# Patient Record
Sex: MALE | Race: WHITE | NOT HISPANIC OR LATINO | Employment: OTHER | URBAN - METROPOLITAN AREA
[De-identification: names, ages, dates, MRNs, and addresses within clinical notes are randomized per-mention and may not be internally consistent; named-entity substitution may affect disease eponyms.]

---

## 2017-07-25 ENCOUNTER — HOSPITAL ENCOUNTER (OUTPATIENT)
Dept: FAMILY MEDICINE CLINIC | Facility: CLINIC | Age: 67
Setting detail: SPECIMEN
Discharge: HOME | End: 2017-07-25
Attending: INTERNAL MEDICINE | Admitting: INTERNAL MEDICINE

## 2017-07-25 LAB
ALBUMIN SERPL-MCNC: 4.3 G/DL (ref 3.5–4.8)
ALBUMIN/GLOB SERPL: 1.5 {RATIO} (ref 1–1.7)
ALP SERPL-CCNC: 57 IU/L (ref 32–91)
ALT SERPL-CCNC: 30 IU/L (ref 17–63)
ANION GAP SERPL CALC-SCNC: 14 MMOL/L (ref 10–20)
AST SERPL-CCNC: 36 IU/L (ref 15–41)
BASOPHILS # BLD AUTO: 0 10*3/UL (ref 0–0.2)
BASOPHILS NFR BLD AUTO: 1 % (ref 0–2)
BILIRUB SERPL-MCNC: 0.8 MG/DL (ref 0.3–1.2)
BUN SERPL-MCNC: 22 MG/DL (ref 8–20)
BUN/CREAT SERPL: 24.4 (ref 6.2–20.3)
CALCIUM SERPL-MCNC: 9.7 MG/DL (ref 8.9–10.3)
CHLORIDE SERPL-SCNC: 101 MMOL/L (ref 101–111)
CHOLEST SERPL-MCNC: 187 MG/DL
CHOLEST/HDLC SERPL: 3.8 {RATIO}
CONV CO2: 26 MMOL/L (ref 22–32)
CONV LDL CHOLESTEROL DIRECT: 126 MG/DL (ref 0–100)
CONV TOTAL PROTEIN: 7.2 G/DL (ref 6.1–7.9)
CREAT UR-MCNC: 0.9 MG/DL (ref 0.7–1.2)
DIFFERENTIAL METHOD BLD: (no result)
EOSINOPHIL # BLD AUTO: 0.3 10*3/UL (ref 0–0.3)
EOSINOPHIL # BLD AUTO: 5 % (ref 0–3)
ERYTHROCYTE [DISTWIDTH] IN BLOOD BY AUTOMATED COUNT: 13.5 % (ref 11.5–14.5)
GLOBULIN UR ELPH-MCNC: 2.9 G/DL (ref 2.5–3.8)
GLUCOSE SERPL-MCNC: 98 MG/DL (ref 65–99)
HCT VFR BLD AUTO: 44.6 % (ref 40–54)
HDLC SERPL-MCNC: 49 MG/DL
HGB BLD-MCNC: 14.9 G/DL (ref 14–18)
LDLC/HDLC SERPL: 2.6 {RATIO}
LIPID INTERPRETATION: ABNORMAL
LYMPHOCYTES # BLD AUTO: 1.8 10*3/UL (ref 0.8–4.8)
LYMPHOCYTES NFR BLD AUTO: 36 % (ref 18–42)
MCH RBC QN AUTO: 31.5 PG (ref 26–32)
MCHC RBC AUTO-ENTMCNC: 33.5 G/DL (ref 32–36)
MCV RBC AUTO: 94.1 FL (ref 80–94)
MONOCYTES # BLD AUTO: 0.5 10*3/UL (ref 0.1–1.3)
MONOCYTES NFR BLD AUTO: 9 % (ref 2–11)
NEUTROPHILS # BLD AUTO: 2.5 10*3/UL (ref 2.3–8.6)
NEUTROPHILS NFR BLD AUTO: 49 % (ref 50–75)
NRBC BLD AUTO-RTO: 0 /100{WBCS}
NRBC/RBC NFR BLD MANUAL: 0 10*3/UL
PLATELET # BLD AUTO: 161 10*3/UL (ref 150–450)
PMV BLD AUTO: 10.1 FL (ref 7.4–10.4)
POTASSIUM SERPL-SCNC: 4 MMOL/L (ref 3.6–5.1)
PSA SERPL-MCNC: 0.97 NG/ML (ref 0–4)
RBC # BLD AUTO: 4.74 10*6/UL (ref 4.6–6)
SODIUM SERPL-SCNC: 137 MMOL/L (ref 136–144)
TRIGL SERPL-MCNC: 55 MG/DL
VLDLC SERPL CALC-MCNC: 12 MG/DL
WBC # BLD AUTO: 5.1 10*3/UL (ref 4.5–11.5)

## 2018-08-09 ENCOUNTER — HOSPITAL ENCOUNTER (OUTPATIENT)
Dept: FAMILY MEDICINE CLINIC | Facility: CLINIC | Age: 68
Setting detail: SPECIMEN
Discharge: HOME | End: 2018-08-09
Attending: INTERNAL MEDICINE | Admitting: INTERNAL MEDICINE

## 2018-08-09 LAB
ALBUMIN SERPL-MCNC: 4.6 G/DL (ref 3.5–4.8)
ALBUMIN/GLOB SERPL: 1.6 {RATIO} (ref 1–1.7)
ALP SERPL-CCNC: 67 IU/L (ref 32–91)
ALT SERPL-CCNC: 28 IU/L (ref 17–63)
ANION GAP SERPL CALC-SCNC: 15.5 MMOL/L (ref 10–20)
AST SERPL-CCNC: 30 IU/L (ref 15–41)
BASOPHILS # BLD AUTO: 0 10*3/UL (ref 0–0.2)
BASOPHILS NFR BLD AUTO: 1 % (ref 0–2)
BILIRUB SERPL-MCNC: 1 MG/DL (ref 0.3–1.2)
BUN SERPL-MCNC: 25 MG/DL (ref 8–20)
BUN/CREAT SERPL: 27.8 (ref 6.2–20.3)
CALCIUM SERPL-MCNC: 10.1 MG/DL (ref 8.9–10.3)
CHLORIDE SERPL-SCNC: 101 MMOL/L (ref 101–111)
CHOLEST SERPL-MCNC: 201 MG/DL
CHOLEST/HDLC SERPL: 4.7 {RATIO}
CONV CO2: 26 MMOL/L (ref 22–32)
CONV LDL CHOLESTEROL DIRECT: 140 MG/DL (ref 0–100)
CONV TOTAL PROTEIN: 7.4 G/DL (ref 6.1–7.9)
CREAT UR-MCNC: 0.9 MG/DL (ref 0.7–1.2)
DIFFERENTIAL METHOD BLD: (no result)
EOSINOPHIL # BLD AUTO: 0.2 10*3/UL (ref 0–0.3)
EOSINOPHIL # BLD AUTO: 2 % (ref 0–3)
ERYTHROCYTE [DISTWIDTH] IN BLOOD BY AUTOMATED COUNT: 13.2 % (ref 11.5–14.5)
GLOBULIN UR ELPH-MCNC: 2.8 G/DL (ref 2.5–3.8)
GLUCOSE SERPL-MCNC: 85 MG/DL (ref 65–99)
HCT VFR BLD AUTO: 44.4 % (ref 40–54)
HDLC SERPL-MCNC: 43 MG/DL
HGB BLD-MCNC: 15.1 G/DL (ref 14–18)
LDLC/HDLC SERPL: 3.3 {RATIO}
LIPID INTERPRETATION: ABNORMAL
LYMPHOCYTES # BLD AUTO: 2 10*3/UL (ref 0.8–4.8)
LYMPHOCYTES NFR BLD AUTO: 29 % (ref 18–42)
MCH RBC QN AUTO: 31.9 PG (ref 26–32)
MCHC RBC AUTO-ENTMCNC: 34.1 G/DL (ref 32–36)
MCV RBC AUTO: 93.5 FL (ref 80–94)
MONOCYTES # BLD AUTO: 0.5 10*3/UL (ref 0.1–1.3)
MONOCYTES NFR BLD AUTO: 8 % (ref 2–11)
NEUTROPHILS # BLD AUTO: 4.2 10*3/UL (ref 2.3–8.6)
NEUTROPHILS NFR BLD AUTO: 60 % (ref 50–75)
NRBC BLD AUTO-RTO: 0 /100{WBCS}
NRBC/RBC NFR BLD MANUAL: 0 10*3/UL
PLATELET # BLD AUTO: 183 10*3/UL (ref 150–450)
PMV BLD AUTO: 10 FL (ref 7.4–10.4)
POTASSIUM SERPL-SCNC: 3.5 MMOL/L (ref 3.6–5.1)
RBC # BLD AUTO: 4.75 10*6/UL (ref 4.6–6)
SODIUM SERPL-SCNC: 139 MMOL/L (ref 136–144)
TRIGL SERPL-MCNC: 110 MG/DL
VLDLC SERPL CALC-MCNC: 17.9 MG/DL
WBC # BLD AUTO: 6.9 10*3/UL (ref 4.5–11.5)

## 2018-08-10 LAB
BILIRUB UR QL STRIP: NEGATIVE MG/DL
CASTS URNS QL MICRO: NORMAL /[LPF]
COLOR UR: YELLOW
CONV BACTERIA IN URINE MICRO: NEGATIVE
CONV CLARITY OF URINE: CLEAR
CONV HYALINE CASTS IN URINE MICRO: 0 /[LPF] (ref 0–5)
CONV PROTEIN IN URINE BY AUTOMATED TEST STRIP: NEGATIVE MG/DL
CONV SMALL ROUND CELLS: NORMAL /[HPF]
CONV UROBILINOGEN IN URINE BY AUTOMATED TEST STRIP: 1 MG/DL
CULTURE INDICATED?: NORMAL
GLUCOSE UR QL: NEGATIVE MG/DL
HGB UR QL STRIP: NEGATIVE
KETONES UR QL STRIP: NEGATIVE MG/DL
LEUKOCYTE ESTERASE UR QL STRIP: NEGATIVE
NITRITE UR QL STRIP: NEGATIVE
PH UR STRIP.AUTO: 6.5 [PH] (ref 4.5–8)
RBC #/AREA URNS HPF: 0 /[HPF] (ref 0–3)
SP GR UR: 1.03 (ref 1–1.03)
SPERM URNS QL MICRO: NORMAL /[HPF]
SQUAMOUS SPT QL MICRO: 0 /[HPF] (ref 0–5)
UNIDENT CRYS URNS QL MICRO: NORMAL /[HPF]
WBC #/AREA URNS HPF: 1 /[HPF] (ref 0–5)
YEAST SPEC QL WET PREP: NORMAL /[HPF]

## 2019-02-13 ENCOUNTER — HOSPITAL ENCOUNTER (OUTPATIENT)
Dept: FAMILY MEDICINE CLINIC | Facility: CLINIC | Age: 69
Setting detail: SPECIMEN
Discharge: HOME | End: 2019-02-13
Attending: INTERNAL MEDICINE | Admitting: INTERNAL MEDICINE

## 2019-02-13 LAB
ALBUMIN SERPL-MCNC: 4.1 G/DL (ref 3.5–4.8)
ALBUMIN/GLOB SERPL: 1.5 {RATIO} (ref 1–1.7)
ALP SERPL-CCNC: 80 IU/L (ref 32–91)
ALT SERPL-CCNC: 31 IU/L (ref 17–63)
ANION GAP SERPL CALC-SCNC: 12.8 MMOL/L (ref 10–20)
AST SERPL-CCNC: 33 IU/L (ref 15–41)
BILIRUB SERPL-MCNC: 0.1 MG/DL (ref 0.3–1.2)
BUN SERPL-MCNC: 22 MG/DL (ref 8–20)
BUN/CREAT SERPL: 24.4 (ref 6.2–20.3)
CALCIUM SERPL-MCNC: 9.3 MG/DL (ref 8.9–10.3)
CHLORIDE SERPL-SCNC: 97 MMOL/L (ref 101–111)
CONV CO2: 26 MMOL/L (ref 22–32)
CONV TOTAL PROTEIN: 6.9 G/DL (ref 6.1–7.9)
CREAT UR-MCNC: 0.9 MG/DL (ref 0.7–1.2)
GLOBULIN UR ELPH-MCNC: 2.8 G/DL (ref 2.5–3.8)
GLUCOSE SERPL-MCNC: 98 MG/DL (ref 65–99)
POTASSIUM SERPL-SCNC: 3.8 MMOL/L (ref 3.6–5.1)
SODIUM SERPL-SCNC: 132 MMOL/L (ref 136–144)

## 2019-05-22 ENCOUNTER — CONVERSION ENCOUNTER (OUTPATIENT)
Dept: FAMILY MEDICINE CLINIC | Facility: CLINIC | Age: 69
End: 2019-05-22

## 2019-05-29 ENCOUNTER — HOSPITAL ENCOUNTER (OUTPATIENT)
Dept: MRI IMAGING | Facility: HOSPITAL | Age: 69
Discharge: HOME | End: 2019-05-29
Attending: INTERNAL MEDICINE | Admitting: INTERNAL MEDICINE

## 2019-06-04 VITALS
WEIGHT: 209.5 LBS | RESPIRATION RATE: 16 BRPM | BODY MASS INDEX: 29.22 KG/M2 | OXYGEN SATURATION: 96 % | DIASTOLIC BLOOD PRESSURE: 80 MMHG | HEART RATE: 58 BPM | SYSTOLIC BLOOD PRESSURE: 152 MMHG

## 2019-06-06 NOTE — PROGRESS NOTES
Vital Signs:    Patient Profile:    69 Years Old Male  Height:     71 inches  Weight:     209.50 pounds  BMI:        29.22     O2 Sat:     96 %  Temp:       98.7 degrees F oral  Pulse rate: 58 / minute  Resp:       16 per minute  BP Sittin / 80  (left arm)        Problems: Active problems were reviewed with the patient during this visit.  Medications: Medications were reviewed with the patient during this visit.  Allergies: Allergies were reviewed with the patient during this visit.  No Known Allergy.        Vitals Entered By: Paty CONRAD  (May 22, 2019 4:11 PM)      Referring Provider:  Mei Kimball MD  Primary Provider:  Mei Kimball MD    CC:  back pain.    History of Present Illness:  Pt here for increased lower back pain- had been doing well with the celebrex- after cutting grass in riding lawn more than 4 hours- thinks the vibrations did it- last night started wiht left leg spasms and excruciating pain- can't stand long enough to make   breakfast,       Past Medical History:     Reviewed history from 2018 and no changes required:        OA        Clavicular fracture        Hypertension                sees Ophthalmology     Past Surgical History:     Reviewed history from 2015 and no changes required:        Arthroscopy, right knee ()        Foot surgery    Family History Summary:      Reviewed history Last on 2019 and no changes required:2019  Mother - Has Family History of Other Medical Problems - mom dementia in 80's - Entered On: 2016  Father - Has Family History of Other Medical Problems -  of dementia - Entered On: 2016  Father - Has Family History of Other Cancer - throat cancer- dad - Entered On: 2016    General Comments - FH:  OA- mom with knee replacement    Social History:     Reviewed history from 2018 and no changes required:        Patient has never smoked.        Passive Smoke: N        Alcohol Use: N        Drug Use:  N        HIV/High Risk: N        Regular Exercise: Y        Hx Domestic Abuse: N        Druze Affecting Care: N        Marital Status:         Children:         Occupation:         Risk Factors:     Smoked Tobacco Use:  Never smoker  Smokeless Tobacco Use:  Never        Physical Exam   Weight:  215  BP:  152/80 mm HG    Medication List:  HYDROCODONE-ACETAMINOPHEN 7.5-325 MG ORAL TABLET (HYDROCODONE-ACETAMINOPHEN) 1 every 8 hours as needed  VITAMIN E TABLET (VITAMIN E TABS) take 1 tablet once daily  FISH OIL CAPSULE (OMEGA-3 FATTY ACIDS CAPS) take 1 capsule once daily  LOSARTAN POTASSIUM-HCTZ 50-12.5 MG ORAL TABLET (LOSARTAN POTASSIUM-HCTZ) 1 po daily  LOSARTAN/HCTZ 50/12.5MG TABLETS (LOSARTAN POTASSIUM-HCTZ) TAKE 1 TABLET BY MOUTH EVERY DAY  LOSARTAN/HCTZ 50/12.5MG TABLETS (LOSARTAN POTASSIUM-HCTZ) TAKE 1 TABLET BY MOUTH EVERY DAY  CELECOXIB 200MG CAPSULES (CELECOXIB) TAKE 1 CAPSULE BY MOUTH TWICE DAILY      Surgical History   Arthroscopy, right knee (2013)  Foot surgery,    Risk Factors  Tobacco Use: Never smoker  Passive smoke exposure: no  Exercise: yes  Type of Exercise: golf  Illicit Drug use: no  Taking Calcium Supplement: no      Orientation     Language   Alert 1  Total MMSE Score: 30    Physical Examination   General Appearance   In no acute distress.  Alert & oriented.  Behavior and affect appropriate to situation  Skin   No suspicious lesions, moles or rashes . Turgor good  HEENT   PERRLA, EOMI, TM's normal.  Pharynx clear  Neck   Supple.  No adenopathy  Cardiovascular   Regular rate and rhythm  Lungs   Clear to auscultation  Abdomen   Soft, non-tender.  Bowel sounds present.  No hepatosplenomegaly  Back    left sciaitica very tender- midline diffuse tenderness  Musculoskeletal   left trocanteric very tender  Neurologic    paresthesia  Psych   Alert and cooperative; normal mood and affect; normal attention span and concentration        Impression & Recommendations:    Problem # 1:  SPINAL  STENOSIS, LUMBAR (ICD-724.02) (VVK28-M96.06)    Problem # 2:  BURSITIS (ICD-727.3) (VNQ80-I76.9)    Orders:  Depo-Medrol 80 mg ()  Injection into Large Joint (CPT-96809)      Medications Added to Medication List This Visit:  1)  Hydrocodone-acetaminophen 7.5-325 Mg Oral Tablet (Hydrocodone-acetaminophen) .... 1 every 8 hours as needed      Patient Instructions:  1)  During this office visit we discussed the pertinent aspects of the visit and the treatment recommendations.  Patient was given the opportunity to ask questions and discuss other concerns.  2)   tolerated injection well- will get MRI of lumbar as soon as can walk better                Medication Administration    Orders Added:  1)  Ofc Vst, Est Level III [75954]  2)  Depo-Medrol 80 mg []  3)  Injection into Large Joint [CPT-08246]                Procedure Note    Injections:  Indication: acute pain    Procedure # 1: joint injection     Region: lateral     Location: left hip     Technique: 20 g 1-1/2' needle     Medication: 80 mg depomedrol     Anesthesia: 1.0 ml 0.5% marcaine     Comment: [place] trocanteric injection without problems          Electronically signed by Mei Kimball MD on 05/23/2019 at 8:05 AM  ________________________________________________________________________       Disclaimer: Converted Note message may not contain all data elements that existed in the legacy source system. Please see [employer] [facility] for the original note details.

## 2019-06-12 ENCOUNTER — TRANSCRIBE ORDERS (OUTPATIENT)
Dept: ORTHOPEDIC SURGERY | Facility: CLINIC | Age: 69
End: 2019-06-12

## 2019-06-12 ENCOUNTER — HOSPITAL ENCOUNTER (OUTPATIENT)
Dept: ORTHOPEDIC SURGERY | Facility: CLINIC | Age: 69
Discharge: HOME | End: 2019-06-12
Attending: ORTHOPAEDIC SURGERY | Admitting: ORTHOPAEDIC SURGERY

## 2019-06-12 ENCOUNTER — CONVERSION ENCOUNTER (OUTPATIENT)
Dept: ORTHOPEDIC SURGERY | Facility: CLINIC | Age: 69
End: 2019-06-12

## 2019-06-12 VITALS
SYSTOLIC BLOOD PRESSURE: 130 MMHG | DIASTOLIC BLOOD PRESSURE: 77 MMHG | HEIGHT: 71 IN | HEART RATE: 62 BPM | BODY MASS INDEX: 29.68 KG/M2 | WEIGHT: 212 LBS

## 2019-06-12 DIAGNOSIS — M54.5 LOW BACK PAIN, UNSPECIFIED BACK PAIN LATERALITY, UNSPECIFIED CHRONICITY, WITH SCIATICA PRESENCE UNSPECIFIED: Primary | ICD-10-CM

## 2019-06-13 NOTE — PROGRESS NOTES
Visit Type:  Consult  Referring Provider:  Mei Kimball MD  Primary Care Provider:  Rehana    Chief Complaint:  back, hip and left leg pain.    History of Present Illness:         This is a right-handed patient who presents with back, hip, left leg pain.  The symptoms began 2 months ago.  The intensity is described as 8.           He complains of low back pain, hip pain, numbness and weakness.  Pain is described as sharp, burning and radiating.  The pain radiates to both hips, left leg/foot and right leg/foot.  Symptoms are made worse with extension, activity and standing.  The   pain is improved with flexion, sitting and lying down.  Critical exclusionary symptoms include age >50 yrs with new back pain.  Evaluation and treatment to date includes PT, chiropractor and pain management clinic.        Past Medical History:     Reviewed history from 2018 and no changes required:        OA        Clavicular fracture        Hypertension                sees Ophthalmology     Past Surgical History:     Reviewed history from 2015 and no changes required:        Arthroscopy, right knee ( and )        Foot surgery, tumor removed         Resection distal clavical     Family History Summary:      Reviewed history Last on 2019 and no changes required:2019  Mother - Has Family History of Other Medical Problems - mom dementia in 80's - Entered On: 2016  Father - Has Family History of Other Medical Problems -  of dementia - Entered On: 2016  Father - Has Family History of Other Cancer - throat cancer- dad - Entered On: 2016    General Comments - FH:  OA- mom with knee replacement    Social History:     Reviewed history from 2018 and no changes required:        Patient has never smoked.        Passive Smoke: N        Alcohol Use: N        Drug Use: N        HIV/High Risk: N        Regular Exercise: Y        Hx Domestic Abuse: N        Hinduism Affecting Care: N         Marital Status:         Children:         Occupation:         Risk Factors:     Smoked Tobacco Use:  Never smoker  Smokeless Tobacco Use:  Never  Passive smoke exposure:  no  Drug use:  no  HIV high-risk behavior:  no  Alcohol use:  yes     Type:  beer     Drinks per day:  1  Exercise:  yes     Type of Exercise:  golf  Seatbelt use:  100 %  Sun Exposure:  occasionally      Vital Signs:    Patient Profile:    69 Years Old Male  Height:     71 inches  Weight:     212 pounds  BMI:        29.56     Pulse rate: 62 / minute  BP Sittin / 77  (left arm)    Cuff size:  large       Type:        normal    Problems: Active problems were reviewed with the patient during this visit.  Medications: Medications were reviewed with the patient during this visit.  Allergies: Allergies were reviewed with the patient during this visit.  No Known Allergy.        Vitals Entered By: Yanci Davila CMA (2019 2:03 PM)    Active Medications (reviewed today):  NORCO  MG ORAL TABLET (HYDROCODONE-ACETAMINOPHEN) take 1 every 8 hours as needed for pain  VITAMIN E TABLET (VITAMIN E TABS) take 1 tablet once daily  FISH OIL CAPSULE (OMEGA-3 FATTY ACIDS CAPS) take 1 capsule once daily  LOSARTAN POTASSIUM-HCTZ 50-12.5 MG ORAL TABLET (LOSARTAN POTASSIUM-HCTZ) 1 po daily  LOSARTAN/HCTZ 50/12.5MG TABLETS (LOSARTAN POTASSIUM-HCTZ) TAKE 1 TABLET BY MOUTH EVERY DAY  LOSARTAN/HCTZ 50/12.5MG TABLETS (LOSARTAN POTASSIUM-HCTZ) TAKE 1 TABLET BY MOUTH EVERY DAY  CELECOXIB 200MG CAPSULES (CELECOXIB) TAKE 1 CAPSULE BY MOUTH TWICE DAILY    Current Allergies (reviewed today):  No known allergies    Review of Systems   Neurologic: Denies Seizure.   General: Denies anorexia.   Eyes: Denies irritation.   Ears/Nose/Throat: Denies ear discharge.   Cardiovascular: Denies PND.   Respiratory: Denies chronic cough.   Gastrointestinal: Denies odynophagia.   Musculoskeletal: Complains of back pain, muscle cramps, muscle weakness, stiffness.   Skin:  Denies rash.   Psychiatric: Denies hallucinations.   Endocrine: Denies polydipsia.   ROS Comments: Axial lower back pain bilateral buttock and lower extremity pain difficulty walking hip and knee osteoarthritis      General   General appearance: Well-developed, well-nourished, in no acute distress; alert and oriented x 3.    Build and nutrition: normal  Posture: normal    Head   Shape: normal  Face: normal  Scalp: normal    Cardiovascular   Auscultation: S1, S2, no murmur, rub, or gallop  Carotid arteries: pulses 2+, symmetric, no bruits  Peripheral vascular: no cyanosis, clubbing, edema, or varicosities    Lymphatic   Lymph nodes: normal      Lumbosacral Exam:    Inspection-deformity:       Yes      de Yolanda scoliosis  Palpation-spinal tenderness:    Yes  Range of Motion     Forward Flexion: 25 degrees     Hyperextension:  10 degrees     Schober's:           >6 cm     Right Lateral Bend:  10 degrees     Left Lateral Bend:   10 degrees  Motor Exam/Strength:     Right:        Ankle Dorsiflexion (L5,L4):       normal        Great Toe Dorsiflexion (L5,L4):   normal        Heel Walk (L5,some L4):       normal        Single Squat & Rise Quads (L4):   normal        Toe Walk-calf (S1):           abnormal     Left:        Ankle Dorsiflexion (L5,L4):       normal        Great Toe Dorsiflexion (L5,L4):   normal        Heel Walk (L5,some L4):       normal        Single Squat & Rise-Quads (L4):   normal        Toe Walk-calf (S1):           normal      right ankle plantar flexor is 4/5, atrophy of right calf  Sensory Exam/Pinprick:     Right:        Medial Foot (L4): normal        Dorsal Foot (L5): normal        Lateral Foot (S1):    normal     Left:        Medial Foot (L4): normal        Dorsal Foot (L5): normal        Lateral Foot (S1):    normal  Reflexes:     Right:        Knee Jerk (L4):   normal        Ankle (S1):       decreased     Left:        Knee Jerk (L4):   normal        Ankle (S1):       normal  Squatting:   normal  Straight Leg Raise (SLR):     Right:  positive     Left:  positive  Sitting Knee Extension (SKE):     Right:  positive     Left:  positive  Reverse Straight Leg Raise:     Right:  negative     Left:  negative  Toe-Heel Walking:     Right:  negative     Left:  negative  Spurling Maneuver:     Right:  negative     Left:  negative  James's Maneuver:     Right:  negative     Left:  negative  Castro's Sign:     Right:  negative     Left:  negative  Fabere Test:     Right:  negative     Left:  negative  Heel Percussion:     Right:  negative     Left:  negative    Cervical Exam:    Inspection-deformity:       No  Palpation-spinal tenderness:    No  Range of Motion     Forward Flexion: 60 degrees     Hyperextension:  75 degrees     Right Lat. Flexion:  45 degrees     Right Lat. Rotation: 80 degrees     Left Lat. Flexion:   45 degrees     Left Lat. Rotation:  80 degrees  Motor Exam/Strength:     Right:        Shoulder Abduct. (C5,C6): normal        Biceps (C5,C6):       normal        Triceps (C6,C7):      normal        Handgrip (C7,C8,T1):  normal        Finger Spread (C8,T1):    normal     Left:        Shoulder Abduct. (C5,C6): normal        Biceps (C5,C6):       normal        Triceps (C6,C7):      normal        Handgrip (C7,C8,T1):  normal        Finger Spread (C8,T1):    normal  Sensory Exam/Pinprick:     Right:        Posterior Scalp (C2):     normal        Posterior Neck (C3):      normal        Upper Shoulder (C4):      normal        Upper Lateral Arm (C5):   normal        Thumb (C6):       normal        3rd Finger (C7):      normal        5th Finger (C8):      normal        Medial Forearm (T1):  normal     Left:        Posterior Scalp (C2):     normal        Posterior Neck (C3):      normal        Upper Shoulder (C4):      normal        Upper Lateral Arm (C5):   normal        Thumb (C6):       normal        3rd Finger (C7):      normal        5th Finger (C8):      normal        Medial Forearm (T1):   normal  Reflexes:     Right:        Biceps (C5,C6):       normal        Triceps (C6,C7):      normal        Brachioradialis (C5,C6):  normal     Left:        Biceps (C5,C6):       normal        Triceps (C6,C7):      normal        Brachioradialis (C5,C6):  normal    Thoracic Exam:    Inspection-deformity:       No  Palpation-spinal tenderness:    No      Hip Exam    General:     Well-developed, well-nourished, in no acute distress; alert and oriented x 3.      Gait:     Normal heel-toe gait pattern bilaterally.      Skin:     Intact with no erythema; no scarring.      Inspection:     plantigrade foot with normal alignment of leg, ankle, hindfoot, and foot.     Palpation:     non-tender to palpation over distal leg, medial and lateral ankle, distal achilles tendon, medial and lateral hindfoot, posterior heel, plantar heel, midfoot and forefoot bilaterally.     Vascular:     dorsalis pedis and posterior tibial pulses 2+ and symmetric, capillary refill < 2 seconds, normal hair pattern, no evidence of ischemia.     Sensory:     sensation intact to light touch bilaterally in lower extremities.      Motor:     Motor strength 5/5 bilaterally for quadriceps, hamstrings, ankle dorsiflexion, ankle plantar flexion, hindfoot eversion, hindfoot inversion, EHL (extensor hallicus longus), and FHL (flexor hallicus longus).      Reflexes:     Normal and symmetric patellar and Achilles reflexes bilaterally.      Hip Exam:     Right:     Inspection:  Normal     Palpation:  Normal     Stability:  stable     Tenderness:  no     Swelling:  no     Erythema:  no     Range of Motion:        Flexion-Active: 60        Extension-Active: 45        Internal Rotation-Active: 45        External Rotation-Active: 75        Flexion-Passive: 60        Extension-Passive: 45        Internal Rotation-Passive: 45        External Rotation: 75     Left:     Inspection:  Normal     Palpation:  Normal     Stability:  stable     Tenderness:  no     Swelling:   no     Erythema:  no     Range of Motion:        Flexion-Active: 60        Extension-Active: 45        Internal Rotation-Active: 15        External Rotation-Active: 75        Flexion-Passive: 60        Extension-Passive: 45        Internal Rotation-Passive: 20        External Rotation: 75    Tinel's of Foot:     Negative Tinel’s over posterior tibial, superficial peroneal, and sural nerves.          Neurologic   DTR Right: normal except achilles 1/2  DTR Left: 2+ symmetric  Cerebellar function: normal  Meningeal signs: none  L'Hermitte's negative  Spurling's: negative  Gaenslen's: negative    Sensory   Light touch: normal  Pain: normal  Vibration: normal  Stereognosis: normal  Number identification: normal  Proprioception: normal  Paresthesias: none    Cranial Nerves  5th (Trigeminal): normal corneal reflex  7th (Facial): no weakness  8th (Auditory): normal hearing with whispered voice or finger rub  9th (Glossophar): gag reflex normal  11th (Spinal access): no atrophy, normal strength bilaterally  12th (Hypoglossal): tongue midline on protrusion, no fasciculations    Mental Status Exam   Affect: normal  Orientation: oriented to time, place, and person  Attention span/Concentration/Cognitive function: all normal  Memory: normal  Speech/language: normal  Fund of knowledge: able to name months, seasons, current president  Thought content: normal      LS Spine/AP/Lateral    Procedure date:  06/12/2019    Findings:        x-ray of lumbar spine is demonstrating care de Yolanda scoliosis with convexity to the right side Chun measurement of T12-L4 about 25°, lumbar lordosis of 4° pelvic tilt of 20° and pelvic incidence of 60°        Impression & Recommendations:    Problem # 1:  Degenerative scoliosis (ICD-737.39) (CZV77-D37.80)  Assessment: Deteriorated   impression: this patient is a very pleasant  69 years old gentleman who referred to me by Dr. Kimball for evaluation of his axial back pain, intractable pain in his buttock  and lower extremities  difficulty walking the patient has had episode of back pain   for many years but recently his pain aggravated the patient has weakness of right calf muscles and difficulty with his right ankle plantar flexor, his ankle plantar flexor are 4/5 and he is not able to do toe walking in the right leg.  The patient x-rays   demonstrating care de Yolanda scoliosis with mild that translation of L3 over L4 scoliosis with convexity to the left side Chun measurement of T12-L4 about 25°, L1-L5 lumbar lordosis of 40°, pelvic tilt of 20° and pelvic incidence of 60°.  MRI is   demonstrating de Yolanda scoliosis spinal and foraminal stenosis from L2-S1 profoundly at the level of L3-L4.  The patient tried physical therapy pain management without any benefit the patient would like to proceed with surgical intervention and I had the    opportunity to discuss with team and his wife the option of surgical decompression and implantation of Co flex versus surgical Depo compression and a spinal fusion surgery which is going to be T10-S2 fusion, the patient does not like to proceed with fusion   surgery am going to obtain CT scan of lumbar spine to look at his bone density look at his bony structure to be able to make a decision for this patient.    Medications Added to Medication List This Visit:  1)  Aspir-low 81 Mg Oral Tablet Delayed Release (Aspirin)  2)  Daily Multivitamin Oral Capsule (Multiple vitamins-minerals)    Other Orders:  CT LUMBAR SPINE WO (STANDARD) (CPT-10093)  VITAMIN D TOTAL (VITD)  LS Spine 2 view A,P Lat-TC only (80681-ZM)  Ofc Vst, New Level IV (99746)      Patient Instructions:  1)  Please schedule a follow-up appointment after CT.  2)  Please schedule a follow-up appointment in 4 weeks.  3)  Discussed importance of regular exercise and recommended starting or continuing a regular exercise program for good health.    ]      Electronically signed by Isabel Mckinney MD on 06/12/2019 at 5:21  PM  ________________________________________________________________________       Disclaimer: Converted Note message may not contain all data elements that existed in the legacy source system. Please see [facility] for the original note details.

## 2019-06-24 ENCOUNTER — LAB (OUTPATIENT)
Dept: LAB | Facility: HOSPITAL | Age: 69
End: 2019-06-24

## 2019-06-24 ENCOUNTER — TRANSCRIBE ORDERS (OUTPATIENT)
Dept: ADMINISTRATIVE | Facility: HOSPITAL | Age: 69
End: 2019-06-24

## 2019-06-24 ENCOUNTER — HOSPITAL ENCOUNTER (OUTPATIENT)
Dept: CT IMAGING | Facility: HOSPITAL | Age: 69
Discharge: HOME | End: 2019-06-24
Admitting: ORTHOPAEDIC SURGERY

## 2019-06-24 DIAGNOSIS — M54.5 LOW BACK PAIN, UNSPECIFIED BACK PAIN LATERALITY, UNSPECIFIED CHRONICITY, WITH SCIATICA PRESENCE UNSPECIFIED: ICD-10-CM

## 2019-06-24 DIAGNOSIS — E55.9 VITAMIN D DEFICIENCY, UNSPECIFIED: ICD-10-CM

## 2019-06-24 DIAGNOSIS — E55.9 VITAMIN D DEFICIENCY, UNSPECIFIED: Primary | ICD-10-CM

## 2019-06-24 PROCEDURE — 82306 VITAMIN D 25 HYDROXY: CPT

## 2019-06-24 PROCEDURE — 36415 COLL VENOUS BLD VENIPUNCTURE: CPT

## 2019-06-24 PROCEDURE — 72131 CT LUMBAR SPINE W/O DYE: CPT

## 2019-06-25 LAB — 25(OH)D3 SERPL-MCNC: 34.5 NG/ML (ref 30–100)

## 2019-07-10 ENCOUNTER — TELEPHONE (OUTPATIENT)
Dept: FAMILY MEDICINE CLINIC | Facility: CLINIC | Age: 69
End: 2019-07-10

## 2019-07-10 ENCOUNTER — OFFICE VISIT (OUTPATIENT)
Dept: ORTHOPEDIC SURGERY | Facility: CLINIC | Age: 69
End: 2019-07-10

## 2019-07-10 VITALS
SYSTOLIC BLOOD PRESSURE: 151 MMHG | HEART RATE: 58 BPM | DIASTOLIC BLOOD PRESSURE: 73 MMHG | HEIGHT: 71 IN | BODY MASS INDEX: 30.49 KG/M2 | WEIGHT: 217.8 LBS

## 2019-07-10 DIAGNOSIS — M48.062 SPINAL STENOSIS OF LUMBAR REGION WITH NEUROGENIC CLAUDICATION: Primary | ICD-10-CM

## 2019-07-10 PROCEDURE — 99213 OFFICE O/P EST LOW 20 MIN: CPT | Performed by: ORTHOPAEDIC SURGERY

## 2019-07-10 RX ORDER — ASPIRIN 81 MG/1
81 TABLET ORAL DAILY
COMMUNITY

## 2019-07-10 RX ORDER — CELECOXIB 200 MG/1
200 CAPSULE ORAL 2 TIMES DAILY
COMMUNITY
Start: 2019-05-10 | End: 2019-11-26 | Stop reason: SDUPTHER

## 2019-07-10 RX ORDER — MULTIPLE VITAMINS W/ MINERALS TAB 9MG-400MCG
1 TAB ORAL DAILY
COMMUNITY

## 2019-07-10 RX ORDER — LOSARTAN POTASSIUM AND HYDROCHLOROTHIAZIDE 12.5; 5 MG/1; MG/1
1 TABLET ORAL EVERY MORNING
COMMUNITY
Start: 2019-05-10 | End: 2019-11-26

## 2019-07-10 RX ORDER — ASCORBATE CALCIUM 500 MG
TABLET ORAL
COMMUNITY
Start: 2018-08-09 | End: 2019-07-17

## 2019-07-10 NOTE — PROGRESS NOTES
Visit Type: Follow Up  Referring Provider: No ref. provider found  Primary Care Provider: Mei Kimball MD    Patient Name: Daren Zambrano  Patient Age: 69 y.o.  Chief Complaint: had concerns including Follow-up and Pain of the Lumbar Spine (F/u CT Lspine).    Subjective   History of Present Illness: Axial lower back pain bilateral buttock and lower extremity pain and difficulty walking left worse than right, the patient was diagnosed with degenerative scoliosis profound spinal and foraminal stenosis the patient tried physical therapy and pain management without significant benefit, the patient is here today for evaluation of his lumbar spine CT scan.      Review of Systems   Constitutional: Positive for activity change.   Musculoskeletal: Positive for back pain and myalgias.   Neurological: Positive for numbness.       The following portions of the patient's history were reviewed and updated as appropriate: allergies, current medications, past family history, past medical history, past social history, past surgical history and problem list.    Past Medical History:   Diagnosis Date   • Clavicle fracture    • Hypertension    • Osteoarthritis        Past Surgical History:   Procedure Laterality Date   • FOOT SURGERY      TUMOR REMOVED   • KNEE ARTHROSCOPY Right      AND    • RESECTION DISTAL CLAVICLE         Vitals:    07/10/19 0825   BP: 151/73   Pulse: 58       There is no problem list on file for this patient.      Family History Summary:  family history includes Cancer in his father; Dementia in his mother; Other in his mother.    Social History     Socioeconomic History   • Marital status:      Spouse name: Not on file   • Number of children: Not on file   • Years of education: Not on file   • Highest education level: Not on file   Tobacco Use   • Smoking status: Former Smoker     Types: Cigarettes     Last attempt to quit: 7/10/1977     Years since quittin.0   Substance and  Sexual Activity   • Alcohol use: No     Frequency: Never   • Drug use: No   • Sexual activity: Defer         Current Outpatient Medications:   •  aspirin 81 MG EC tablet, Take 81 mg by mouth Daily., Disp: , Rfl:   •  celecoxib (CeleBREX) 200 MG capsule, , Disp: , Rfl:   •  losartan-hydrochlorothiazide (HYZAAR) 50-12.5 MG per tablet, , Disp: , Rfl:   •  Multiple Vitamins-Minerals (MULTIVITAMIN WITH MINERALS) tablet tablet, Take 1 tablet by mouth Daily., Disp: , Rfl:   •  Omega-3 Fatty Acids (FISH OIL PO), FISH OIL CAPS, Disp: , Rfl:   •  Vitamin E 100 units tablet, VITAMIN E TABS, Disp: , Rfl:     No Known Allergies        Objective   Physical Exam  Back Exam     Tenderness   The patient is experiencing tenderness in the lumbar.    Range of Motion   Extension:  40 abnormal   Flexion:  40 abnormal   Lateral bend right:  20 abnormal   Lateral bend left:  20 abnormal   Rotation right: 10   Rotation left: 10     Muscle Strength   The patient has normal back strength.    Tests   Straight leg raise right: negative  Straight leg raise left: negative    Reflexes   Patellar: normal  Achilles: normal  Biceps: normal  Babinski's sign: normal     Other   Toe walk: abnormal  Heel walk: abnormal  Sensation: decreased  Gait: normal               Impression and Plan: This patient is here today for follow-up and evaluation of his lumbar spine CT scan the patient has profound stenosis of L3-S1 facet arthropathy and degenerative scoliosis, I have measured his Hounsfield unit which is at the range of 144 lower lumbar spine segments.  I had a long discussion with this patient regarding simple decompression versus decompression and fusion surgery the patient does not like to proceed with reconstruction and fusion surgery and I am planning to proceed with partial laminectomy posterior decompression of L3-L5 with implantation of Coflex, partial laminectomy bilateral foraminotomy and left L5-S1 microdiscectomy.  The risk and benefits of  procedure as like as risk of bleeding, infection, DVT risk of dural tear and CSF leak risk of failure of implantation risk of future reconstructive surgery explained to him and his wife in detail.    No primary diagnosis found.     No orders of the defined types were placed in this encounter.              Isabel Mckinney MD  07/10/19  9:10 AM

## 2019-07-10 NOTE — TELEPHONE ENCOUNTER
Patient left  stating that he is scheduled for surgery on 7/29 with Dr. Mckinney. Patient states that they are supposed to be faxing letter for you to sign for clearance. Patient is asking if he needs to be seen or can papers just be signed.

## 2019-07-11 ENCOUNTER — PREP FOR SURGERY (OUTPATIENT)
Dept: OTHER | Facility: HOSPITAL | Age: 69
End: 2019-07-11

## 2019-07-11 DIAGNOSIS — M48.00 SPINAL STENOSIS: Primary | ICD-10-CM

## 2019-07-11 RX ORDER — SODIUM CHLORIDE 0.9 % (FLUSH) 0.9 %
3-10 SYRINGE (ML) INJECTION AS NEEDED
Status: CANCELLED | OUTPATIENT
Start: 2019-07-11

## 2019-07-11 RX ORDER — CLINDAMYCIN PHOSPHATE 900 MG/50ML
900 INJECTION, SOLUTION INTRAVENOUS ONCE
Status: CANCELLED | OUTPATIENT
Start: 2019-07-11 | End: 2019-07-11

## 2019-07-11 RX ORDER — SODIUM CHLORIDE 0.9 % (FLUSH) 0.9 %
3 SYRINGE (ML) INJECTION EVERY 12 HOURS SCHEDULED
Status: CANCELLED | OUTPATIENT
Start: 2019-07-11

## 2019-07-12 PROBLEM — M48.00 SPINAL STENOSIS: Status: ACTIVE | Noted: 2019-07-12

## 2019-07-17 ENCOUNTER — HOSPITAL ENCOUNTER (OUTPATIENT)
Dept: GENERAL RADIOLOGY | Facility: HOSPITAL | Age: 69
Discharge: HOME | End: 2019-07-17

## 2019-07-17 ENCOUNTER — APPOINTMENT (OUTPATIENT)
Dept: PREADMISSION TESTING | Facility: HOSPITAL | Age: 69
End: 2019-07-17

## 2019-07-17 VITALS
SYSTOLIC BLOOD PRESSURE: 135 MMHG | BODY MASS INDEX: 30.17 KG/M2 | WEIGHT: 215.5 LBS | OXYGEN SATURATION: 95 % | HEART RATE: 53 BPM | HEIGHT: 71 IN | DIASTOLIC BLOOD PRESSURE: 74 MMHG

## 2019-07-17 LAB
ABO GROUP BLD: NORMAL
ANION GAP SERPL CALCULATED.3IONS-SCNC: 14.3 MMOL/L (ref 5–15)
APTT PPP: 22.8 SECONDS (ref 24–31)
BASOPHILS # BLD AUTO: 0.1 10*3/MM3 (ref 0–0.2)
BASOPHILS NFR BLD AUTO: 1 % (ref 0–1.5)
BLD GP AB SCN SERPL QL: NEGATIVE
BUN BLD-MCNC: 20 MG/DL (ref 8–20)
BUN/CREAT SERPL: 20 (ref 6.2–20.3)
CALCIUM SPEC-SCNC: 9.6 MG/DL (ref 8.9–10.3)
CHLORIDE SERPL-SCNC: 100 MMOL/L (ref 101–111)
CO2 SERPL-SCNC: 27 MMOL/L (ref 22–32)
CREAT BLD-MCNC: 1 MG/DL (ref 0.7–1.2)
DEPRECATED RDW RBC AUTO: 47.7 FL (ref 37–54)
EOSINOPHIL # BLD AUTO: 0.2 10*3/MM3 (ref 0–0.4)
EOSINOPHIL NFR BLD AUTO: 3.1 % (ref 0.3–6.2)
ERYTHROCYTE [DISTWIDTH] IN BLOOD BY AUTOMATED COUNT: 14.2 % (ref 12.3–15.4)
GFR SERPL CREATININE-BSD FRML MDRD: 74 ML/MIN/1.73
GLUCOSE BLD-MCNC: 85 MG/DL (ref 65–99)
HCT VFR BLD AUTO: 45.2 % (ref 37.5–51)
HGB BLD-MCNC: 15.3 G/DL (ref 13–17.7)
INR PPP: 1 (ref 0.9–1.1)
LYMPHOCYTES # BLD AUTO: 1.6 10*3/MM3 (ref 0.7–3.1)
LYMPHOCYTES NFR BLD AUTO: 30.2 % (ref 19.6–45.3)
MCH RBC QN AUTO: 32.2 PG (ref 26.6–33)
MCHC RBC AUTO-ENTMCNC: 33.9 G/DL (ref 31.5–35.7)
MCV RBC AUTO: 95.1 FL (ref 79–97)
MONOCYTES # BLD AUTO: 0.4 10*3/MM3 (ref 0.1–0.9)
MONOCYTES NFR BLD AUTO: 8 % (ref 5–12)
NEUTROPHILS # BLD AUTO: 3 10*3/MM3 (ref 1.7–7)
NEUTROPHILS NFR BLD AUTO: 57.7 % (ref 42.7–76)
NRBC BLD AUTO-RTO: 0.1 /100 WBC (ref 0–0.2)
PLATELET # BLD AUTO: 156 10*3/MM3 (ref 140–450)
PMV BLD AUTO: 10.3 FL (ref 6–12)
POTASSIUM BLD-SCNC: 4.3 MMOL/L (ref 3.6–5.1)
PROTHROMBIN TIME: 10.3 SECONDS (ref 9.6–11.7)
RBC # BLD AUTO: 4.75 10*6/MM3 (ref 4.14–5.8)
RH BLD: POSITIVE
SODIUM BLD-SCNC: 137 MMOL/L (ref 136–144)
T&S EXPIRATION DATE: NORMAL
WBC NRBC COR # BLD: 5.2 10*3/MM3 (ref 3.4–10.8)

## 2019-07-17 PROCEDURE — 85610 PROTHROMBIN TIME: CPT | Performed by: PHYSICIAN ASSISTANT

## 2019-07-17 PROCEDURE — 93005 ELECTROCARDIOGRAM TRACING: CPT

## 2019-07-17 PROCEDURE — 86901 BLOOD TYPING SEROLOGIC RH(D): CPT | Performed by: PHYSICIAN ASSISTANT

## 2019-07-17 PROCEDURE — 86900 BLOOD TYPING SEROLOGIC ABO: CPT

## 2019-07-17 PROCEDURE — 71046 X-RAY EXAM CHEST 2 VIEWS: CPT

## 2019-07-17 PROCEDURE — 85730 THROMBOPLASTIN TIME PARTIAL: CPT | Performed by: PHYSICIAN ASSISTANT

## 2019-07-17 PROCEDURE — 86850 RBC ANTIBODY SCREEN: CPT | Performed by: PHYSICIAN ASSISTANT

## 2019-07-17 PROCEDURE — 86901 BLOOD TYPING SEROLOGIC RH(D): CPT

## 2019-07-17 PROCEDURE — 93010 ELECTROCARDIOGRAM REPORT: CPT | Performed by: INTERNAL MEDICINE

## 2019-07-17 PROCEDURE — 87081 CULTURE SCREEN ONLY: CPT | Performed by: PHYSICIAN ASSISTANT

## 2019-07-17 PROCEDURE — 86900 BLOOD TYPING SEROLOGIC ABO: CPT | Performed by: PHYSICIAN ASSISTANT

## 2019-07-17 PROCEDURE — 85025 COMPLETE CBC W/AUTO DIFF WBC: CPT | Performed by: PHYSICIAN ASSISTANT

## 2019-07-17 PROCEDURE — 36415 COLL VENOUS BLD VENIPUNCTURE: CPT

## 2019-07-17 PROCEDURE — 80048 BASIC METABOLIC PNL TOTAL CA: CPT | Performed by: PHYSICIAN ASSISTANT

## 2019-07-18 LAB — MRSA SPEC QL CULT: NORMAL

## 2019-07-29 ENCOUNTER — ANESTHESIA EVENT (OUTPATIENT)
Dept: PERIOP | Facility: HOSPITAL | Age: 69
End: 2019-07-29

## 2019-07-29 ENCOUNTER — HOSPITAL ENCOUNTER (OUTPATIENT)
Facility: HOSPITAL | Age: 69
Discharge: HOME | End: 2019-07-30
Attending: ORTHOPAEDIC SURGERY | Admitting: ORTHOPAEDIC SURGERY

## 2019-07-29 ENCOUNTER — APPOINTMENT (OUTPATIENT)
Dept: GENERAL RADIOLOGY | Facility: HOSPITAL | Age: 69
End: 2019-07-29

## 2019-07-29 ENCOUNTER — ANESTHESIA (OUTPATIENT)
Dept: PERIOP | Facility: HOSPITAL | Age: 69
End: 2019-07-29

## 2019-07-29 DIAGNOSIS — M48.00 SPINAL STENOSIS: ICD-10-CM

## 2019-07-29 PROBLEM — M54.30 SCIATICA: Status: ACTIVE | Noted: 2019-05-02

## 2019-07-29 PROCEDURE — 22867 INSJ STABLJ DEV W/DCMPRN: CPT | Performed by: PHYSICIAN ASSISTANT

## 2019-07-29 PROCEDURE — A9270 NON-COVERED ITEM OR SERVICE: HCPCS | Performed by: PHYSICIAN ASSISTANT

## 2019-07-29 PROCEDURE — 76000 FLUOROSCOPY <1 HR PHYS/QHP: CPT

## 2019-07-29 PROCEDURE — 25010000002 HYDROMORPHONE PER 4 MG: Performed by: ANESTHESIOLOGIST ASSISTANT

## 2019-07-29 PROCEDURE — 22867 INSJ STABLJ DEV W/DCMPRN: CPT | Performed by: ORTHOPAEDIC SURGERY

## 2019-07-29 PROCEDURE — 25010000002 DEXAMETHASONE PER 1 MG: Performed by: ANESTHESIOLOGIST ASSISTANT

## 2019-07-29 PROCEDURE — 63710000001 POLYETHYLENE GLYCOL PACK: Performed by: PHYSICIAN ASSISTANT

## 2019-07-29 PROCEDURE — 22868 INSJ STABLJ DEV W/DCMPRN: CPT | Performed by: PHYSICIAN ASSISTANT

## 2019-07-29 PROCEDURE — 72100 X-RAY EXAM L-S SPINE 2/3 VWS: CPT

## 2019-07-29 PROCEDURE — 63710000001 CELECOXIB 200 MG CAPSULE: Performed by: PHYSICIAN ASSISTANT

## 2019-07-29 PROCEDURE — 25010000002 FENTANYL CITRATE (PF) 100 MCG/2ML SOLUTION: Performed by: ANESTHESIOLOGIST ASSISTANT

## 2019-07-29 PROCEDURE — 25010000003 BUPIVACAINE LIPOSOME 1.3 % SUSPENSION: Performed by: ORTHOPAEDIC SURGERY

## 2019-07-29 PROCEDURE — 63047 LAM FACETEC & FORAMOT LUMBAR: CPT | Performed by: ORTHOPAEDIC SURGERY

## 2019-07-29 PROCEDURE — 63047 LAM FACETEC & FORAMOT LUMBAR: CPT | Performed by: PHYSICIAN ASSISTANT

## 2019-07-29 PROCEDURE — 25010000002 CEFAZOLIN PER 500 MG: Performed by: PHYSICIAN ASSISTANT

## 2019-07-29 PROCEDURE — 22868 INSJ STABLJ DEV W/DCMPRN: CPT | Performed by: ORTHOPAEDIC SURGERY

## 2019-07-29 PROCEDURE — 25010000002 ONDANSETRON PER 1 MG: Performed by: ANESTHESIOLOGIST ASSISTANT

## 2019-07-29 PROCEDURE — 25010000002 PROPOFOL 200 MG/20ML EMULSION: Performed by: ANESTHESIOLOGIST ASSISTANT

## 2019-07-29 PROCEDURE — 25010000002 MIDAZOLAM PER 1 MG: Performed by: ANESTHESIOLOGIST ASSISTANT

## 2019-07-29 PROCEDURE — 63710000001 HYDROCODONE-ACETAMINOPHEN 5-325 MG TABLET: Performed by: PHYSICIAN ASSISTANT

## 2019-07-29 PROCEDURE — C9290 INJ, BUPIVACAINE LIPOSOME: HCPCS | Performed by: ORTHOPAEDIC SURGERY

## 2019-07-29 PROCEDURE — G0378 HOSPITAL OBSERVATION PER HR: HCPCS

## 2019-07-29 PROCEDURE — 63710000001 METHOCARBAMOL 500 MG TABLET: Performed by: PHYSICIAN ASSISTANT

## 2019-07-29 PROCEDURE — C1821 INTERSPINOUS IMPLANT: HCPCS | Performed by: ORTHOPAEDIC SURGERY

## 2019-07-29 DEVICE — IMPLANTABLE DEVICE
Type: IMPLANTABLE DEVICE | Site: SPINE LUMBAR | Status: FUNCTIONAL
Brand: COFLEX INTERLAMINAR TECHNOLOGY, 12MM

## 2019-07-29 DEVICE — IMPLANTABLE DEVICE
Type: IMPLANTABLE DEVICE | Site: SPINE LUMBAR | Status: FUNCTIONAL
Brand: COFLEX INTERLAMINAR TECHNOLOGY, 10MM

## 2019-07-29 RX ORDER — PROMETHAZINE HYDROCHLORIDE 25 MG/1
25 TABLET ORAL ONCE AS NEEDED
Status: DISCONTINUED | OUTPATIENT
Start: 2019-07-29 | End: 2019-07-29 | Stop reason: HOSPADM

## 2019-07-29 RX ORDER — GLYCOPYRROLATE 0.2 MG/ML
INJECTION INTRAMUSCULAR; INTRAVENOUS AS NEEDED
Status: DISCONTINUED | OUTPATIENT
Start: 2019-07-29 | End: 2019-07-29 | Stop reason: SURG

## 2019-07-29 RX ORDER — LABETALOL HYDROCHLORIDE 5 MG/ML
5 INJECTION, SOLUTION INTRAVENOUS
Status: DISCONTINUED | OUTPATIENT
Start: 2019-07-29 | End: 2019-07-29 | Stop reason: HOSPADM

## 2019-07-29 RX ORDER — SODIUM CHLORIDE, SODIUM LACTATE, POTASSIUM CHLORIDE, AND CALCIUM CHLORIDE .6; .31; .03; .02 G/100ML; G/100ML; G/100ML; G/100ML
20 INJECTION, SOLUTION INTRAVENOUS CONTINUOUS
Status: DISCONTINUED | OUTPATIENT
Start: 2019-07-29 | End: 2019-07-30 | Stop reason: HOSPADM

## 2019-07-29 RX ORDER — SODIUM CHLORIDE 0.9 % (FLUSH) 0.9 %
3-10 SYRINGE (ML) INJECTION AS NEEDED
Status: DISCONTINUED | OUTPATIENT
Start: 2019-07-29 | End: 2019-07-29 | Stop reason: HOSPADM

## 2019-07-29 RX ORDER — CELECOXIB 200 MG/1
200 CAPSULE ORAL 2 TIMES DAILY
Status: DISCONTINUED | OUTPATIENT
Start: 2019-07-29 | End: 2019-07-30 | Stop reason: HOSPADM

## 2019-07-29 RX ORDER — 0.9 % SODIUM CHLORIDE 0.9 %
VIAL (ML) INJECTION AS NEEDED
Status: DISCONTINUED | OUTPATIENT
Start: 2019-07-29 | End: 2019-07-29 | Stop reason: HOSPADM

## 2019-07-29 RX ORDER — ONDANSETRON 2 MG/ML
4 INJECTION INTRAMUSCULAR; INTRAVENOUS ONCE AS NEEDED
Status: DISCONTINUED | OUTPATIENT
Start: 2019-07-29 | End: 2019-07-29 | Stop reason: HOSPADM

## 2019-07-29 RX ORDER — PROPOFOL 10 MG/ML
INJECTION, EMULSION INTRAVENOUS AS NEEDED
Status: DISCONTINUED | OUTPATIENT
Start: 2019-07-29 | End: 2019-07-29 | Stop reason: SURG

## 2019-07-29 RX ORDER — DEXAMETHASONE SODIUM PHOSPHATE 4 MG/ML
INJECTION, SOLUTION INTRA-ARTICULAR; INTRALESIONAL; INTRAMUSCULAR; INTRAVENOUS; SOFT TISSUE AS NEEDED
Status: DISCONTINUED | OUTPATIENT
Start: 2019-07-29 | End: 2019-07-29 | Stop reason: SURG

## 2019-07-29 RX ORDER — NALOXONE HCL 0.4 MG/ML
0.4 VIAL (ML) INJECTION AS NEEDED
Status: DISCONTINUED | OUTPATIENT
Start: 2019-07-29 | End: 2019-07-29 | Stop reason: HOSPADM

## 2019-07-29 RX ORDER — MORPHINE SULFATE 4 MG/ML
1 INJECTION, SOLUTION INTRAMUSCULAR; INTRAVENOUS EVERY 4 HOURS PRN
Status: DISCONTINUED | OUTPATIENT
Start: 2019-07-29 | End: 2019-07-30 | Stop reason: HOSPADM

## 2019-07-29 RX ORDER — ONDANSETRON 2 MG/ML
INJECTION INTRAMUSCULAR; INTRAVENOUS AS NEEDED
Status: DISCONTINUED | OUTPATIENT
Start: 2019-07-29 | End: 2019-07-29 | Stop reason: SURG

## 2019-07-29 RX ORDER — METHOCARBAMOL 500 MG/1
1000 TABLET, FILM COATED ORAL 4 TIMES DAILY PRN
Status: DISCONTINUED | OUTPATIENT
Start: 2019-07-29 | End: 2019-07-30 | Stop reason: HOSPADM

## 2019-07-29 RX ORDER — HYDROMORPHONE HCL 110MG/55ML
0.25 PATIENT CONTROLLED ANALGESIA SYRINGE INTRAVENOUS
Status: DISCONTINUED | OUTPATIENT
Start: 2019-07-29 | End: 2019-07-29 | Stop reason: HOSPADM

## 2019-07-29 RX ORDER — SODIUM CHLORIDE 0.9 % (FLUSH) 0.9 %
1-10 SYRINGE (ML) INJECTION AS NEEDED
Status: DISCONTINUED | OUTPATIENT
Start: 2019-07-29 | End: 2019-07-30 | Stop reason: HOSPADM

## 2019-07-29 RX ORDER — NEOSTIGMINE METHYLSULFATE 5 MG/5 ML
SYRINGE (ML) INTRAVENOUS AS NEEDED
Status: DISCONTINUED | OUTPATIENT
Start: 2019-07-29 | End: 2019-07-29 | Stop reason: SURG

## 2019-07-29 RX ORDER — HYDRALAZINE HYDROCHLORIDE 20 MG/ML
5 INJECTION INTRAMUSCULAR; INTRAVENOUS
Status: DISCONTINUED | OUTPATIENT
Start: 2019-07-29 | End: 2019-07-29 | Stop reason: HOSPADM

## 2019-07-29 RX ORDER — PHENYLEPHRINE HCL IN 0.9% NACL 0.5 MG/5ML
.5-3 SYRINGE (ML) INTRAVENOUS
Status: DISCONTINUED | OUTPATIENT
Start: 2019-07-29 | End: 2019-07-29 | Stop reason: HOSPADM

## 2019-07-29 RX ORDER — PROMETHAZINE HYDROCHLORIDE 25 MG/ML
6.25 INJECTION, SOLUTION INTRAMUSCULAR; INTRAVENOUS ONCE AS NEEDED
Status: DISCONTINUED | OUTPATIENT
Start: 2019-07-29 | End: 2019-07-29 | Stop reason: HOSPADM

## 2019-07-29 RX ORDER — DIPHENHYDRAMINE HYDROCHLORIDE 50 MG/ML
12.5 INJECTION INTRAMUSCULAR; INTRAVENOUS
Status: DISCONTINUED | OUTPATIENT
Start: 2019-07-29 | End: 2019-07-29 | Stop reason: HOSPADM

## 2019-07-29 RX ORDER — IPRATROPIUM BROMIDE AND ALBUTEROL SULFATE 2.5; .5 MG/3ML; MG/3ML
3 SOLUTION RESPIRATORY (INHALATION) ONCE AS NEEDED
Status: DISCONTINUED | OUTPATIENT
Start: 2019-07-29 | End: 2019-07-29 | Stop reason: HOSPADM

## 2019-07-29 RX ORDER — NALOXONE HCL 0.4 MG/ML
0.4 VIAL (ML) INJECTION
Status: DISCONTINUED | OUTPATIENT
Start: 2019-07-29 | End: 2019-07-30 | Stop reason: HOSPADM

## 2019-07-29 RX ORDER — ROCURONIUM BROMIDE 10 MG/ML
INJECTION, SOLUTION INTRAVENOUS AS NEEDED
Status: DISCONTINUED | OUTPATIENT
Start: 2019-07-29 | End: 2019-07-29 | Stop reason: SURG

## 2019-07-29 RX ORDER — MIDAZOLAM HYDROCHLORIDE 1 MG/ML
INJECTION INTRAMUSCULAR; INTRAVENOUS AS NEEDED
Status: DISCONTINUED | OUTPATIENT
Start: 2019-07-29 | End: 2019-07-29 | Stop reason: SURG

## 2019-07-29 RX ORDER — SODIUM CHLORIDE, SODIUM LACTATE, POTASSIUM CHLORIDE, CALCIUM CHLORIDE 600; 310; 30; 20 MG/100ML; MG/100ML; MG/100ML; MG/100ML
9 INJECTION, SOLUTION INTRAVENOUS CONTINUOUS PRN
Status: DISCONTINUED | OUTPATIENT
Start: 2019-07-29 | End: 2019-07-30 | Stop reason: HOSPADM

## 2019-07-29 RX ORDER — LIDOCAINE HYDROCHLORIDE 10 MG/ML
INJECTION, SOLUTION EPIDURAL; INFILTRATION; INTRACAUDAL; PERINEURAL AS NEEDED
Status: DISCONTINUED | OUTPATIENT
Start: 2019-07-29 | End: 2019-07-29 | Stop reason: SURG

## 2019-07-29 RX ORDER — FLUMAZENIL 0.1 MG/ML
0.2 INJECTION INTRAVENOUS AS NEEDED
Status: DISCONTINUED | OUTPATIENT
Start: 2019-07-29 | End: 2019-07-29 | Stop reason: HOSPADM

## 2019-07-29 RX ORDER — FENTANYL CITRATE 50 UG/ML
INJECTION, SOLUTION INTRAMUSCULAR; INTRAVENOUS AS NEEDED
Status: DISCONTINUED | OUTPATIENT
Start: 2019-07-29 | End: 2019-07-29 | Stop reason: SURG

## 2019-07-29 RX ORDER — ONDANSETRON 2 MG/ML
4 INJECTION INTRAMUSCULAR; INTRAVENOUS EVERY 6 HOURS PRN
Status: DISCONTINUED | OUTPATIENT
Start: 2019-07-29 | End: 2019-07-30 | Stop reason: HOSPADM

## 2019-07-29 RX ORDER — PROMETHAZINE HYDROCHLORIDE 25 MG/1
25 SUPPOSITORY RECTAL ONCE AS NEEDED
Status: DISCONTINUED | OUTPATIENT
Start: 2019-07-29 | End: 2019-07-29 | Stop reason: HOSPADM

## 2019-07-29 RX ORDER — HYDROMORPHONE HCL 110MG/55ML
PATIENT CONTROLLED ANALGESIA SYRINGE INTRAVENOUS AS NEEDED
Status: DISCONTINUED | OUTPATIENT
Start: 2019-07-29 | End: 2019-07-29 | Stop reason: SURG

## 2019-07-29 RX ORDER — SODIUM CHLORIDE 0.9 % (FLUSH) 0.9 %
3 SYRINGE (ML) INJECTION EVERY 12 HOURS SCHEDULED
Status: DISCONTINUED | OUTPATIENT
Start: 2019-07-29 | End: 2019-07-30 | Stop reason: HOSPADM

## 2019-07-29 RX ORDER — EPHEDRINE SULFATE 50 MG/ML
5 INJECTION, SOLUTION INTRAVENOUS ONCE AS NEEDED
Status: DISCONTINUED | OUTPATIENT
Start: 2019-07-29 | End: 2019-07-29 | Stop reason: HOSPADM

## 2019-07-29 RX ORDER — HYDROCODONE BITARTRATE AND ACETAMINOPHEN 5; 325 MG/1; MG/1
1 TABLET ORAL EVERY 4 HOURS PRN
Status: DISCONTINUED | OUTPATIENT
Start: 2019-07-29 | End: 2019-07-30 | Stop reason: HOSPADM

## 2019-07-29 RX ORDER — CLINDAMYCIN PHOSPHATE 900 MG/50ML
900 INJECTION, SOLUTION INTRAVENOUS ONCE
Status: DISCONTINUED | OUTPATIENT
Start: 2019-07-29 | End: 2019-07-29

## 2019-07-29 RX ORDER — SODIUM CHLORIDE 0.9 % (FLUSH) 0.9 %
3 SYRINGE (ML) INJECTION EVERY 12 HOURS SCHEDULED
Status: DISCONTINUED | OUTPATIENT
Start: 2019-07-29 | End: 2019-07-29 | Stop reason: HOSPADM

## 2019-07-29 RX ORDER — BISACODYL 10 MG
10 SUPPOSITORY, RECTAL RECTAL DAILY PRN
Status: DISCONTINUED | OUTPATIENT
Start: 2019-07-29 | End: 2019-07-30 | Stop reason: HOSPADM

## 2019-07-29 RX ORDER — ACETAMINOPHEN 325 MG/1
650 TABLET ORAL EVERY 4 HOURS PRN
Status: DISCONTINUED | OUTPATIENT
Start: 2019-07-29 | End: 2019-07-30 | Stop reason: HOSPADM

## 2019-07-29 RX ORDER — ONDANSETRON 4 MG/1
4 TABLET, FILM COATED ORAL EVERY 6 HOURS PRN
Status: DISCONTINUED | OUTPATIENT
Start: 2019-07-29 | End: 2019-07-30 | Stop reason: HOSPADM

## 2019-07-29 RX ORDER — SODIUM CHLORIDE 450 MG/100ML
100 INJECTION, SOLUTION INTRAVENOUS CONTINUOUS
Status: DISCONTINUED | OUTPATIENT
Start: 2019-07-29 | End: 2019-07-30 | Stop reason: HOSPADM

## 2019-07-29 RX ORDER — MIDAZOLAM HYDROCHLORIDE 1 MG/ML
1 INJECTION INTRAMUSCULAR; INTRAVENOUS
Status: DISCONTINUED | OUTPATIENT
Start: 2019-07-29 | End: 2019-07-29 | Stop reason: HOSPADM

## 2019-07-29 RX ORDER — FENTANYL CITRATE 50 UG/ML
25 INJECTION, SOLUTION INTRAMUSCULAR; INTRAVENOUS
Status: DISCONTINUED | OUTPATIENT
Start: 2019-07-29 | End: 2019-07-29 | Stop reason: HOSPADM

## 2019-07-29 RX ADMIN — SODIUM CHLORIDE, PRESERVATIVE FREE 3 ML: 5 INJECTION INTRAVENOUS at 12:56

## 2019-07-29 RX ADMIN — FENTANYL CITRATE 50 MCG: 50 INJECTION, SOLUTION INTRAMUSCULAR; INTRAVENOUS at 07:15

## 2019-07-29 RX ADMIN — SODIUM CHLORIDE, SODIUM LACTATE, POTASSIUM CHLORIDE, AND CALCIUM CHLORIDE 9 ML/HR: .6; .31; .03; .02 INJECTION, SOLUTION INTRAVENOUS at 06:28

## 2019-07-29 RX ADMIN — Medication 2 MG: at 10:16

## 2019-07-29 RX ADMIN — CEFAZOLIN SODIUM 2 G: 1 INJECTION, POWDER, FOR SOLUTION INTRAMUSCULAR; INTRAVENOUS at 07:27

## 2019-07-29 RX ADMIN — POLYETHYLENE GLYCOL 3350 17 G: 17 POWDER, FOR SOLUTION ORAL at 14:03

## 2019-07-29 RX ADMIN — CEFAZOLIN SODIUM 2 G: 10 INJECTION, POWDER, FOR SOLUTION INTRAVENOUS at 22:16

## 2019-07-29 RX ADMIN — FENTANYL CITRATE 25 MCG: 50 INJECTION, SOLUTION INTRAMUSCULAR; INTRAVENOUS at 09:14

## 2019-07-29 RX ADMIN — HYDROCODONE BITARTRATE AND ACETAMINOPHEN 1 TABLET: 5; 325 TABLET ORAL at 12:50

## 2019-07-29 RX ADMIN — DEXAMETHASONE SODIUM PHOSPHATE 4 MG: 4 INJECTION, SOLUTION INTRAMUSCULAR; INTRAVENOUS at 07:32

## 2019-07-29 RX ADMIN — HYDROMORPHONE HYDROCHLORIDE 0.5 MG: 2 INJECTION INTRAMUSCULAR; INTRAVENOUS; SUBCUTANEOUS at 09:58

## 2019-07-29 RX ADMIN — HYDROMORPHONE HYDROCHLORIDE 0.25 MG: 2 INJECTION, SOLUTION INTRAMUSCULAR; INTRAVENOUS; SUBCUTANEOUS at 11:17

## 2019-07-29 RX ADMIN — SODIUM CHLORIDE 100 ML/HR: 450 INJECTION, SOLUTION INTRAVENOUS at 14:03

## 2019-07-29 RX ADMIN — MIDAZOLAM 2 MG: 1 INJECTION INTRAMUSCULAR; INTRAVENOUS at 07:15

## 2019-07-29 RX ADMIN — HYDROCODONE BITARTRATE AND ACETAMINOPHEN 1 TABLET: 5; 325 TABLET ORAL at 17:15

## 2019-07-29 RX ADMIN — PROPOFOL 200 MG: 10 INJECTION, EMULSION INTRAVENOUS at 07:15

## 2019-07-29 RX ADMIN — GLYCOPYRROLATE 0.2 MG: 0.2 INJECTION, SOLUTION INTRAMUSCULAR; INTRAVENOUS at 07:45

## 2019-07-29 RX ADMIN — ROCURONIUM BROMIDE 50 MG: 10 INJECTION, SOLUTION INTRAVENOUS at 07:16

## 2019-07-29 RX ADMIN — HYDROCODONE BITARTRATE AND ACETAMINOPHEN 1 TABLET: 5; 325 TABLET ORAL at 22:15

## 2019-07-29 RX ADMIN — ONDANSETRON 4 MG: 2 INJECTION INTRAMUSCULAR; INTRAVENOUS at 10:11

## 2019-07-29 RX ADMIN — METHOCARBAMOL TABLETS 1000 MG: 500 TABLET, COATED ORAL at 22:16

## 2019-07-29 RX ADMIN — SODIUM CHLORIDE 100 ML/HR: 450 INJECTION, SOLUTION INTRAVENOUS at 23:44

## 2019-07-29 RX ADMIN — FENTANYL CITRATE 50 MCG: 50 INJECTION, SOLUTION INTRAMUSCULAR; INTRAVENOUS at 07:43

## 2019-07-29 RX ADMIN — FENTANYL CITRATE 25 MCG: 50 INJECTION, SOLUTION INTRAMUSCULAR; INTRAVENOUS at 08:34

## 2019-07-29 RX ADMIN — CEFAZOLIN SODIUM 2 G: 10 INJECTION, POWDER, FOR SOLUTION INTRAVENOUS at 14:03

## 2019-07-29 RX ADMIN — METHOCARBAMOL TABLETS 1000 MG: 500 TABLET, COATED ORAL at 12:50

## 2019-07-29 RX ADMIN — SODIUM CHLORIDE, SODIUM LACTATE, POTASSIUM CHLORIDE, AND CALCIUM CHLORIDE: .6; .31; .03; .02 INJECTION, SOLUTION INTRAVENOUS at 09:45

## 2019-07-29 RX ADMIN — FENTANYL CITRATE 50 MCG: 50 INJECTION, SOLUTION INTRAMUSCULAR; INTRAVENOUS at 07:39

## 2019-07-29 RX ADMIN — SODIUM CHLORIDE, PRESERVATIVE FREE 3 ML: 5 INJECTION INTRAVENOUS at 22:17

## 2019-07-29 RX ADMIN — LIDOCAINE HYDROCHLORIDE 50 MG: 10 INJECTION, SOLUTION EPIDURAL; INFILTRATION; INTRACAUDAL; PERINEURAL at 07:15

## 2019-07-29 RX ADMIN — CELECOXIB 200 MG: 200 CAPSULE ORAL at 22:16

## 2019-07-29 RX ADMIN — GLYCOPYRROLATE 0.4 MG: 0.2 INJECTION, SOLUTION INTRAMUSCULAR; INTRAVENOUS at 10:16

## 2019-07-29 NOTE — ANESTHESIA POSTPROCEDURE EVALUATION
Patient: Daren Zambrano    Procedure Summary     Date:  07/29/19 Room / Location:  [facility] OR [address] [place] [address] MAIN OR    Anesthesia Start:  0712 Anesthesia Stop:  1040    Procedure:  PDC PARTIAL LAMINECTOMY OF L3-L5 WITH COFLEX IMPLANTATION, L5-S1 DECOMPRESSION LEFT SIDE DISCECTOMY (Bilateral Spine Lumbar) Diagnosis:       Spinal stenosis      (Spinal stenosis [M48.00])    Surgeon:  Isabel Mckinney MD Provider:  Naseem Muller MD    Anesthesia Type:  general ASA Status:  2          Anesthesia Type: general  Last vitals  BP   116/56 (07/29/19 1125)   Temp   98.1 °F (36.7 °C) (07/29/19 1036)   Pulse   65 (07/29/19 1125)   Resp   11 (07/29/19 1125)     SpO2   98 % (07/29/19 1125)     Post Anesthesia Care and Evaluation    Patient location during evaluation: PACU  Patient participation: complete - patient participated  Level of consciousness: awake  Pain scale: See nurse's notes for pain score.  Pain management: adequate  Airway patency: patent  Anesthetic complications: No anesthetic complications  PONV Status: none  Cardiovascular status: acceptable  Respiratory status: acceptable  Hydration status: acceptable    Comments: Patient seen and examined postoperatively; vital signs stable; SpO2 greater than or equal to 90%; cardiopulmonary status stable; nausea/vomiting adequately controlled; pain adequately controlled; no apparent anesthesia complications; patient discharged from anesthesia care when discharge criteria were met

## 2019-07-29 NOTE — ANESTHESIA PROCEDURE NOTES
Airway  Urgency: elective    Date/Time: 7/29/2019 7:19 AM  Airway not difficult    General Information and Staff    Patient location during procedure: OR  Anesthesiologist: Naseem Muller MD  CRNA: James Tinajero AA    Indications and Patient Condition  Indications for airway management: airway protection    Preoxygenated: yes  MILS maintained throughout  Mask difficulty assessment: 1 - vent by mask    Final Airway Details  Final airway type: endotracheal airway      Successful airway: ETT  Cuffed: yes   Successful intubation technique: direct laryngoscopy  Endotracheal tube insertion site: oral  Blade: Lisandra  Blade size: 4  ETT size (mm): 7.5  Cormack-Lehane Classification: grade I - full view of glottis  Placement verified by: chest auscultation and capnometry   Cuff volume (mL): 10  Measured from: lips  ETT to lips (cm): 23  Number of attempts at approach: 1    Additional Comments  Atraumatic intubation. Soft gauze bite block inserted

## 2019-07-29 NOTE — ANESTHESIA PREPROCEDURE EVALUATION
Anesthesia Evaluation     Patient summary reviewed and Nursing notes reviewed   NPO Solid Status: > 8 hours  NPO Liquid Status: > 8 hours           Airway   Mallampati: I  TM distance: >3 FB  Neck ROM: full  No difficulty expected  Dental - normal exam     Pulmonary - negative pulmonary ROS and normal exam    breath sounds clear to auscultation  Cardiovascular - normal exam    ECG reviewed  Rhythm: regular  Rate: normal    (+) hypertension well controlled,       Neuro/Psych  (+) numbness,     GI/Hepatic/Renal/Endo - negative ROS     Musculoskeletal     Abdominal  - normal exam    Bowel sounds: normal.   Substance History - negative use     OB/GYN negative ob/gyn ROS         Other   (+) arthritis                   Anesthesia Plan    ASA 2     general     intravenous induction   Anesthetic plan, all risks, benefits, and alternatives have been provided, discussed and informed consent has been obtained with: patient.  Use of blood products discussed with patient  Consented to blood products.

## 2019-07-30 ENCOUNTER — TELEPHONE (OUTPATIENT)
Dept: FAMILY MEDICINE CLINIC | Facility: CLINIC | Age: 69
End: 2019-07-30

## 2019-07-30 VITALS
WEIGHT: 212.3 LBS | RESPIRATION RATE: 16 BRPM | DIASTOLIC BLOOD PRESSURE: 74 MMHG | SYSTOLIC BLOOD PRESSURE: 126 MMHG | HEIGHT: 71 IN | BODY MASS INDEX: 29.72 KG/M2 | TEMPERATURE: 98.8 F | HEART RATE: 63 BPM | OXYGEN SATURATION: 96 %

## 2019-07-30 PROCEDURE — 63710000001 TAMSULOSIN 0.4 MG CAPSULE: Performed by: ORTHOPAEDIC SURGERY

## 2019-07-30 PROCEDURE — A9270 NON-COVERED ITEM OR SERVICE: HCPCS | Performed by: PHYSICIAN ASSISTANT

## 2019-07-30 PROCEDURE — A9270 NON-COVERED ITEM OR SERVICE: HCPCS | Performed by: ORTHOPAEDIC SURGERY

## 2019-07-30 PROCEDURE — 97116 GAIT TRAINING THERAPY: CPT

## 2019-07-30 PROCEDURE — G0378 HOSPITAL OBSERVATION PER HR: HCPCS

## 2019-07-30 PROCEDURE — 63710000001 HYDROCHLOROTHIAZIDE 12.5 MG TABLET 100 EACH BOTTLE: Performed by: PHYSICIAN ASSISTANT

## 2019-07-30 PROCEDURE — 63710000001 CELECOXIB 200 MG CAPSULE: Performed by: PHYSICIAN ASSISTANT

## 2019-07-30 PROCEDURE — 99024 POSTOP FOLLOW-UP VISIT: CPT | Performed by: ORTHOPAEDIC SURGERY

## 2019-07-30 PROCEDURE — 63710000001 METHOCARBAMOL 500 MG TABLET: Performed by: PHYSICIAN ASSISTANT

## 2019-07-30 PROCEDURE — 63710000001 POLYETHYLENE GLYCOL PACK: Performed by: PHYSICIAN ASSISTANT

## 2019-07-30 PROCEDURE — 97162 PT EVAL MOD COMPLEX 30 MIN: CPT

## 2019-07-30 PROCEDURE — 63710000001 LOSARTAN 50 MG TABLET 100 EACH BOX: Performed by: PHYSICIAN ASSISTANT

## 2019-07-30 RX ORDER — GABAPENTIN 300 MG/1
300 CAPSULE ORAL 2 TIMES DAILY
Qty: 60 CAPSULE | Refills: 0 | Status: SHIPPED | OUTPATIENT
Start: 2019-07-30 | End: 2019-08-29

## 2019-07-30 RX ORDER — TAMSULOSIN HYDROCHLORIDE 0.4 MG/1
0.4 CAPSULE ORAL DAILY
Qty: 30 CAPSULE | Refills: 0 | Status: SHIPPED | OUTPATIENT
Start: 2019-07-31 | End: 2019-11-06

## 2019-07-30 RX ORDER — TAMSULOSIN HYDROCHLORIDE 0.4 MG/1
0.4 CAPSULE ORAL DAILY
Status: DISCONTINUED | OUTPATIENT
Start: 2019-07-30 | End: 2019-07-30 | Stop reason: HOSPADM

## 2019-07-30 RX ORDER — METHYLPREDNISOLONE 4 MG/1
21 TABLET ORAL DAILY
Qty: 21 TABLET | Refills: 0 | Status: SHIPPED | OUTPATIENT
Start: 2019-07-30 | End: 2019-11-06

## 2019-07-30 RX ADMIN — POLYETHYLENE GLYCOL 3350 17 G: 17 POWDER, FOR SOLUTION ORAL at 09:20

## 2019-07-30 RX ADMIN — CELECOXIB 200 MG: 200 CAPSULE ORAL at 09:19

## 2019-07-30 RX ADMIN — TAMSULOSIN HYDROCHLORIDE 0.4 MG: 0.4 CAPSULE ORAL at 09:56

## 2019-07-30 RX ADMIN — METHOCARBAMOL TABLETS 1000 MG: 500 TABLET, COATED ORAL at 09:19

## 2019-07-30 RX ADMIN — HYDROCHLOROTHIAZIDE: 12.5 TABLET ORAL at 09:19

## 2019-07-30 RX ADMIN — SODIUM CHLORIDE, PRESERVATIVE FREE 3 ML: 5 INJECTION INTRAVENOUS at 09:20

## 2019-07-31 ENCOUNTER — READMISSION MANAGEMENT (OUTPATIENT)
Dept: CALL CENTER | Facility: HOSPITAL | Age: 69
End: 2019-07-31

## 2019-07-31 NOTE — OUTREACH NOTE
Prep Survey      Responses   Facility patient discharged from?  [facility]   Is patient eligible?  Yes   Discharge diagnosis  Spinal stenosis with neurogenic location,     PDC PARTIAL LAMINECTOMY OF L3-L5 WITH COFLEX IMPLANTATION, L5-S1 DECOMPRESSION LEFT SIDE DISCECTOMY   Does the patient have one of the following disease processes/diagnoses(primary or secondary)?  General Surgery   Does the patient have Home health ordered?  No   Is there a DME ordered?  No   Prep survey completed?  Yes          Mel Charles RN

## 2019-08-01 ENCOUNTER — READMISSION MANAGEMENT (OUTPATIENT)
Dept: CALL CENTER | Facility: HOSPITAL | Age: 69
End: 2019-08-01

## 2019-08-01 ENCOUNTER — TRANSITIONAL CARE MANAGEMENT TELEPHONE ENCOUNTER (OUTPATIENT)
Dept: FAMILY MEDICINE CLINIC | Facility: CLINIC | Age: 69
End: 2019-08-01

## 2019-08-01 NOTE — OUTREACH NOTE
General Surgery Week 1 Survey      Responses   Facility patient discharged from?  [facility]   Does the patient have one of the following disease processes/diagnoses(primary or secondary)?  General Surgery   Is there a successful TCM telephone encounter documented?  No   Week 1 attempt successful?  Yes   Call start time  1458   Call end time  1501   Discharge diagnosis  Spinal stenosis with neurogenic location,     PDC PARTIAL LAMINECTOMY OF L3-L5 WITH COFLEX IMPLANTATION, L5-S1 DECOMPRESSION LEFT SIDE DISCECTOMY   Meds reviewed with patient/caregiver?  Yes   Is the patient having any side effects they believe may be caused by any medication additions or changes?  No   Does the patient have all medications related to this admission filled (includes all antibiotics, pain medications, etc.)  Yes   Is the patient taking all medications as directed (includes completed medication regime)?  Yes   Does the patient have a follow up appointment scheduled with their surgeon?  Yes   Has the patient kept scheduled appointments due by today?  N/A   Has home health visited the patient within 72 hours of discharge?  N/A   Did the patient receive a copy of their discharge instructions?  Yes   Nursing interventions  Reviewed instructions with patient   What is the patient's perception of their health status since discharge?  Improving   Is the patient /caregiver able to teach back basic post-op care?  Keep incision areas clean,dry and protected, Lifting as instructed by MD in discharge instructions, No tub bath, swimming, or hot tub until instructed by MD, Drive as instructed by MD in discharge instructions, Do not remove steri-strips   Is the patient/caregiver able to teach back signs and symptoms of incisional infection?  Fever, Pus or odor from incision, Incisional warmth, Increased drainage or bleeding, Increased redness, swelling or pain at the incisonal site   Is the patient/caregiver able to teach back steps to recovery at home?   Set small, achievable goals for return to baseline health, Rest and rebuild strength, gradually increase activity, Make a list of questions for surgeon's appointment   Is the patient/caregiver able to teach back the hierarchy of who to call/visit for symptoms/problems? PCP, Specialist, Home health nurse, Urgent Care, ED, 911  Yes   Week 1 call completed?  Yes   Revoked  No further contact(revokes)-requires comment          Ariane Urbina LPN

## 2019-08-09 ENCOUNTER — OFFICE VISIT (OUTPATIENT)
Dept: FAMILY MEDICINE CLINIC | Facility: CLINIC | Age: 69
End: 2019-08-09

## 2019-08-09 VITALS
WEIGHT: 213.8 LBS | TEMPERATURE: 99.1 F | DIASTOLIC BLOOD PRESSURE: 62 MMHG | HEART RATE: 66 BPM | SYSTOLIC BLOOD PRESSURE: 134 MMHG | HEIGHT: 71 IN | BODY MASS INDEX: 29.93 KG/M2 | OXYGEN SATURATION: 95 % | RESPIRATION RATE: 16 BRPM

## 2019-08-09 DIAGNOSIS — IMO0001 TRANSITION OF CARE PERFORMED WITH SHARING OF CLINICAL SUMMARY: Primary | ICD-10-CM

## 2019-08-09 DIAGNOSIS — M48.062 SPINAL STENOSIS OF LUMBAR REGION WITH NEUROGENIC CLAUDICATION: ICD-10-CM

## 2019-08-09 PROCEDURE — 99495 TRANSJ CARE MGMT MOD F2F 14D: CPT | Performed by: INTERNAL MEDICINE

## 2019-08-09 NOTE — PROGRESS NOTES
"Rooming Tab(CC,VS,Pt Hx,Fall Screen)  Chief Complaint   Patient presents with   • Transitional Care Management       Subjective   Pt here for transition of care- had lumbar surgery- had nocturia  And better,  JARET drain resolved still with increased urination-  But slowly improving- had retention day after and had to I/O cath- now with frequency. No blood or pain  Bowels normal, starting to move around more- no driving. Initial pain is gone, now with post surgerical pain.  No longer on narcotics during the day. Only tylenol. Off work for 6-8 weeks- eager to go back part time in 5-6 weeks though. Here today with wife  I have reviewed and updated his medications, medical history and problem list during today's office visit.     Patient Care Team:  Mei Kimball MD as PCP - General (Internal Medicine)    Problem List Tab  Medications Tab  Synopsis Tab  Chart Review Tab  Care Everywhere Tab  Immunizations Tab  Patient History Tab    Social History     Tobacco Use   • Smoking status: Former Smoker     Types: Cigarettes     Last attempt to quit: 7/10/1977     Years since quittin.1   • Smokeless tobacco: Never Used   Substance Use Topics   • Alcohol use: Yes     Alcohol/week: 0.6 oz     Types: 1 Cans of beer per week     Frequency: Never       Review of Systems   Constitutional: Negative for fever.   Respiratory: Negative for wheezing.    Gastrointestinal: Positive for abdominal distention.   Musculoskeletal: Positive for back pain. Negative for arthralgias.   Neurological: Positive for weakness.       Objective     Rooming Tab(CC,VS,Pt Hx,Fall Screen)  /62 (BP Location: Left arm, Patient Position: Sitting)   Pulse 66   Temp 99.1 °F (37.3 °C) (Oral)   Resp 16   Ht 180.3 cm (71\")   Wt 97 kg (213 lb 12.8 oz)   SpO2 95%   BMI 29.82 kg/m²     Body mass index is 29.82 kg/m².    Physical Exam   Constitutional: He appears well-developed and well-nourished.   Cardiovascular: Normal rate and regular rhythm. "   No murmur heard.  Pulmonary/Chest: Effort normal and breath sounds normal. No respiratory distress.   Abdominal: Soft. Bowel sounds are normal. He exhibits no distension.   Musculoskeletal:   Incision well healing. Still with swelling lumbar. No radicular pain   Skin: Skin is warm.   Nursing note and vitals reviewed.       Statin Choice Calculator  Data Reviewed:               Lab Results   Component Value Date    BUN 20 07/17/2019    CREATININE 1.00 07/17/2019    EGFRIFNONA 74 07/17/2019     07/17/2019    K 4.3 07/17/2019     (L) 07/17/2019    CALCIUM 9.6 07/17/2019    WBC 5.20 07/17/2019    RBC 4.75 07/17/2019    HCT 45.2 07/17/2019    MCV 95.1 07/17/2019    MCH 32.2 07/17/2019    INR 1.00 07/17/2019    OUHX95DL 34.5 06/24/2019      Assessment/Plan   Order Review Tab  Health Maintenance Tab  Patient Plan/Order Tab  Diagnoses and all orders for this visit:    1. Transition of care performed with sharing of clinical summary (Primary)  Assessment & Plan:  Doing well post op-weaning narcotics, eating well and getting more activity      2. Spinal stenosis of lumbar region with neurogenic claudication  Assessment & Plan:  healing well from surgery        Wrapup Tab  Return in about 3 months (around 11/9/2019).      They were informed of the diagnosis and treatment plan and directed to f/u for any further problems or concerns.    Transitional Care Management Certification  I certify that the following are true:  1. Communication was made within 2 business days of discharge.  2. Complexity of Medical Decision Making is moderate.  3. Face to face visit occurred within 14 days.    *Note: 50860 is for high complexity patients with a face to face visit within 7 days of discharge.  95200 is for high complexity patients with a face to face on days 8-14 post discharge or medium complexity with face to face visit within 14 days post discharge.

## 2019-08-17 PROBLEM — Z78.9 TRANSITION OF CARE PERFORMED WITH SHARING OF CLINICAL SUMMARY: Status: ACTIVE | Noted: 2019-08-17

## 2019-08-17 PROBLEM — IMO0001 TRANSITION OF CARE PERFORMED WITH SHARING OF CLINICAL SUMMARY: Status: ACTIVE | Noted: 2019-08-17

## 2019-09-10 ENCOUNTER — OFFICE VISIT (OUTPATIENT)
Dept: ORTHOPEDIC SURGERY | Facility: CLINIC | Age: 69
End: 2019-09-10

## 2019-09-10 VITALS
HEIGHT: 71 IN | DIASTOLIC BLOOD PRESSURE: 81 MMHG | BODY MASS INDEX: 30.24 KG/M2 | WEIGHT: 216 LBS | SYSTOLIC BLOOD PRESSURE: 137 MMHG | HEART RATE: 52 BPM

## 2019-09-10 DIAGNOSIS — M16.12 PRIMARY OSTEOARTHRITIS OF LEFT HIP: ICD-10-CM

## 2019-09-10 DIAGNOSIS — M48.061 SPINAL STENOSIS OF LUMBAR REGION, UNSPECIFIED WHETHER NEUROGENIC CLAUDICATION PRESENT: Primary | ICD-10-CM

## 2019-09-10 PROCEDURE — 99024 POSTOP FOLLOW-UP VISIT: CPT | Performed by: PHYSICIAN ASSISTANT

## 2019-09-10 NOTE — PROGRESS NOTES
"Orthopedic Spine Post Operative Note      Primary Care Provider: Mei Kimball MD    Patient Name: Daren Zambrano  Patient Age: 69 y.o.  Chief Complaint: had concerns including Back Pain (7/29/2019 PDC, partial laminectomy L3-L5 w coflex, L5-S1 decomp left side discectomy).    History of Present Illness: Daren Zambrano is a 69 y.o. year old male here 6 weeks status post decompression L3-L5 with an plantation of L3-L5, left discectomy L5-S1.  has generally done well but has had some up-and-down with some some residual left leg pain on and off he also has left groin pain.  Well today.  It is typically aggravated with prolonged standing or walking.    Imaging:  X-rays of lumbar spine show generative scoliosis superior to the area of surgery which is unchanged, implantation is intact with no signs of fracture.  Bilateral hip osteoporosis left worse than right    Assessment:   1. Spinal stenosis of lumbar region, unspecified whether neurogenic claudication present    2. Primary osteoarthritis of left hip        Plan:  Continue current treatment we reviewed her surgeons in details and discussed the option of adding gabapentin if his pain gets worse we also talked about the notion of a left articular hip injection of his left groin pain worsens.    Objective   /81 (BP Location: Left arm, Patient Position: Sitting, Cuff Size: Large Adult)   Pulse 52   Ht 180.3 cm (71\")   Wt 98 kg (216 lb)   BMI 30.13 kg/m²     CONSTITUTIONAL: well nourished, well-developed, alert, oriented, in no acute distress   COMMUNICATION AND VOICE: able to communicate normally, normal voice quality  HEAD: normocephalic, atraumatic, no tenderness,  FACE: appearance normal,  facial motion symmetric  CHEST/RESPIRATORY: normal respiratory effort   CARDIOVASCULAR: no cyanosis or edema   SKIN: Incision is well healed.no edema, induration, fluctuance  MUSCULOSKELETAL: body movement grossly normal, normal gait and station " NEUROLOGICAL/PSYCHIATRIC: oriented appropriately for age, mood normal, affect appropriate      Physical Exam      Incision is well healed.no edema, induration, fluctuance  1. Spinal stenosis of lumbar region, unspecified whether neurogenic claudication present    2. Primary osteoarthritis of left hip        Orders Placed This Encounter   Procedures   • XR Spine Lumbar 2 or 3 View     Scheduling Instructions:      rm 19      LSpine ap/lat      7/29/2019 PDC, partial laminectomy L3-L5 w coflex, L5-S1 decomp left side discectomy      10:04     Order Specific Question:   Reason for Exam:     Answer:   low back

## 2019-11-06 ENCOUNTER — OFFICE VISIT (OUTPATIENT)
Dept: ORTHOPEDIC SURGERY | Facility: CLINIC | Age: 69
End: 2019-11-06

## 2019-11-06 VITALS
BODY MASS INDEX: 30.85 KG/M2 | WEIGHT: 220.4 LBS | HEART RATE: 65 BPM | DIASTOLIC BLOOD PRESSURE: 83 MMHG | HEIGHT: 71 IN | SYSTOLIC BLOOD PRESSURE: 157 MMHG

## 2019-11-06 DIAGNOSIS — M48.061 SPINAL STENOSIS OF LUMBAR REGION, UNSPECIFIED WHETHER NEUROGENIC CLAUDICATION PRESENT: Primary | ICD-10-CM

## 2019-11-06 PROCEDURE — 99213 OFFICE O/P EST LOW 20 MIN: CPT | Performed by: ORTHOPAEDIC SURGERY

## 2019-11-06 RX ORDER — METHOCARBAMOL 750 MG/1
750 TABLET, FILM COATED ORAL 3 TIMES DAILY
Qty: 90 TABLET | Refills: 0 | Status: SHIPPED | OUTPATIENT
Start: 2019-11-06 | End: 2020-05-28

## 2019-11-06 RX ORDER — GABAPENTIN 300 MG/1
300 CAPSULE ORAL 3 TIMES DAILY
Qty: 90 CAPSULE | Refills: 0 | Status: SHIPPED | OUTPATIENT
Start: 2019-11-06 | End: 2019-11-26 | Stop reason: SDUPTHER

## 2019-11-06 NOTE — PROGRESS NOTES
Visit Type: Follow Up  Referring Provider: No ref. provider found  Primary Care Provider: Mei Kimball MD    Patient Name: Daren Zambrano  Patient Age: 69 y.o.  Chief Complaint: had concerns including Pain of the Lumbar Spine (Increased pain trouble walking / standing).    Subjective   History of Present Illness: This patient is here today for follow-up patient is a status post posterior decompression with implantation of Coflex at the level of L3-L5 patient was extremely doing well following his surgery he was able to walk without difficulty the patient started to have minimal lower back pain, intractable pain in his left buttock.      Review of Systems   Musculoskeletal: Positive for back pain and myalgias.   Neurological: Positive for numbness.       The following portions of the patient's history were reviewed and updated as appropriate: allergies, current medications, past family history, past medical history, past social history, past surgical history and problem list.    Past Medical History:   Diagnosis Date   • Clavicle fracture    • Hypertension    • Osteoarthritis    • Slow to wake up after anesthesia        Past Surgical History:   Procedure Laterality Date   • DUPUYTREN CONTRACTURE RELEASE     • FOOT SURGERY  1978    TUMOR REMOVED   • KNEE ARTHROSCOPY Right     2013 AND 2016   • LUMBAR LAMINECTOMY DISCECTOMY DECOMPRESSION Bilateral 7/29/2019    Procedure: PDC PARTIAL LAMINECTOMY OF L3-L5 WITH COFLEX IMPLANTATION, L5-S1 DECOMPRESSION LEFT SIDE DISCECTOMY;  Surgeon: Isabel Mckinney MD;  Location: [facility] OR;  Service: Orthopedic Spine   • RESECTION DISTAL CLAVICLE  2000       Vitals:    11/06/19 1429   BP: 157/83   Pulse: 65       Patient Active Problem List   Diagnosis   • Spinal stenosis   • Osteoarthritis   • Sciatica   • Transition of care performed with sharing of clinical summary   • Primary osteoarthritis of left hip       Family History Summary:  family history includes Cancer in his  father; Dementia in his mother; Other in his mother.    Social History     Socioeconomic History   • Marital status:      Spouse name: Not on file   • Number of children: Not on file   • Years of education: Not on file   • Highest education level: Not on file   Tobacco Use   • Smoking status: Former Smoker     Types: Cigarettes     Last attempt to quit: 7/10/1977     Years since quittin.3   • Smokeless tobacco: Never Used   Substance and Sexual Activity   • Alcohol use: Yes     Alcohol/week: 0.6 oz     Types: 1 Cans of beer per week     Frequency: Never   • Drug use: No   • Sexual activity: Defer         Current Outpatient Medications:   •  aspirin 81 MG EC tablet, Take 81 mg by mouth Daily. Stop 19 for surgery, Disp: , Rfl:   •  celecoxib (CeleBREX) 200 MG capsule, 200 mg 2 (Two) Times a Day. Stop 19 for surgery, Disp: , Rfl:   •  losartan-hydrochlorothiazide (HYZAAR) 50-12.5 MG per tablet, Take 1 tablet by mouth Every Morning. Do not take 24 hours prior to surgery.  Last dose to be 19 before 0800 am, Disp: , Rfl:   •  Multiple Vitamins-Minerals (MULTIVITAMIN WITH MINERALS) tablet tablet, Take 1 tablet by mouth Daily. Stop 19 for surgery, Disp: , Rfl:   •  Omega-3 Fatty Acids (FISH OIL OMEGA-3 PO), Take 1 tablet by mouth Daily. 1000/300mg.  Stop 19 for surgery, Disp: , Rfl:   •  vitamin E 100 UNIT capsule, Take 400 Units by mouth Daily. Stop 19 for surgery, Disp: , Rfl:   •  gabapentin (NEURONTIN) 300 MG capsule, Take 1 capsule by mouth 3 (Three) Times a Day., Disp: 90 capsule, Rfl: 0  •  methocarbamol (ROBAXIN) 750 MG tablet, Take 1 tablet by mouth 3 (Three) Times a Day., Disp: 90 tablet, Rfl: 0    No Known Allergies        Objective   Physical Exam  Back Exam     Tenderness   The patient is experiencing tenderness in the lumbar.    Range of Motion   Extension:  60 abnormal   Flexion:  50 abnormal   Lateral bend right:  50 abnormal   Lateral bend left:  40 abnormal    Rotation right:  30 abnormal   Rotation left:  40 abnormal     Muscle Strength   The patient has normal back strength.    Tests   Straight leg raise right: negative  Straight leg raise left: negative    Reflexes   Patellar: Hyporeflexic  Achilles: Hyporeflexic  Biceps: Hyporeflexic  Babinski's sign: normal     Other   Toe walk: normal  Heel walk: normal  Sensation: normal  Gait: normal               Impression and Plan: His lumbar spine x-rays compatible with good positioning of implantation, residual scoliosis of lumbar spine and profound osteoarthritis of his hip joint left worse than right.  I am going to send this patient for a course of physical therapy I am going to give him Robaxin 750 mg 3 times daily gabapentin 300 mg 3 times daily I would like to see this patient my office in the next 6 weeks if his pain does not get better the patient needs left hip intra-articular injection as a diagnostic test.    Spinal stenosis of lumbar region, unspecified whether neurogenic claudication present [M48.061]     Orders Placed This Encounter   Procedures   • XR Spine Lumbar AP & Lateral     Scheduling Instructions:       16      1431      Lspine ap and lat      Increased low back pain, Pos top 7/29/2019 PDC partial laminectomy L3-L5 w/ coflex, L5-S1 decompression Left side discectomy     Order Specific Question:   Reason for Exam:     Answer:   low back pain   • Ambulatory Referral to Physical Therapy Evaluate and treat; Stretching, Strengthening     Referral Priority:   Routine     Referral Type:   Therapy     Referral Reason:   Specialty Services Required     Requested Specialty:   Physical Therapy     Number of Visits Requested:   1               Isabel Mckinney MD  11/06/19  4:17 PM

## 2019-11-20 ENCOUNTER — TREATMENT (OUTPATIENT)
Dept: PHYSICAL THERAPY | Facility: CLINIC | Age: 69
End: 2019-11-20

## 2019-11-20 DIAGNOSIS — M48.061 SPINAL STENOSIS, LUMBAR REGION, WITHOUT NEUROGENIC CLAUDICATION: Primary | ICD-10-CM

## 2019-11-20 PROCEDURE — 97162 PT EVAL MOD COMPLEX 30 MIN: CPT | Performed by: PHYSICAL THERAPIST

## 2019-11-20 PROCEDURE — 97110 THERAPEUTIC EXERCISES: CPT | Performed by: PHYSICAL THERAPIST

## 2019-11-20 NOTE — PROGRESS NOTES
Physical Therapy Initial Evaluation and Plan of Care    Patient: Daren Zambrano   : 1950  Diagnosis/ICD-10 Code:  Spinal stenosis, lumbar region, without neurogenic claudication [M48.061]  Referring practitioner: Isabel Mckinney MD  Date of Initial Visit: 2019  Today's Date: 2019  Patient seen for 1 sessions           Subjective Questionnaire: Oswestry: 58%      Subjective Evaluation    History of Present Illness  Mechanism of injury: Presents with chronic low back pain, eventually had surgery. Doesn't seem to be any better now. Has N/T in his left lower leg and has left hip pain, also knee pain. On average is getting 2,500-3,000 steps per day.     Quality of life: good    Pain  Current pain ratin  At worst pain ratin  Quality: dull ache and sharp  Relieving factors: rest and change in position  Aggravating factors: ambulation and standing  Progression: no change    Patient Goals  Patient goals for therapy: decreased pain and increased strength  Patient goal: Wants to be able to play golf and walk on uneven surfaces.          Objective       Postural Observations    Additional Postural Observation Details  Decreased lumbar lordosis, shifted to right    Ambulate: decreased stance time LLE, L hip trendelenburg, shifted to right    Palpation     Left Hip Palpation Comments   Lumbar paraspinals: atrophy.     Right Hip Palpation Comments   Lumber paraspinals: atrophy.     Neurological Testing     Sensation     Lumbar   Left   Diminished: light touch    Comments   Left light touch: NT left lateral calf and foot    Active Range of Motion     Additional Active Range of Motion Details  Lumbar AROM:  Flexion: mod limitation no pain just reports tighness  Extension: severe limitation and caused pain down left leg  Left SB: mod limitation no pain        Right SB: mod limitation and caused pain down left leg  Left rot: severe limitation no pain         Right rot: severe limitation no pain      Passive  Range of Motion   Left Hip   Flexion: 105 degrees   External rotation (90/90): 30 degrees   Internal rotation (90/90): 5 degrees     Right Hip   Flexion: 108 degrees   External rotation (90/90): 37 degrees   Internal rotation (90/90): 10 degrees     Additional Passive Range of Motion Details  Left hip IR reproduced groin pain    Strength/Myotome Testing     Lumbar   Left   Toe walk: normal    Right   Toe walk: abnormal    Left Hip   Planes of Motion   Abduction: 4-  Adduction: 4+    Right Hip   Planes of Motion   Abduction: 4-  Adduction: 4+    Left Knee   Flexion: 4  Extension: 4    Right Knee   Flexion: 4  Extension: 4    Left Ankle/Foot   Dorsiflexion: 5  Plantar flexion: 3    Right Ankle/Foot   Dorsiflexion: 5  Plantar flexion: 2    Additional Strength Details  Reports long history of right calf weakness and inability to push up on to his toes    Tests     Lumbar     Left   Negative passive SLR.     Right   Positive repeated side glide.   Negative passive SLR.     Left Hip   Positive YVON and scour.   90/90 SLR: Positive.     Additional Tests Details  LLE LAT: positive response with less hip pain         Assessment & Plan     Assessment  Impairments: abnormal or restricted ROM, activity intolerance, impaired physical strength, lacks appropriate home exercise program and pain with function  Assessment details: The patient is a 69 y.o. male who presents to physical therapy today for low back pain and LLE radiculopathy. Upon initial evaluation, the patient demonstrates the following impairments: decreased lumbar and hip mobility, core and hip weakness, impaired sensation, LLE gross weakness, impaired gait mechanics. Due to these impairments, the patient is unable to ambulate at PLOF levels, exercise, or work. The patient would benefit from skilled PT services to address functional limitations and impairments and to improve patient quality of life.      Prognosis: good    Goals  Plan Goals: STG:  Pt will be  independent and compliant with initial HEP in 2 weeks.  Pt will report a 25% improvement in symptoms since starting therapy in 3 weeks.  Pt will report pain level at worst <7 during walking activity in 3 weeks.  Pt will be independent with postural corrections in 2 weeks in order to decrease strain on back.   Pt will report ability to increase average steps per day to 4,000 or greater.  LTG:  Pt will be independent with final HEP for self-management of condition by DC.  Pt will improve score on Oswesty to less than 40% by DC.   Pt will report a 75% improvement in symptoms by DC in order to allow return to PLOF.  Pt will be able to increase average steps per day to >8,000 with manageable pain level by DC.       Plan  Therapy options: will be seen for skilled physical therapy services  Planned modality interventions: cryotherapy, electrical stimulation/Russian stimulation, TENS, traction and thermotherapy (hydrocollator packs)  Planned therapy interventions: abdominal trunk stabilization, body mechanics training, flexibility, functional ROM exercises, gait training, joint mobilization, home exercise program, manual therapy, neuromuscular re-education, postural training, soft tissue mobilization, spinal/joint mobilization, strengthening, stretching, therapeutic activities and motor coordination training  Other planned therapy interventions: Dry needling  Frequency: 2x week  Treatment plan discussed with: patient  Plan details: 30 visits  Lumbar sidelying decompression over pillow & manual therapy, left hip mobilizations, left hip stretching, hooklying core and hip ex (PPT, hip abd, LTR).  May consider dry needling.        See flowsheets for treatment detail.    History # of Personal Factors and/or Comorbidities: MODERATE (1-2)  Examination of Body System(s): # of elements: MODERATE (3)  Clinical Presentation: EVOLVING  Clinical Decision Making: MODERATE      Timed:         Manual Therapy:    6     mins  55772;      Therapeutic Exercise:    10     mins  81363;     Neuromuscular Cameron:        mins  00406;    Therapeutic Activity:          mins  92242;     Gait Training:           mins  29030;     Ultrasound:          mins  54799;    Ionto                                   mins   48597  Self Care                            mins   40764      Un-Timed:  Electrical Stimulation:         mins  83335 ( );  Dry Needling          mins self-pay  Traction          mins 56022  Low Eval          Mins  63562  Mod Eval     45     Mins  23516  High Eval                            Mins  44020  Re-Eval                               mins  48463      Timed Treatment:   16   mins   Total Treatment:     61   mins    PT SIGNATURE: Rochelle Prasad, PT   DATE TREATMENT INITIATED: 11/20/2019    Initial Certification  Certification Period: 2/18/2020  I certify that the therapy services are furnished while this patient is under my care.  The services outlined above are required by this patient, and will be reviewed every 90 days.     PHYSICIAN: Isabel Mckinney MD      DATE:     Please sign and return via fax to [phone].. Thank you, [facility].

## 2019-11-26 ENCOUNTER — OFFICE VISIT (OUTPATIENT)
Dept: FAMILY MEDICINE CLINIC | Facility: CLINIC | Age: 69
End: 2019-11-26

## 2019-11-26 ENCOUNTER — HOSPITAL ENCOUNTER (OUTPATIENT)
Dept: GENERAL RADIOLOGY | Facility: HOSPITAL | Age: 69
Discharge: HOME | End: 2019-11-26
Admitting: INTERNAL MEDICINE

## 2019-11-26 ENCOUNTER — HOSPITAL ENCOUNTER (OUTPATIENT)
Dept: GENERAL RADIOLOGY | Facility: HOSPITAL | Age: 69
Discharge: HOME | End: 2019-11-26

## 2019-11-26 VITALS
RESPIRATION RATE: 8 BRPM | TEMPERATURE: 98.3 F | BODY MASS INDEX: 31.1 KG/M2 | WEIGHT: 223 LBS | SYSTOLIC BLOOD PRESSURE: 150 MMHG | DIASTOLIC BLOOD PRESSURE: 78 MMHG | HEART RATE: 76 BPM

## 2019-11-26 DIAGNOSIS — M48.061 SPINAL STENOSIS OF LUMBAR REGION, UNSPECIFIED WHETHER NEUROGENIC CLAUDICATION PRESENT: ICD-10-CM

## 2019-11-26 DIAGNOSIS — M16.12 PRIMARY OSTEOARTHRITIS OF LEFT HIP: ICD-10-CM

## 2019-11-26 DIAGNOSIS — Z12.5 ENCOUNTER FOR SCREENING FOR MALIGNANT NEOPLASM OF PROSTATE: ICD-10-CM

## 2019-11-26 DIAGNOSIS — E78.41 ELEVATED LIPOPROTEIN(A): ICD-10-CM

## 2019-11-26 DIAGNOSIS — Z00.00 MEDICARE ANNUAL WELLNESS VISIT, SUBSEQUENT: ICD-10-CM

## 2019-11-26 DIAGNOSIS — I10 ESSENTIAL HYPERTENSION: ICD-10-CM

## 2019-11-26 DIAGNOSIS — M16.12 PRIMARY OSTEOARTHRITIS OF LEFT HIP: Primary | ICD-10-CM

## 2019-11-26 LAB
CHOLEST SERPL-MCNC: 191 MG/DL (ref 0–200)
HDLC SERPL-MCNC: 49 MG/DL (ref 40–60)
LDLC SERPL CALC-MCNC: 125 MG/DL (ref 0–100)
LDLC/HDLC SERPL: 2.54 {RATIO}
PSA SERPL-MCNC: 1.08 NG/ML (ref 0–4)
TRIGL SERPL-MCNC: 87 MG/DL (ref 0–150)
VLDLC SERPL-MCNC: 17.4 MG/DL (ref 5–40)

## 2019-11-26 PROCEDURE — G0439 PPPS, SUBSEQ VISIT: HCPCS | Performed by: INTERNAL MEDICINE

## 2019-11-26 PROCEDURE — G0103 PSA SCREENING: HCPCS | Performed by: INTERNAL MEDICINE

## 2019-11-26 PROCEDURE — 80061 LIPID PANEL: CPT | Performed by: INTERNAL MEDICINE

## 2019-11-26 PROCEDURE — 73502 X-RAY EXAM HIP UNI 2-3 VIEWS: CPT

## 2019-11-26 PROCEDURE — 73560 X-RAY EXAM OF KNEE 1 OR 2: CPT

## 2019-11-26 PROCEDURE — 20610 DRAIN/INJ JOINT/BURSA W/O US: CPT | Performed by: INTERNAL MEDICINE

## 2019-11-26 RX ORDER — METHYLPREDNISOLONE ACETATE 80 MG/ML
80 INJECTION, SUSPENSION INTRA-ARTICULAR; INTRALESIONAL; INTRAMUSCULAR; SOFT TISSUE ONCE
Status: DISCONTINUED | OUTPATIENT
Start: 2019-11-26 | End: 2019-12-27

## 2019-11-26 RX ORDER — LOSARTAN POTASSIUM AND HYDROCHLOROTHIAZIDE 25; 100 MG/1; MG/1
1 TABLET ORAL DAILY
Qty: 90 TABLET | Refills: 3 | Status: SHIPPED | OUTPATIENT
Start: 2019-11-26 | End: 2020-11-04 | Stop reason: SDUPTHER

## 2019-11-26 RX ORDER — GABAPENTIN 300 MG/1
300 CAPSULE ORAL 3 TIMES DAILY
Qty: 270 CAPSULE | Refills: 2 | Status: SHIPPED | OUTPATIENT
Start: 2019-11-26 | End: 2019-11-26

## 2019-11-26 RX ORDER — GABAPENTIN 300 MG/1
300 CAPSULE ORAL 3 TIMES DAILY
Qty: 270 CAPSULE | Refills: 2 | Status: SHIPPED | OUTPATIENT
Start: 2019-11-26 | End: 2020-05-28

## 2019-11-26 RX ORDER — CELECOXIB 200 MG/1
200 CAPSULE ORAL 2 TIMES DAILY
Qty: 180 CAPSULE | Refills: 3 | Status: SHIPPED | OUTPATIENT
Start: 2019-11-26 | End: 2020-12-02 | Stop reason: SDUPTHER

## 2019-11-26 NOTE — PROGRESS NOTES
Rooming Tab(CC,VS,Pt Hx,Fall Screen)  Chief Complaint   Patient presents with   • Follow-up     spine surgery       Subjective   Pt here for ongoing back, hip and knee issues- after surgery was good for 4-6 weeks only- at that time could walk 12K steps- now severe pain- radicular pain with pain/numbness to toes.  The pain is the worst when first awakes- very stiff hip- but then the radicular pain worsens as day goes. Frustrated that can't do normal ADL's - on 900mg of gabapentin/day    started PT as well.    still with good support- no signs of depression- needs medicare wellness and labs as well. NO signs of depression. Fell 2 in last year from back. Discussed all recommendations.    had flu vaccine already. shingrix  I have reviewed and updated his medications, medical history and problem list during today's office visit.     Patient Care Team:  Mei Kimball MD as PCP - General (Internal Medicine)    Problem List Tab  Medications Tab  Synopsis Tab  Chart Review Tab  Care Everywhere Tab  Immunizations Tab  Patient History Tab    Social History     Tobacco Use   • Smoking status: Former Smoker     Types: Cigarettes     Last attempt to quit: 7/10/1977     Years since quittin.4   • Smokeless tobacco: Never Used   Substance Use Topics   • Alcohol use: Yes     Alcohol/week: 0.6 oz     Types: 1 Cans of beer per week     Frequency: Never       Review of Systems   Constitutional: Negative for fatigue.   HENT: Negative for congestion and ear pain.    Respiratory: Negative for cough.    Cardiovascular: Negative for chest pain.   Gastrointestinal: Negative for constipation and GERD.   Genitourinary: Negative for decreased urine volume.   Musculoskeletal: Positive for arthralgias and gait problem.   Neurological: Negative for syncope and weakness.   Psychiatric/Behavioral: Negative for depressed mood.       Objective     Rooming Tab(CC,VS,Pt Hx,Fall Screen)  /78   Pulse 76   Temp 98.3 °F (36.8 °C) (Oral)    Resp 8   Wt 101 kg (223 lb)   BMI 31.10 kg/m²     Body mass index is 31.1 kg/m².    Physical Exam   Neck: Normal range of motion. Neck supple.   Cardiovascular: Normal rate and regular rhythm.   No murmur heard.  Pulmonary/Chest: Effort normal and breath sounds normal. No respiratory distress.   Abdominal: Soft. Bowel sounds are normal. He exhibits no distension.   Musculoskeletal:    Very tender left trocanteric- lower back tenderness     Neurological: A sensory deficit is present.   decreased sensation left calf   decreased muscle mass   Psychiatric: He has a normal mood and affect.   Nursing note and vitals reviewed.       Statin Choice Calculator  Data Reviewed:    Xr Spine Lumbar Ap & Lateral    Result Date: 11/6/2019  Impression: 1.Stable appearance of postoperative changes in the lower lumbar spine. 2.Multilevel moderate to severe degenerative changes. 3.Rightward curvature of the lumbar spine.  Electronically Signed By-Alia Liao On:11/6/2019 3:43 PM This report was finalized on [id number] by  Alia Liao, .    Procedure Note:   Procedure performed:Right greater trochanter injection left    The risks (including but not limited pain, bleeding and infection) and benefits of the procedure  were discussed with patient. Questions were sought and answered and informed consent was given for the procedure.     The procedure site was prepped and draped in standard bedside fashion for the procedure as indicated. The skin and deeper tissue was anesthetized with 1 cc's of Bupivacaine 0.25% without epinephrine. A 22 guage needle was utilized for the procedure and it was performed without complications.  The joint was injected with 1 cc's of depomedrol    The patient tolerated the procedure well and my repeat post-procedure exam was unremarkable for any problems related to the procedure    Instructions were given to contact us for any problems related to the procedure.                Assessment/Plan   Order  Review Tab  Health Maintenance Tab  Patient Plan/Order Tab  Diagnoses and all orders for this visit:    1. Primary osteoarthritis of left hip (Primary)  -     XR Hip With or Without Pelvis 2 - 3 View Left; Future  -     XR Knee 1 or 2 View Left; Future  -     methylPREDNISolone acetate (DEPO-medrol) injection 80 mg    2. Essential hypertension  -     PSA Screen    3. Elevated lipoprotein(a)  -     Lipid Panel    4. Encounter for screening for malignant neoplasm of prostate   -     PSA Screen    5. Spinal stenosis of lumbar region, unspecified whether neurogenic claudication present    6. Medicare annual wellness visit, subsequent  Assessment & Plan:  Discussed allrecommendations      Other orders  -     Discontinue: gabapentin (NEURONTIN) 300 MG capsule; Take 1 capsule by mouth 3 (Three) Times a Day.  Dispense: 270 capsule; Refill: 2  -     losartan-hydrochlorothiazide (HYZAAR) 100-25 MG per tablet; Take 1 tablet by mouth Daily.  Dispense: 90 tablet; Refill: 3  -     gabapentin (NEURONTIN) 300 MG capsule; Take 1 capsule by mouth 3 (Three) Times a Day.  Dispense: 270 capsule; Refill: 2  -     celecoxib (CeleBREX) 200 MG capsule; Take 1 capsule by mouth 2 (Two) Times a Day. Stop 7-22-19 for surgery  Dispense: 180 capsule; Refill: 3      Wrapup Tab  Return in about 3 months (around 2/26/2020) for Recheck.       They were informed of the diagnosis and treatment plan and directed to f/u for any further problems or concerns.      During this visit for their annual exam, we reviewed their personal history, social history and family history.  We went over their medications and all the recommended health maintenence items for their age group. They were given the opportunity to ask questions and discuss other concerns.

## 2019-11-27 ENCOUNTER — TREATMENT (OUTPATIENT)
Dept: PHYSICAL THERAPY | Facility: CLINIC | Age: 69
End: 2019-11-27

## 2019-11-27 DIAGNOSIS — M48.061 SPINAL STENOSIS, LUMBAR REGION, WITHOUT NEUROGENIC CLAUDICATION: Primary | ICD-10-CM

## 2019-11-27 PROCEDURE — 97140 MANUAL THERAPY 1/> REGIONS: CPT | Performed by: PHYSICAL THERAPIST

## 2019-11-27 PROCEDURE — 97110 THERAPEUTIC EXERCISES: CPT | Performed by: PHYSICAL THERAPIST

## 2019-11-27 NOTE — PROGRESS NOTES
Please call the patient regarding his abnormal result. Please call pt to let him know that left hip has severe arthritis to point that may need replacement- would see ortho of his choice to discuss his options-  I think a lot of pain is this in addition to the back.  Right hip has moderate  Arthritis and the more he favors the left, the worse the right will get.

## 2019-11-27 NOTE — PROGRESS NOTES
Physical Therapy Daily Progress Note    VISIT#: 2    Subjective   Daren Zambrano reports: Says he is doing okay, about the same overall, limping a little bit more today.       Objective     See Exercise, Manual, and Modality Logs for complete treatment.       Assessment & Plan     Assessment  Assessment details: Fair tolerance to session, no significant change with manual therapy. Continues to demonstrate significant left hip tightness.     Plan  Plan details: Lumbar sidelying decompression over pillow & manual therapy, left hip mobilizations, left hip stretching, hooklying core and hip ex (PPT, hip abd, LTR).  May consider dry needling.        Progress per Plan of Care        Timed:         Manual Therapy:    15     mins  79254;     Therapeutic Exercise:    15     mins  12814;     Neuromuscular Cameron:        mins  41697;    Therapeutic Activity:          mins  76071;     Gait Training:           mins  13471;     Ultrasound:          mins  33498;    Ionto:                                   mins   78952  Self Care:                            mins   57397    Un-Timed:  Electrical Stimulation:         mins  36067 ( );  Dry Needling          mins self-pay  Traction          mins 52911  Re-Eval                               mins  49501    Timed Treatment:   30   mins   Total Treatment:     30   mins    Rochelle Prasad, PT  Physical Therapist

## 2019-12-02 ENCOUNTER — TREATMENT (OUTPATIENT)
Dept: PHYSICAL THERAPY | Facility: CLINIC | Age: 69
End: 2019-12-02

## 2019-12-02 DIAGNOSIS — M48.061 SPINAL STENOSIS, LUMBAR REGION, WITHOUT NEUROGENIC CLAUDICATION: Primary | ICD-10-CM

## 2019-12-02 PROCEDURE — 97140 MANUAL THERAPY 1/> REGIONS: CPT | Performed by: PHYSICAL THERAPIST

## 2019-12-02 PROCEDURE — 97110 THERAPEUTIC EXERCISES: CPT | Performed by: PHYSICAL THERAPIST

## 2019-12-02 NOTE — PROGRESS NOTES
Physical Therapy Daily Progress Note    VISIT#: 3    Subjective   Daren Zambrano reports: Doing okay, the day after last session was really bad but doing better now.      Objective     See Exercise, Manual, and Modality Logs for complete treatment.     Patient Education: progressed HEP.     Assessment & Plan     Assessment  Assessment details: Good response to session, intermittent hip pain but no increase in symptoms by end of session.     Plan  Plan details: Lumbar sidelying decompression over pillow & manual therapy, left hip mobilizations, left hip stretching, hooklying core and hip ex (PPT, hip abd, LTR).  May consider dry needling.            Progress per Plan of Care            Timed:         Manual Therapy:    15     mins  62732;     Therapeutic Exercise:    15     mins  95501;     Neuromuscular Cameron:        mins  86686;    Therapeutic Activity:          mins  67683;     Gait Training:           mins  79922;     Ultrasound:          mins  59257;    Ionto:                                   mins   93676  Self Care:                            mins   32582    Un-Timed:  Electrical Stimulation:         mins  26245 ( );  Dry Needling          mins self-pay  Traction          mins 92937  Re-Eval                               mins  37598    Timed Treatment:   30   mins   Total Treatment:     30   mins    Rochelle Prasad, PT  Physical Therapist

## 2019-12-26 ENCOUNTER — TRANSITIONAL CARE MANAGEMENT TELEPHONE ENCOUNTER (OUTPATIENT)
Dept: FAMILY MEDICINE CLINIC | Facility: CLINIC | Age: 69
End: 2019-12-26

## 2019-12-27 ENCOUNTER — APPOINTMENT (OUTPATIENT)
Dept: CARDIOLOGY | Facility: HOSPITAL | Age: 69
End: 2019-12-27

## 2019-12-27 ENCOUNTER — HOSPITAL ENCOUNTER (EMERGENCY)
Facility: HOSPITAL | Age: 69
Discharge: HOME | End: 2019-12-27
Attending: EMERGENCY MEDICINE | Admitting: EMERGENCY MEDICINE

## 2019-12-27 VITALS
SYSTOLIC BLOOD PRESSURE: 145 MMHG | OXYGEN SATURATION: 99 % | RESPIRATION RATE: 18 BRPM | TEMPERATURE: 98.7 F | WEIGHT: 227.29 LBS | DIASTOLIC BLOOD PRESSURE: 74 MMHG | HEART RATE: 76 BPM | BODY MASS INDEX: 30.79 KG/M2 | HEIGHT: 72 IN

## 2019-12-27 DIAGNOSIS — M96.840 POSTOPERATIVE HEMATOMA OF MUSCULOSKELETAL STRUCTURE FOLLOWING MUSCULOSKELETAL PROCEDURE: Primary | ICD-10-CM

## 2019-12-27 LAB
BASOPHILS # BLD AUTO: 0 10*3/MM3 (ref 0–0.2)
BASOPHILS NFR BLD AUTO: 0.5 % (ref 0–1.5)
BH CV LOWER VASCULAR LEFT COMMON FEMORAL AUGMENT: NORMAL
BH CV LOWER VASCULAR LEFT COMMON FEMORAL COMPETENT: NORMAL
BH CV LOWER VASCULAR LEFT COMMON FEMORAL COMPRESS: NORMAL
BH CV LOWER VASCULAR LEFT COMMON FEMORAL PHASIC: NORMAL
BH CV LOWER VASCULAR LEFT COMMON FEMORAL SPONT: NORMAL
BH CV LOWER VASCULAR LEFT DISTAL FEMORAL COMPRESS: NORMAL
BH CV LOWER VASCULAR LEFT GASTRONEMIUS COMPRESS: NORMAL
BH CV LOWER VASCULAR LEFT GREATER SAPH AK COMPRESS: NORMAL
BH CV LOWER VASCULAR LEFT GREATER SAPH BK COMPRESS: NORMAL
BH CV LOWER VASCULAR LEFT LESSER SAPH COMPRESS: NORMAL
BH CV LOWER VASCULAR LEFT MID FEMORAL AUGMENT: NORMAL
BH CV LOWER VASCULAR LEFT MID FEMORAL COMPETENT: NORMAL
BH CV LOWER VASCULAR LEFT MID FEMORAL COMPRESS: NORMAL
BH CV LOWER VASCULAR LEFT MID FEMORAL PHASIC: NORMAL
BH CV LOWER VASCULAR LEFT MID FEMORAL SPONT: NORMAL
BH CV LOWER VASCULAR LEFT PERONEAL COMPRESS: NORMAL
BH CV LOWER VASCULAR LEFT POPLITEAL AUGMENT: NORMAL
BH CV LOWER VASCULAR LEFT POPLITEAL COMPETENT: NORMAL
BH CV LOWER VASCULAR LEFT POPLITEAL COMPRESS: NORMAL
BH CV LOWER VASCULAR LEFT POPLITEAL PHASIC: NORMAL
BH CV LOWER VASCULAR LEFT POPLITEAL SPONT: NORMAL
BH CV LOWER VASCULAR LEFT POSTERIOR TIBIAL COMPRESS: NORMAL
BH CV LOWER VASCULAR LEFT PROXIMAL FEMORAL COMPRESS: NORMAL
BH CV LOWER VASCULAR LEFT SAPHENOFEMORAL JUNCTION AUGMENT: NORMAL
BH CV LOWER VASCULAR LEFT SAPHENOFEMORAL JUNCTION COMPETENT: NORMAL
BH CV LOWER VASCULAR LEFT SAPHENOFEMORAL JUNCTION COMPRESS: NORMAL
BH CV LOWER VASCULAR LEFT SAPHENOFEMORAL JUNCTION PHASIC: NORMAL
BH CV LOWER VASCULAR LEFT SAPHENOFEMORAL JUNCTION SPONT: NORMAL
BH CV LOWER VASCULAR RIGHT COMMON FEMORAL AUGMENT: NORMAL
BH CV LOWER VASCULAR RIGHT COMMON FEMORAL COMPETENT: NORMAL
BH CV LOWER VASCULAR RIGHT COMMON FEMORAL COMPRESS: NORMAL
BH CV LOWER VASCULAR RIGHT COMMON FEMORAL PHASIC: NORMAL
BH CV LOWER VASCULAR RIGHT COMMON FEMORAL SPONT: NORMAL
DEPRECATED RDW RBC AUTO: 46.8 FL (ref 37–54)
EOSINOPHIL # BLD AUTO: 0.2 10*3/MM3 (ref 0–0.4)
EOSINOPHIL NFR BLD AUTO: 2.2 % (ref 0.3–6.2)
ERYTHROCYTE [DISTWIDTH] IN BLOOD BY AUTOMATED COUNT: 14.5 % (ref 12.3–15.4)
HCT VFR BLD AUTO: 37.7 % (ref 37.5–51)
HGB BLD-MCNC: 12.7 G/DL (ref 13–17.7)
LYMPHOCYTES # BLD AUTO: 1.7 10*3/MM3 (ref 0.7–3.1)
LYMPHOCYTES NFR BLD AUTO: 21.8 % (ref 19.6–45.3)
MCH RBC QN AUTO: 31.2 PG (ref 26.6–33)
MCHC RBC AUTO-ENTMCNC: 33.7 G/DL (ref 31.5–35.7)
MCV RBC AUTO: 92.5 FL (ref 79–97)
MONOCYTES # BLD AUTO: 0.6 10*3/MM3 (ref 0.1–0.9)
MONOCYTES NFR BLD AUTO: 7.6 % (ref 5–12)
NEUTROPHILS # BLD AUTO: 5.2 10*3/MM3 (ref 1.7–7)
NEUTROPHILS NFR BLD AUTO: 67.9 % (ref 42.7–76)
NRBC BLD AUTO-RTO: 0 /100 WBC (ref 0–0.2)
PLATELET # BLD AUTO: 162 10*3/MM3 (ref 140–450)
PMV BLD AUTO: 9.6 FL (ref 6–12)
RBC # BLD AUTO: 4.08 10*6/MM3 (ref 4.14–5.8)
WBC NRBC COR # BLD: 7.6 10*3/MM3 (ref 3.4–10.8)

## 2019-12-27 PROCEDURE — 36415 COLL VENOUS BLD VENIPUNCTURE: CPT

## 2019-12-27 PROCEDURE — 85025 COMPLETE CBC W/AUTO DIFF WBC: CPT | Performed by: EMERGENCY MEDICINE

## 2019-12-27 PROCEDURE — 99283 EMERGENCY DEPT VISIT LOW MDM: CPT

## 2019-12-27 PROCEDURE — 93971 EXTREMITY STUDY: CPT

## 2020-01-03 ENCOUNTER — TELEPHONE (OUTPATIENT)
Dept: FAMILY MEDICINE CLINIC | Facility: CLINIC | Age: 70
End: 2020-01-03

## 2020-01-03 ENCOUNTER — OFFICE VISIT (OUTPATIENT)
Dept: FAMILY MEDICINE CLINIC | Facility: CLINIC | Age: 70
End: 2020-01-03

## 2020-01-03 VITALS
SYSTOLIC BLOOD PRESSURE: 124 MMHG | DIASTOLIC BLOOD PRESSURE: 74 MMHG | OXYGEN SATURATION: 98 % | WEIGHT: 221.4 LBS | BODY MASS INDEX: 30.45 KG/M2 | HEART RATE: 57 BPM | TEMPERATURE: 98.2 F | RESPIRATION RATE: 16 BRPM

## 2020-01-03 DIAGNOSIS — M16.12 PRIMARY OSTEOARTHRITIS OF LEFT HIP: ICD-10-CM

## 2020-01-03 DIAGNOSIS — IMO0001 TRANSITION OF CARE PERFORMED WITH SHARING OF CLINICAL SUMMARY: Primary | ICD-10-CM

## 2020-01-03 PROCEDURE — 99495 TRANSJ CARE MGMT MOD F2F 14D: CPT | Performed by: INTERNAL MEDICINE

## 2020-01-03 NOTE — PROGRESS NOTES
Rooming Tab(CC,VS,Pt Hx,Fall Screen)  Chief Complaint   Patient presents with   • Transitional Care Management       Subjective   Pt her for TCM for left hip replacement-  Did as outpt Yerresamides-  On . Was on pain meds until -  Now just advil or tylenol if needed but not taking.  Not sleeping good as awakes with pain if rolls over- then will get back mila   Had signficant swelling 4 days after surgery that went down to  Calf- was worried about blood clot so went to ED- and  It was negative.    taking 1 stool softenor a day- having some BM's   I have reviewed and updated his medications, medical history and problem list during today's office visit.     Patient Care Team:  Mei Kimball MD as PCP - General (Internal Medicine)  Mei Kimball MD as PCP - Claims Attributed    Problem List Tab  Medications Tab  Synopsis Tab  Chart Review Tab  Care Everywhere Tab  Immunizations Tab  Patient History Tab    Social History     Tobacco Use   • Smoking status: Former Smoker     Types: Cigarettes     Last attempt to quit: 7/10/1977     Years since quittin.5   • Smokeless tobacco: Never Used   Substance Use Topics   • Alcohol use: Yes     Alcohol/week: 1.0 standard drinks     Types: 1 Cans of beer per week     Frequency: Never       Review of Systems   Constitutional: Negative for fatigue.   Cardiovascular: Negative for chest pain.   Musculoskeletal: Positive for arthralgias.       Objective     Rooming Tab(CC,VS,Pt Hx,Fall Screen)  /74 (BP Location: Left arm, Patient Position: Sitting, Cuff Size: Adult)   Pulse 57   Temp 98.2 °F (36.8 °C) (Oral)   Resp 16   Wt 100 kg (221 lb 6.4 oz)   SpO2 98%   BMI 30.45 kg/m²     Body mass index is 30.45 kg/m².    Physical Exam   Constitutional: He is oriented to person, place, and time. He appears well-developed and well-nourished.   HENT:   Head: Normocephalic and atraumatic.   Right Ear: Tympanic membrane normal.   Left Ear: Tympanic membrane  normal.   Eyes: Pupils are equal, round, and reactive to light.   Neck: Normal range of motion. Neck supple.   Cardiovascular: Normal rate and regular rhythm.   No murmur heard.  Pulmonary/Chest: Effort normal and breath sounds normal.   Abdominal: Soft. Bowel sounds are normal. He exhibits no distension.   Musculoskeletal: He exhibits deformity. He exhibits no tenderness.   Neurological: He is oriented to person, place, and time.   Skin: Capillary refill takes less than 2 seconds.   Incision healing, bruising and swelling   Nursing note and vitals reviewed.       Statin Choice Calculator  Data Reviewed:               Lab Results   Component Value Date    GLU 92 12/16/2019    BUN 29 (H) 12/16/2019    CREATININE 0.9 12/16/2019     12/16/2019    K 4.1 12/16/2019     12/16/2019    CALCIUM 10.0 12/16/2019    ALBUMIN 4.5 12/16/2019    BILITOT 0.9 12/16/2019    ALKPHOS 84 12/16/2019    AST 32 12/16/2019    ALT 39 12/16/2019    TRIG 87 11/26/2019    HDL 49 11/26/2019    VLDL 17.4 11/26/2019     (H) 11/26/2019    LDLHDL 2.54 11/26/2019    WBC 7.60 12/27/2019    WBC 6.79 12/16/2019    RBC 4.08 (L) 12/27/2019    RBC 5.13 12/16/2019    HCT 37.7 12/27/2019    HCT 47.2 12/16/2019    MCV 92.5 12/27/2019    MCV 92.0 12/16/2019    MCH 31.2 12/27/2019    MCH 31.0 12/16/2019    PSA 1.080 11/26/2019      Assessment/Plan   Order Review Tab  Health Maintenance Tab  Patient Plan/Order Tab  Diagnoses and all orders for this visit:    1. Transition of care performed with sharing of clinical summary (Primary)  Comments:  doing well with narcotic usage,  BM's much better- mobility excellent for < 2 weeks. in PT    2. Primary osteoarthritis of left hip  Comments:  DJD- s/p replacement        Wrapup Tab  Return if symptoms worsen or fail to improve.         Transitional Care Management Certification  I certify that the following are true:  1. Communication was made within 2 business days of discharge.  2. Complexity of  Medical Decision Making is moderate.  3. Face to face visit occurred within 14 days.    *Note: 83055 is for high complexity patients with a face to face visit within 7 days of discharge.  81725 is for high complexity patients with a face to face on days 8-14 post discharge or medium complexity with face to face visit within 14 days post discharge.

## 2020-03-17 ENCOUNTER — DOCUMENTATION (OUTPATIENT)
Dept: PHYSICAL THERAPY | Facility: CLINIC | Age: 70
End: 2020-03-17

## 2020-03-17 NOTE — PROGRESS NOTES
Discharge Summary  Discharge Summary from Physical/Occupational Therapy Report    Patient: Daren Zambrano   : 1950  Today's Date: 3/17/2020    Patient seen for 3 visits.    Discharge Status of Patient: No progress made with therapy - was actually flaring up symptoms. Decided to hold on therapy and pt to follow up with orthopedic surgeon.    Goals: Not Met    Discharge Plan: Patient to return to referring/providing physician      SIGNATURE: Rochelle Prasad PT

## 2020-05-28 ENCOUNTER — OFFICE VISIT (OUTPATIENT)
Dept: FAMILY MEDICINE CLINIC | Facility: CLINIC | Age: 70
End: 2020-05-28

## 2020-05-28 VITALS
SYSTOLIC BLOOD PRESSURE: 110 MMHG | HEART RATE: 55 BPM | BODY MASS INDEX: 28.42 KG/M2 | TEMPERATURE: 97.3 F | WEIGHT: 203 LBS | RESPIRATION RATE: 8 BRPM | HEIGHT: 71 IN | OXYGEN SATURATION: 98 % | DIASTOLIC BLOOD PRESSURE: 70 MMHG

## 2020-05-28 DIAGNOSIS — M48.061 SPINAL STENOSIS OF LUMBAR REGION, UNSPECIFIED WHETHER NEUROGENIC CLAUDICATION PRESENT: ICD-10-CM

## 2020-05-28 DIAGNOSIS — M16.12 PRIMARY OSTEOARTHRITIS OF LEFT HIP: Primary | ICD-10-CM

## 2020-05-28 DIAGNOSIS — I10 ESSENTIAL HYPERTENSION: ICD-10-CM

## 2020-05-28 LAB
ALBUMIN SERPL-MCNC: 4.6 G/DL (ref 3.5–5.2)
ALBUMIN/GLOB SERPL: 1.8 G/DL
ALP SERPL-CCNC: 69 U/L (ref 39–117)
ALT SERPL W P-5'-P-CCNC: 27 U/L (ref 1–41)
ANION GAP SERPL CALCULATED.3IONS-SCNC: 12.1 MMOL/L (ref 5–15)
AST SERPL-CCNC: 27 U/L (ref 1–40)
BASOPHILS # BLD AUTO: 0.04 10*3/MM3 (ref 0–0.2)
BASOPHILS NFR BLD AUTO: 0.9 % (ref 0–1.5)
BILIRUB SERPL-MCNC: 0.7 MG/DL (ref 0.2–1.2)
BUN BLD-MCNC: 23 MG/DL (ref 8–23)
BUN/CREAT SERPL: 27.1 (ref 7–25)
CALCIUM SPEC-SCNC: 9.8 MG/DL (ref 8.6–10.5)
CHLORIDE SERPL-SCNC: 100 MMOL/L (ref 98–107)
CO2 SERPL-SCNC: 25.9 MMOL/L (ref 22–29)
CREAT BLD-MCNC: 0.85 MG/DL (ref 0.76–1.27)
DEPRECATED RDW RBC AUTO: 47 FL (ref 37–54)
EOSINOPHIL # BLD AUTO: 0.3 10*3/MM3 (ref 0–0.4)
EOSINOPHIL NFR BLD AUTO: 6.5 % (ref 0.3–6.2)
ERYTHROCYTE [DISTWIDTH] IN BLOOD BY AUTOMATED COUNT: 13.5 % (ref 12.3–15.4)
GFR SERPL CREATININE-BSD FRML MDRD: 89 ML/MIN/1.73
GLOBULIN UR ELPH-MCNC: 2.6 GM/DL
GLUCOSE BLD-MCNC: 105 MG/DL (ref 65–99)
HCT VFR BLD AUTO: 43.3 % (ref 37.5–51)
HGB BLD-MCNC: 14.7 G/DL (ref 13–17.7)
LYMPHOCYTES # BLD AUTO: 1.57 10*3/MM3 (ref 0.7–3.1)
LYMPHOCYTES NFR BLD AUTO: 34 % (ref 19.6–45.3)
MCH RBC QN AUTO: 31.8 PG (ref 26.6–33)
MCHC RBC AUTO-ENTMCNC: 33.9 G/DL (ref 31.5–35.7)
MCV RBC AUTO: 93.7 FL (ref 79–97)
MONOCYTES # BLD AUTO: 0.45 10*3/MM3 (ref 0.1–0.9)
MONOCYTES NFR BLD AUTO: 9.7 % (ref 5–12)
NEUTROPHILS # BLD AUTO: 2.25 10*3/MM3 (ref 1.7–7)
NEUTROPHILS NFR BLD AUTO: 48.7 % (ref 42.7–76)
PLATELET # BLD AUTO: 158 10*3/MM3 (ref 140–450)
PMV BLD AUTO: 13.2 FL (ref 6–12)
POTASSIUM BLD-SCNC: 3.9 MMOL/L (ref 3.5–5.2)
PROT SERPL-MCNC: 7.2 G/DL (ref 6–8.5)
RBC # BLD AUTO: 4.62 10*6/MM3 (ref 4.14–5.8)
SODIUM BLD-SCNC: 138 MMOL/L (ref 136–145)
WBC NRBC COR # BLD: 4.62 10*3/MM3 (ref 3.4–10.8)

## 2020-05-28 PROCEDURE — 99213 OFFICE O/P EST LOW 20 MIN: CPT | Performed by: INTERNAL MEDICINE

## 2020-05-28 PROCEDURE — 80053 COMPREHEN METABOLIC PANEL: CPT | Performed by: INTERNAL MEDICINE

## 2020-05-28 PROCEDURE — 85025 COMPLETE CBC W/AUTO DIFF WBC: CPT | Performed by: INTERNAL MEDICINE

## 2020-05-28 NOTE — PROGRESS NOTES
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 Sometimes lifts more than he should- tries to do 4 hours straight only.  No GERD, no black stools. No bruising. No gums bleeding.     I have reviewed and updated his medications, medical history and problem list during today's office visit.     Patient Care Team:  Mei Kimball MD as PCP - General (Internal Medicine)  Mei Kimball MD as PCP - Claims Attributed    Problem List Tab  Medications Tab  Synopsis Tab  Chart Review Tab  Care Everywhere Tab  Immunizations Tab  Patient History Tab    Social History     Tobacco Use   • Smoking status: Former Smoker     Types: Cigarettes     Last attempt to quit: 7/10/1977     Years since quittin.9   • Smokeless tobacco: Never Used   Substance Use Topics   • Alcohol use: Yes     Alcohol/week: 1.0 standard drinks     Types: 1 Cans of beer per week     Frequency: Never       Review of Systems   Constitutional: Negative for fatigue and fever.   HENT: Negative for congestion.    Respiratory: Negative for apnea, cough and wheezing.    Cardiovascular: Negative for chest pain.   Gastrointestinal: Negative for abdominal distention.   Musculoskeletal: Positive for arthralgias and myalgias.   Neurological: Negative for syncope.   Psychiatric/Behavioral: Negative for behavioral problems.       Objective     Rooming Tab(CC,VS,Pt Hx,Fall Screen)  /70   Pulse 55   Temp 97.3 °F (36.3 °C) (Temporal)   Resp 8   Ht 180.3 cm (71\")   Wt 92.1 kg (203 lb)   SpO2 98%   BMI 28.31 kg/m²     Body mass index is 28.31 kg/m².    Physical Exam   Constitutional: He is oriented to person, place, and time. He appears well-developed and well-nourished.   HENT:   Head: Normocephalic and " atraumatic.   Right Ear: Tympanic membrane normal.   Left Ear: Tympanic membrane normal.   Eyes: Pupils are equal, round, and reactive to light.   Neck: Normal range of motion. Neck supple.   Cardiovascular: Normal rate and regular rhythm.   No murmur heard.  Pulmonary/Chest: Effort normal and breath sounds normal.   Abdominal: Soft. Bowel sounds are normal. He exhibits no distension.   Neurological: He is oriented to person, place, and time.   Skin: Capillary refill takes less than 2 seconds.   Psychiatric: He has a normal mood and affect.   Nursing note and vitals reviewed.       Statin Choice Calculator  Data Reviewed:                   Assessment/Plan   Order Review Tab  Health Maintenance Tab  Patient Plan/Order Tab  Diagnoses and all orders for this visit:    1. Primary osteoarthritis of left hip (Primary)  -     Comprehensive Metabolic Panel    2. Spinal stenosis of lumbar region, unspecified whether neurogenic claudication present    3. Essential hypertension  -     CBC & Differential        Wrapup Tab  Return in about 6 months (around 11/28/2020), or if symptoms worsen or fail to improve, for Medicare Wellness.       They were informed of the diagnosis and treatment plan and directed to f/u for any further problems or concerns.

## 2020-11-04 NOTE — TELEPHONE ENCOUNTER
Caller: Daren Zambrano    Relationship: Self    Best call back number: [phone]     Medication needed:   Requested Prescriptions     Pending Prescriptions Disp Refills   • losartan-hydrochlorothiazide (Hyzaar) 100-25 MG per tablet 90 tablet 3     Sig: Take 1 tablet by mouth Daily.       When do you need the refill by: 11/24/2020    What details did the patient provide when requesting the medication: PATIENT STATES HE WILL RUN OUT OF MEDICATION RIGHT BEFORE HIS APPT IN December AND WANTED TO MAKE SURE HE CAN GET REFILL SO HE DOES NOT MISS DAYS.    Does the patient have less than a 3 day supply:  [] Yes  [x] No    What is the patient's preferred pharmacy:      [facility] - [facility] [address] - [phone] [facility] [phone]

## 2020-11-05 RX ORDER — LOSARTAN POTASSIUM AND HYDROCHLOROTHIAZIDE 25; 100 MG/1; MG/1
1 TABLET ORAL DAILY
Qty: 90 TABLET | Refills: 3 | Status: SHIPPED | OUTPATIENT
Start: 2020-11-05 | End: 2021-10-04

## 2020-12-02 ENCOUNTER — OFFICE VISIT (OUTPATIENT)
Dept: FAMILY MEDICINE CLINIC | Facility: CLINIC | Age: 70
End: 2020-12-02

## 2020-12-02 ENCOUNTER — LAB (OUTPATIENT)
Dept: FAMILY MEDICINE CLINIC | Facility: CLINIC | Age: 70
End: 2020-12-02

## 2020-12-02 VITALS
TEMPERATURE: 96.9 F | OXYGEN SATURATION: 99 % | BODY MASS INDEX: 27.97 KG/M2 | HEART RATE: 69 BPM | DIASTOLIC BLOOD PRESSURE: 84 MMHG | WEIGHT: 199.8 LBS | HEIGHT: 71 IN | SYSTOLIC BLOOD PRESSURE: 134 MMHG | RESPIRATION RATE: 16 BRPM

## 2020-12-02 DIAGNOSIS — E55.9 VITAMIN D DEFICIENCY: ICD-10-CM

## 2020-12-02 DIAGNOSIS — Z00.00 MEDICARE ANNUAL WELLNESS VISIT, SUBSEQUENT: Primary | ICD-10-CM

## 2020-12-02 DIAGNOSIS — I10 ESSENTIAL HYPERTENSION: ICD-10-CM

## 2020-12-02 DIAGNOSIS — E78.41 ELEVATED LIPOPROTEIN(A): ICD-10-CM

## 2020-12-02 DIAGNOSIS — Z12.5 SCREENING FOR PROSTATE CANCER: ICD-10-CM

## 2020-12-02 LAB
25(OH)D3 SERPL-MCNC: 38.2 NG/ML (ref 30–100)
ALBUMIN SERPL-MCNC: 4.7 G/DL (ref 3.5–5.2)
ALBUMIN/GLOB SERPL: 1.6 G/DL
ALP SERPL-CCNC: 69 U/L (ref 39–117)
ALT SERPL W P-5'-P-CCNC: 31 U/L (ref 1–41)
ANION GAP SERPL CALCULATED.3IONS-SCNC: 12 MMOL/L (ref 5–15)
AST SERPL-CCNC: 29 U/L (ref 1–40)
BASOPHILS # BLD AUTO: 0.04 10*3/MM3 (ref 0–0.2)
BASOPHILS NFR BLD AUTO: 0.6 % (ref 0–1.5)
BILIRUB SERPL-MCNC: 0.6 MG/DL (ref 0–1.2)
BUN SERPL-MCNC: 26 MG/DL (ref 8–23)
BUN/CREAT SERPL: 28.9 (ref 7–25)
CALCIUM SPEC-SCNC: 10.1 MG/DL (ref 8.6–10.5)
CHLORIDE SERPL-SCNC: 100 MMOL/L (ref 98–107)
CHOLEST SERPL-MCNC: 187 MG/DL (ref 0–200)
CO2 SERPL-SCNC: 27 MMOL/L (ref 22–29)
CREAT SERPL-MCNC: 0.9 MG/DL (ref 0.76–1.27)
DEPRECATED RDW RBC AUTO: 43.3 FL (ref 37–54)
EOSINOPHIL # BLD AUTO: 0.26 10*3/MM3 (ref 0–0.4)
EOSINOPHIL NFR BLD AUTO: 3.7 % (ref 0.3–6.2)
ERYTHROCYTE [DISTWIDTH] IN BLOOD BY AUTOMATED COUNT: 12.6 % (ref 12.3–15.4)
GFR SERPL CREATININE-BSD FRML MDRD: 83 ML/MIN/1.73
GLOBULIN UR ELPH-MCNC: 3 GM/DL
GLUCOSE SERPL-MCNC: 98 MG/DL (ref 65–99)
HCT VFR BLD AUTO: 47 % (ref 37.5–51)
HDLC SERPL-MCNC: 56 MG/DL (ref 40–60)
HGB BLD-MCNC: 15.8 G/DL (ref 13–17.7)
IMM GRANULOCYTES # BLD AUTO: 0.02 10*3/MM3 (ref 0–0.05)
IMM GRANULOCYTES NFR BLD AUTO: 0.3 % (ref 0–0.5)
LDLC SERPL CALC-MCNC: 121 MG/DL (ref 0–100)
LDLC/HDLC SERPL: 2.15 {RATIO}
LYMPHOCYTES # BLD AUTO: 1.56 10*3/MM3 (ref 0.7–3.1)
LYMPHOCYTES NFR BLD AUTO: 22.2 % (ref 19.6–45.3)
MCH RBC QN AUTO: 31.9 PG (ref 26.6–33)
MCHC RBC AUTO-ENTMCNC: 33.6 G/DL (ref 31.5–35.7)
MCV RBC AUTO: 94.8 FL (ref 79–97)
MONOCYTES # BLD AUTO: 0.49 10*3/MM3 (ref 0.1–0.9)
MONOCYTES NFR BLD AUTO: 7 % (ref 5–12)
NEUTROPHILS NFR BLD AUTO: 4.66 10*3/MM3 (ref 1.7–7)
NEUTROPHILS NFR BLD AUTO: 66.2 % (ref 42.7–76)
NRBC BLD AUTO-RTO: 0 /100 WBC (ref 0–0.2)
PLATELET # BLD AUTO: 150 10*3/MM3 (ref 140–450)
PMV BLD AUTO: 12.6 FL (ref 6–12)
POTASSIUM SERPL-SCNC: 4.5 MMOL/L (ref 3.5–5.2)
PROT SERPL-MCNC: 7.7 G/DL (ref 6–8.5)
PSA SERPL-MCNC: 1.02 NG/ML (ref 0–4)
RBC # BLD AUTO: 4.96 10*6/MM3 (ref 4.14–5.8)
SODIUM SERPL-SCNC: 139 MMOL/L (ref 136–145)
TRIGL SERPL-MCNC: 52 MG/DL (ref 0–150)
TSH SERPL DL<=0.05 MIU/L-ACNC: 3.16 UIU/ML (ref 0.27–4.2)
VLDLC SERPL-MCNC: 10 MG/DL (ref 5–40)
WBC # BLD AUTO: 7.03 10*3/MM3 (ref 3.4–10.8)

## 2020-12-02 PROCEDURE — 84443 ASSAY THYROID STIM HORMONE: CPT | Performed by: INTERNAL MEDICINE

## 2020-12-02 PROCEDURE — 99213 OFFICE O/P EST LOW 20 MIN: CPT | Performed by: INTERNAL MEDICINE

## 2020-12-02 PROCEDURE — 80053 COMPREHEN METABOLIC PANEL: CPT | Performed by: INTERNAL MEDICINE

## 2020-12-02 PROCEDURE — 80061 LIPID PANEL: CPT | Performed by: INTERNAL MEDICINE

## 2020-12-02 PROCEDURE — G0439 PPPS, SUBSEQ VISIT: HCPCS | Performed by: INTERNAL MEDICINE

## 2020-12-02 PROCEDURE — 85025 COMPLETE CBC W/AUTO DIFF WBC: CPT | Performed by: INTERNAL MEDICINE

## 2020-12-02 PROCEDURE — 82306 VITAMIN D 25 HYDROXY: CPT | Performed by: INTERNAL MEDICINE

## 2020-12-02 PROCEDURE — G0103 PSA SCREENING: HCPCS | Performed by: INTERNAL MEDICINE

## 2020-12-02 RX ORDER — CELECOXIB 200 MG/1
200 CAPSULE ORAL 2 TIMES DAILY
Qty: 180 CAPSULE | Refills: 3 | Status: SHIPPED | OUTPATIENT
Start: 2020-12-02 | End: 2021-03-12 | Stop reason: SDUPTHER

## 2020-12-02 NOTE — PROGRESS NOTES
The ABCs of the Annual Wellness Visit  Subsequent Medicare Wellness Visit    Chief Complaint   Patient presents with   • Annual Exam     MCR wellness       Subjective   History of Present Illness:  Daren Zambrano is a 70 y.o. male who presents for a Subsequent Medicare Wellness Visit and other concerns.   Doing well- no COVID contacts,  Back in Shinto and  Bible study   sleeping well- urinates 1/night. Back and hip doing well. Back will get aggrevated 1-2/year and will rest and it resolves.   No hip pain-   no chest pain, no difficulty breathing. No moles changing   Mom in memory home has not been able to see her since March  Careful about stairs- holding on  [place]. Will get foot drop on right at times.        HEALTH RISK ASSESSMENT    Recent Hospitalizations:  No hospitalization(s) within the last year.    Current Medical Providers:  Patient Care Team:  Mei Kimball MD as PCP - General (Internal Medicine)    Smoking Status:  Social History     Tobacco Use   Smoking Status Former Smoker   • Types: Cigarettes   • Quit date: 7/10/1977   • Years since quittin.4   Smokeless Tobacco Never Used       Alcohol Consumption:  Social History     Substance and Sexual Activity   Alcohol Use Yes   • Alcohol/week: 1.0 standard drinks   • Types: 1 Cans of beer per week   • Frequency: Never       Depression Screen:   PHQ-2/PHQ-9 Depression Screening 2020   Little interest or pleasure in doing things 0   Feeling down, depressed, or hopeless 0   Total Score 0       Fall Risk Screen:  SARAH Fall Risk Assessment was completed, and patient is at MODERATE risk for falls. Assessment completed on:2020    Health Habits and Functional and Cognitive Screening:  Functional & Cognitive Status 2020   Do you have difficulty preparing food and eating? No   Do you have difficulty bathing yourself, getting dressed or grooming yourself? No   Do you have difficulty using the toilet? No   Do you have difficulty moving  around from place to place? No   Do you have trouble with steps or getting out of a bed or a chair? No   Current Diet Well Balanced Diet   Dental Exam Not up to date   Eye Exam Not up to date   Exercise (times per week) 7 times per week   Current Exercise Activities Include Yard Work   Do you need help using the phone?  No   Are you deaf or do you have serious difficulty hearing?  No   Do you need help with transportation? No   Do you need help shopping? No   Do you need help preparing meals?  No   Do you need help with housework?  No   Do you need help with laundry? No   Do you need help taking your medications? No   Do you need help managing money? No   Do you ever drive or ride in a car without wearing a seat belt? No   Have you felt unusual stress, anger or loneliness in the last month? No   Who do you live with? Spouse   If you need help, do you have trouble finding someone available to you? No   Have you been bothered in the last four weeks by sexual problems? No         Does the patient have evidence of cognitive impairment? No    Asprin use counseling:Start ASA 81 mg daily     Age-appropriate Screening Schedule:  Refer to the list below for future screening recommendations based on patient's age, sex and/or medical conditions. Orders for these recommended tests are listed in the plan section. The patient has been provided with a written plan.    Health Maintenance   Topic Date Due   • TDAP/TD VACCINES (2 - Td) 01/01/2021   • LIPID PANEL  12/02/2021   • COLONOSCOPY  02/21/2022   • INFLUENZA VACCINE  Completed   • ZOSTER VACCINE  Completed          The following portions of the patient's history were reviewed and updated as appropriate: current medications, past family history, past medical history, past social history, past surgical history and problem list.    Outpatient Medications Prior to Visit   Medication Sig Dispense Refill   • aspirin 81 MG EC tablet Take 81 mg by mouth Daily. Stop 7-22-19 for surgery      • losartan-hydrochlorothiazide (Hyzaar) 100-25 MG per tablet Take 1 tablet by mouth Daily. 90 tablet 3   • Multiple Vitamins-Minerals (MULTIVITAMIN WITH MINERALS) tablet tablet Take 1 tablet by mouth Daily. Stop 7-22-19 for surgery     • Omega-3 Fatty Acids (FISH OIL OMEGA-3 PO) Take 1 tablet by mouth Daily. 1000/300mg.  Stop 7-22-19 for surgery     • vitamin E 100 UNIT capsule Take 400 Units by mouth Daily. Stop 7-22-19 for surgery     • celecoxib (CeleBREX) 200 MG capsule Take 1 capsule by mouth 2 (Two) Times a Day. Stop 7-22-19 for surgery 180 capsule 3     No facility-administered medications prior to visit.        Patient Active Problem List   Diagnosis   • Spinal stenosis   • Osteoarthritis   • Sciatica   • Transition of care performed with sharing of clinical summary   • Primary osteoarthritis of left hip   • Medicare annual wellness visit, subsequent   • Hypertension   • Encounter for general adult medical examination without abnormal findings   • Synovial cyst of popliteal space   • Primary osteoarthritis of left hip   • Vitamin D deficiency   • Elevated lipoprotein(a)   • Screening for prostate cancer       Advanced Care Planning:  ACP discussion was held with the patient during this visit. Patient has an advance directive in EMR which is still valid.  Patient has an advance directive (not in EMR), copy requested.    Review of Systems   Constitutional: Negative for activity change and fatigue.   HENT: Negative for congestion.    Respiratory: Negative for apnea and wheezing.    Cardiovascular: Negative for chest pain and palpitations.   Genitourinary: Negative for urgency.   Musculoskeletal: Positive for arthralgias.   Neurological: Negative for weakness and headaches.   Psychiatric/Behavioral: Negative for sleep disturbance.       Compared to one year ago, the patient feels his physical health is better.  Compared to one year ago, the patient feels his mental health is better.    Reviewed chart for  "potential of high risk medication in the elderly: yes  Reviewed chart for potential of harmful drug interactions in the elderly:yes    Objective         Vitals:    12/02/20 0942   BP: 134/84   BP Location: Left arm   Patient Position: Sitting   Cuff Size: Adult   Pulse: 69   Resp: 16   Temp: 96.9 °F (36.1 °C)   TempSrc: Skin   SpO2: 99%   Weight: 90.6 kg (199 lb 12.8 oz)   Height: 180.3 cm (71\")       Body mass index is 27.87 kg/m².  Discussed the patient's BMI with him. The BMI is in the acceptable range.    Physical Exam  Vitals signs and nursing note reviewed.   Constitutional:       Appearance: Normal appearance. He is well-developed.   HENT:      Head: Normocephalic and atraumatic.      Right Ear: Tympanic membrane normal.      Left Ear: Tympanic membrane normal.      Nose: No congestion.   Eyes:      Pupils: Pupils are equal, round, and reactive to light.   Neck:      Musculoskeletal: Normal range of motion and neck supple.   Cardiovascular:      Rate and Rhythm: Normal rate and regular rhythm.      Heart sounds: No murmur.   Pulmonary:      Effort: Pulmonary effort is normal.      Breath sounds: Normal breath sounds.   Abdominal:      General: Bowel sounds are normal. There is no distension.      Palpations: Abdomen is soft.   Musculoskeletal:         General: No tenderness.   Skin:     Capillary Refill: Capillary refill takes less than 2 seconds.      Coloration: Skin is not jaundiced.   Neurological:      General: No focal deficit present.      Mental Status: He is alert and oriented to person, place, and time.   Psychiatric:         Mood and Affect: Mood normal.         Behavior: Behavior normal.         Lab Results   Component Value Date    TRIG 52 12/02/2020    HDL 56 12/02/2020     (H) 12/02/2020    VLDL 10 12/02/2020        Assessment/Plan   Medicare Risks and Personalized Health Plan  CMS Preventative Services Quick Reference  Fall Risk    The above risks/problems have been discussed with the " patient.  Pertinent information has been shared with the patient in the After Visit Summary.  Follow up plans and orders are seen below in the Assessment/Plan Section.    Diagnoses and all orders for this visit:    1. Medicare annual wellness visit, subsequent (Primary)  Comments:  discussed all recommendations    2. Elevated lipoprotein(a)  -     Lipid Panel  -     Comprehensive Metabolic Panel    3. Essential hypertension  Comments:  stable- doing well   Orders:  -     TSH  -     CBC Auto Differential    4. Vitamin D deficiency  -     Vitamin D 25 Hydroxy    5. Screening for prostate cancer  -     PSA Screen    Other orders  -     celecoxib (CeleBREX) 200 MG capsule; Take 1 capsule by mouth 2 (Two) Times a Day. Stop 7-22-19 for surgery  Dispense: 180 capsule; Refill: 3      Follow Up:  Return in about 1 year (around 12/2/2021), or if symptoms worsen or fail to improve, for Medicare Wellness.     An After Visit Summary and PPPS were given to the patient.         During this visit for their annual exam, we reviewed their personal history, social history and family history.  We went over their medications and all the recommended health maintenence items for their age group. They were given the opportunity to ask questions and discuss other concerns.

## 2020-12-06 PROBLEM — Z12.5 SCREENING FOR PROSTATE CANCER: Status: ACTIVE | Noted: 2020-12-06

## 2020-12-06 PROBLEM — E55.9 VITAMIN D DEFICIENCY: Status: ACTIVE | Noted: 2020-12-06

## 2020-12-06 PROBLEM — E78.41 ELEVATED LIPOPROTEIN(A): Status: ACTIVE | Noted: 2020-12-06

## 2021-03-12 RX ORDER — CELECOXIB 200 MG/1
200 CAPSULE ORAL 2 TIMES DAILY
Qty: 180 CAPSULE | Refills: 3 | Status: SHIPPED | OUTPATIENT
Start: 2021-03-12 | End: 2022-03-30 | Stop reason: SDUPTHER

## 2021-03-12 NOTE — TELEPHONE ENCOUNTER
Caller: Daren Zambrano    Relationship: Self    Best call back number: [phone]     Medication needed:   Requested Prescriptions     Pending Prescriptions Disp Refills   • celecoxib (CeleBREX) 200 MG capsule 180 capsule 3     Sig: Take 1 capsule by mouth 2 (Two) Times a Day. Stop 7-22-19 for surgery       When do you need the refill by:TODAY    What details did the patient provide when requesting the medication:   90 DAY SUPPLY    Does the patient have less than a 3 day supply:  [] Yes  [x] No    What is the patient's preferred pharmacy: [facility] - [facility] [address] - [phone]  - [phone] FX

## 2021-05-03 ENCOUNTER — TELEPHONE (OUTPATIENT)
Dept: FAMILY MEDICINE CLINIC | Facility: CLINIC | Age: 71
End: 2021-05-03

## 2021-05-03 NOTE — TELEPHONE ENCOUNTER
Caller: Daren Zambrano    Relationship to patient: Self    Best call back number: [phone]    Chief complaint: BAD BACK FOR THREE WEEKS     Type of visit:OFFICE VISIT    Requested date: 5/3/21    If rescheduling, when is the original appointment: N/A    Additional notes:STATED HE HAS BEEN IN PAIN FOR THREE WEEKS WOULD LIKE TO COME IN FOR IT TO BE CHECKED OUT AND POSSIBLY TALK ABOUT ALTERNATIVE FOR PAIN RELIEF

## 2021-05-04 ENCOUNTER — OFFICE VISIT (OUTPATIENT)
Dept: FAMILY MEDICINE CLINIC | Facility: CLINIC | Age: 71
End: 2021-05-04

## 2021-05-04 VITALS
HEIGHT: 71 IN | OXYGEN SATURATION: 96 % | WEIGHT: 201.4 LBS | RESPIRATION RATE: 12 BRPM | TEMPERATURE: 96.9 F | BODY MASS INDEX: 28.19 KG/M2 | HEART RATE: 57 BPM | DIASTOLIC BLOOD PRESSURE: 76 MMHG | SYSTOLIC BLOOD PRESSURE: 125 MMHG

## 2021-05-04 DIAGNOSIS — M48.061 SPINAL STENOSIS OF LUMBAR REGION, UNSPECIFIED WHETHER NEUROGENIC CLAUDICATION PRESENT: Primary | ICD-10-CM

## 2021-05-04 PROCEDURE — 96372 THER/PROPH/DIAG INJ SC/IM: CPT | Performed by: INTERNAL MEDICINE

## 2021-05-04 PROCEDURE — 99213 OFFICE O/P EST LOW 20 MIN: CPT | Performed by: INTERNAL MEDICINE

## 2021-05-04 RX ORDER — METHYLPREDNISOLONE ACETATE 80 MG/ML
80 INJECTION, SUSPENSION INTRA-ARTICULAR; INTRALESIONAL; INTRAMUSCULAR; SOFT TISSUE ONCE
Status: COMPLETED | OUTPATIENT
Start: 2021-05-04 | End: 2021-05-04

## 2021-05-04 RX ADMIN — METHYLPREDNISOLONE ACETATE 80 MG: 80 INJECTION, SUSPENSION INTRA-ARTICULAR; INTRALESIONAL; INTRAMUSCULAR; SOFT TISSUE at 09:30

## 2021-05-19 ENCOUNTER — HOSPITAL ENCOUNTER (OUTPATIENT)
Dept: MRI IMAGING | Facility: HOSPITAL | Age: 71
Discharge: HOME | End: 2021-05-19
Admitting: INTERNAL MEDICINE

## 2021-05-19 DIAGNOSIS — M48.061 SPINAL STENOSIS OF LUMBAR REGION, UNSPECIFIED WHETHER NEUROGENIC CLAUDICATION PRESENT: ICD-10-CM

## 2021-05-19 PROCEDURE — 72148 MRI LUMBAR SPINE W/O DYE: CPT

## 2021-06-03 ENCOUNTER — LAB (OUTPATIENT)
Dept: FAMILY MEDICINE CLINIC | Facility: CLINIC | Age: 71
End: 2021-06-03

## 2021-06-03 ENCOUNTER — OFFICE VISIT (OUTPATIENT)
Dept: FAMILY MEDICINE CLINIC | Facility: CLINIC | Age: 71
End: 2021-06-03

## 2021-06-03 VITALS
WEIGHT: 195.4 LBS | OXYGEN SATURATION: 96 % | SYSTOLIC BLOOD PRESSURE: 151 MMHG | HEART RATE: 63 BPM | DIASTOLIC BLOOD PRESSURE: 75 MMHG | BODY MASS INDEX: 27.35 KG/M2 | HEIGHT: 71 IN | RESPIRATION RATE: 10 BRPM

## 2021-06-03 DIAGNOSIS — I10 ESSENTIAL HYPERTENSION: ICD-10-CM

## 2021-06-03 DIAGNOSIS — M16.12 PRIMARY OSTEOARTHRITIS OF LEFT HIP: ICD-10-CM

## 2021-06-03 DIAGNOSIS — I10 ESSENTIAL HYPERTENSION: Primary | ICD-10-CM

## 2021-06-03 PROCEDURE — 99214 OFFICE O/P EST MOD 30 MIN: CPT | Performed by: INTERNAL MEDICINE

## 2021-06-03 PROCEDURE — 36415 COLL VENOUS BLD VENIPUNCTURE: CPT | Performed by: INTERNAL MEDICINE

## 2021-06-03 PROCEDURE — 80053 COMPREHEN METABOLIC PANEL: CPT | Performed by: INTERNAL MEDICINE

## 2021-06-03 PROCEDURE — 85025 COMPLETE CBC W/AUTO DIFF WBC: CPT | Performed by: INTERNAL MEDICINE

## 2021-06-03 RX ORDER — AMLODIPINE BESYLATE 2.5 MG/1
2.5 TABLET ORAL DAILY
Qty: 90 TABLET | Refills: 0 | Status: SHIPPED | OUTPATIENT
Start: 2021-06-03 | End: 2021-07-21 | Stop reason: SDUPTHER

## 2021-06-03 RX ORDER — AMLODIPINE BESYLATE 2.5 MG/1
2.5 TABLET ORAL DAILY
Qty: 30 TABLET | Refills: 4 | Status: SHIPPED | OUTPATIENT
Start: 2021-06-03 | End: 2021-06-03

## 2021-06-03 NOTE — PROGRESS NOTES
"Rooming Tab(CC,VS,Pt Hx,Fall Screen)  Chief Complaint   Patient presents with   • Hypertension       Subjective   Back is doing better- the injection helped and rest. Did do 9 holes of golf and tolerated- started back on riding  and walking more.  [place]-    BP is up today  I have reviewed and updated his medications, medical history and problem list during today's office visit.     Patient Care Team:  Mei Kimball MD as PCP - General (Internal Medicine)    Problem List Tab  Medications Tab  Synopsis Tab  Chart Review Tab  Care Everywhere Tab  Immunizations Tab  Patient History Tab    Social History     Tobacco Use   • Smoking status: Former Smoker     Types: Cigarettes     Quit date: 7/10/1977     Years since quittin.9   • Smokeless tobacco: Never Used   Substance Use Topics   • Alcohol use: Yes     Alcohol/week: 1.0 standard drinks     Types: 1 Cans of beer per week       Review of Systems    Objective     Rooming Tab(CC,VS,Pt Hx,Fall Screen)  /75   Pulse 63   Resp 10   Ht 180.3 cm (71\")   Wt 88.6 kg (195 lb 6.4 oz)   SpO2 96%   BMI 27.25 kg/m²     Body mass index is 27.25 kg/m².    Physical Exam  Vitals and nursing note reviewed.   Constitutional:       Appearance: Normal appearance. He is well-developed.   HENT:      Head: Normocephalic and atraumatic.      Right Ear: Tympanic membrane normal.      Left Ear: Tympanic membrane normal.      Nose: No rhinorrhea.      Mouth/Throat:      Pharynx: No posterior oropharyngeal erythema.   Eyes:      Pupils: Pupils are equal, round, and reactive to light.   Cardiovascular:      Rate and Rhythm: Normal rate and regular rhythm.      Pulses: Normal pulses.      Heart sounds: Normal heart sounds. No murmur heard.     Pulmonary:      Effort: Pulmonary effort is normal.      Breath sounds: Normal breath sounds.   Abdominal:      General: Bowel sounds are normal. There is no distension.      Palpations: Abdomen is soft. "   Musculoskeletal:         General: No tenderness.      Cervical back: Normal range of motion and neck supple.   Skin:     Capillary Refill: Capillary refill takes less than 2 seconds.   Neurological:      Mental Status: He is alert and oriented to person, place, and time.   Psychiatric:         Mood and Affect: Mood normal.         Behavior: Behavior normal.          Statin Choice Calculator  Data Reviewed:         The data below has been reviewed by Mei Kimball MD on 06/03/2021.      Lab Results   Component Value Date    BUN 18 06/03/2021    CREATININE 0.78 06/03/2021    EGFRIFNONA 98 06/03/2021     06/03/2021    K 4.1 06/03/2021     06/03/2021    CALCIUM 9.9 06/03/2021    ALBUMIN 4.60 06/03/2021    BILITOT 0.7 06/03/2021    ALKPHOS 73 06/03/2021    AST 29 06/03/2021    ALT 28 06/03/2021    WBC 6.26 06/03/2021    RBC 5.17 06/03/2021    HCT 49.7 06/03/2021    MCV 96.1 06/03/2021    MCH 32.5 06/03/2021      Assessment/Plan   Order Review Tab  Health Maintenance Tab  Patient Plan/Order Tab  Diagnoses and all orders for this visit:    1. Essential hypertension (Primary)  Comments:  norvasc added  Orders:  -     Discontinue: amLODIPine (Norvasc) 2.5 MG tablet; Take 1 tablet by mouth Daily.  Dispense: 30 tablet; Refill: 4  -     CBC Auto Differential    2. Primary osteoarthritis of left hip  Comments:  continue nsaids  Orders:  -     Comprehensive Metabolic Panel        Wrapup Tab  Return if symptoms worsen or fail to improve.       They were informed of the diagnosis and treatment plan and directed to f/u for any further problems or concerns.         check labs- stay on  celebrex for now

## 2021-06-04 LAB
ALBUMIN SERPL-MCNC: 4.6 G/DL (ref 3.5–5.2)
ALBUMIN/GLOB SERPL: 1.6 G/DL
ALP SERPL-CCNC: 73 U/L (ref 39–117)
ALT SERPL W P-5'-P-CCNC: 28 U/L (ref 1–41)
ANION GAP SERPL CALCULATED.3IONS-SCNC: 11.5 MMOL/L (ref 5–15)
AST SERPL-CCNC: 29 U/L (ref 1–40)
BASOPHILS # BLD AUTO: 0.04 10*3/MM3 (ref 0–0.2)
BASOPHILS NFR BLD AUTO: 0.6 % (ref 0–1.5)
BILIRUB SERPL-MCNC: 0.7 MG/DL (ref 0–1.2)
BUN SERPL-MCNC: 18 MG/DL (ref 8–23)
BUN/CREAT SERPL: 23.1 (ref 7–25)
CALCIUM SPEC-SCNC: 9.9 MG/DL (ref 8.6–10.5)
CHLORIDE SERPL-SCNC: 100 MMOL/L (ref 98–107)
CO2 SERPL-SCNC: 28.5 MMOL/L (ref 22–29)
CREAT SERPL-MCNC: 0.78 MG/DL (ref 0.76–1.27)
DEPRECATED RDW RBC AUTO: 45 FL (ref 37–54)
EOSINOPHIL # BLD AUTO: 0.16 10*3/MM3 (ref 0–0.4)
EOSINOPHIL NFR BLD AUTO: 2.6 % (ref 0.3–6.2)
ERYTHROCYTE [DISTWIDTH] IN BLOOD BY AUTOMATED COUNT: 12.5 % (ref 12.3–15.4)
GFR SERPL CREATININE-BSD FRML MDRD: 98 ML/MIN/1.73
GLOBULIN UR ELPH-MCNC: 2.8 GM/DL
GLUCOSE SERPL-MCNC: 86 MG/DL (ref 65–99)
HCT VFR BLD AUTO: 49.7 % (ref 37.5–51)
HGB BLD-MCNC: 16.8 G/DL (ref 13–17.7)
IMM GRANULOCYTES # BLD AUTO: 0.02 10*3/MM3 (ref 0–0.05)
IMM GRANULOCYTES NFR BLD AUTO: 0.3 % (ref 0–0.5)
LYMPHOCYTES # BLD AUTO: 1.86 10*3/MM3 (ref 0.7–3.1)
LYMPHOCYTES NFR BLD AUTO: 29.7 % (ref 19.6–45.3)
MCH RBC QN AUTO: 32.5 PG (ref 26.6–33)
MCHC RBC AUTO-ENTMCNC: 33.8 G/DL (ref 31.5–35.7)
MCV RBC AUTO: 96.1 FL (ref 79–97)
MONOCYTES # BLD AUTO: 0.42 10*3/MM3 (ref 0.1–0.9)
MONOCYTES NFR BLD AUTO: 6.7 % (ref 5–12)
NEUTROPHILS NFR BLD AUTO: 3.76 10*3/MM3 (ref 1.7–7)
NEUTROPHILS NFR BLD AUTO: 60.1 % (ref 42.7–76)
NRBC BLD AUTO-RTO: 0 /100 WBC (ref 0–0.2)
PLATELET # BLD AUTO: 154 10*3/MM3 (ref 140–450)
PMV BLD AUTO: 12.5 FL (ref 6–12)
POTASSIUM SERPL-SCNC: 4.1 MMOL/L (ref 3.5–5.2)
PROT SERPL-MCNC: 7.4 G/DL (ref 6–8.5)
RBC # BLD AUTO: 5.17 10*6/MM3 (ref 4.14–5.8)
SODIUM SERPL-SCNC: 140 MMOL/L (ref 136–145)
WBC # BLD AUTO: 6.26 10*3/MM3 (ref 3.4–10.8)

## 2021-06-17 ENCOUNTER — TELEPHONE (OUTPATIENT)
Dept: FAMILY MEDICINE CLINIC | Facility: CLINIC | Age: 71
End: 2021-06-17

## 2021-06-17 NOTE — TELEPHONE ENCOUNTER
Caller: Mayur Zambrano    Relationship: Self    Best call back number: [phone]    What is the best time to reach you: ANYTIIME    Who are you requesting to speak with (clinical staff, provider,  specific staff member): CLINICAL STAFF    Do you know the name of the person who called: MAYUR ZAMBRANO    What was the call regarding: PATIENT WAS PRESCRIBED AMLODIPINE BESYLATE AND WAS TOLD TO LET DR. OROZCO KNOW HOW IT WAS WORKING FOR HIM, HAS BLOOD PRESSURE AVERAGES    HE SAID HE PLANS TO CONTINUE TAKING IT UNLESS HE HEARS OTHERWISE FROM DR. OROZCO     Do you require a callback: YES      THESE WERE TAKEN OVER THREE DAYS AND AVERAGED    AM AVERAGE 118/64, PULSE 51    PM AVERAGE 123/61, PULSE 61

## 2021-07-21 DIAGNOSIS — I10 ESSENTIAL HYPERTENSION: ICD-10-CM

## 2021-07-21 RX ORDER — AMLODIPINE BESYLATE 2.5 MG/1
2.5 TABLET ORAL DAILY
Qty: 90 TABLET | Refills: 3 | Status: SHIPPED | OUTPATIENT
Start: 2021-07-21 | End: 2022-01-11 | Stop reason: SDUPTHER

## 2021-07-21 RX ORDER — METHYLPREDNISOLONE 4 MG/1
TABLET ORAL
Qty: 1 EACH | Refills: 0 | Status: SHIPPED | OUTPATIENT
Start: 2021-07-21 | End: 2021-12-08

## 2021-07-21 NOTE — TELEPHONE ENCOUNTER
Caller: Daren Zambrano    Relationship: Self    Best call back number: [phone]  Medication needed:   Requested Prescriptions     Pending Prescriptions Disp Refills   • amLODIPine (NORVASC) 2.5 MG tablet 90 tablet 0     Sig: Take 1 tablet by mouth Daily.       When do you need the refill by: 1 week    What additional details did the patient provide when requesting the medication: NEEDS A 90 DAY SUPPLY. PATIENT WILL BE BE HEADING OUT WEST NEXT MONTH    Does the patient have less than a 3 day supply:  [] Yes  [x] No    What is the patient's preferred pharmacy: [facility] - [facility] [address] - [phone]  - [phone] FX

## 2021-07-21 NOTE — TELEPHONE ENCOUNTER
Caller: Daren Zambrano    Relationship: Self    Best call back number: [phone]    What medication are you requesting:STEROID FOR SPINAL STENOSIS    What are your current symptoms: WANTS TO HELP WITH HIKES ON HIS TRIP OUT WEST      If a prescription is needed, what is your preferred pharmacy and phone number: [facility] [facility] #[id number] - [place], [place]  [address] AT [facility] [address]  [address] - [phone]  - [phone] FX     Additional notes:

## 2021-10-04 RX ORDER — LOSARTAN POTASSIUM AND HYDROCHLOROTHIAZIDE 25; 100 MG/1; MG/1
TABLET ORAL
Qty: 90 TABLET | Refills: 3 | Status: SHIPPED | OUTPATIENT
Start: 2021-10-04 | End: 2022-10-04

## 2021-10-28 ENCOUNTER — TELEPHONE (OUTPATIENT)
Dept: FAMILY MEDICINE CLINIC | Facility: CLINIC | Age: 71
End: 2021-10-28

## 2021-10-28 NOTE — TELEPHONE ENCOUNTER
PATIENT WAS EXPOSED TO COVID ON Sunday DURING BIBLE STUDY. SOMEONE FROM HIS GROUP TESTED POSITIVE ON Monday. HE AND HIS WIFE ARE VACCINATED AND HAVE NO SYMPTOMS. ON Sunday (1 WEEK AFTER EXPOSURE) THEY ARE SUPPOSED TO FLY TO [place] TO VISIT A RELATIVE WHO HAS CANCER. THEY HAVE RAPID TESTS SCHEDULED THE DAY BEFORE (6 DAYS AFTER EXPOSURE). RAY WOULD LIKE DR OROZCO'S OPINION ON IF THE TESTS ARE NEGATIVE ARE THEY STILL PUTTING ILL RELATIVE AT RISK?

## 2021-10-29 ENCOUNTER — TELEPHONE (OUTPATIENT)
Dept: FAMILY MEDICINE CLINIC | Facility: CLINIC | Age: 71
End: 2021-10-29

## 2021-10-29 NOTE — TELEPHONE ENCOUNTER
Testing 5 days after exposure is what I am recommending- so your 6 day test is adequate. If the family member is on chemo and immunocompromised, I always recommend being in mask around that person. Not just COVID, but RSV and other viruses could be very bad for them.  Please apologize for not getting back with him yesterday

## 2021-10-29 NOTE — TELEPHONE ENCOUNTER
Caller: Daren Zambrano    Relationship: Self    Best call back number: [phone]    PATIENT CALLED TO INFORM OFFICE OF NEGATIVE COVID TEST RESULT

## 2021-12-08 ENCOUNTER — OFFICE VISIT (OUTPATIENT)
Dept: FAMILY MEDICINE CLINIC | Facility: CLINIC | Age: 71
End: 2021-12-08

## 2021-12-08 ENCOUNTER — LAB (OUTPATIENT)
Dept: FAMILY MEDICINE CLINIC | Facility: CLINIC | Age: 71
End: 2021-12-08

## 2021-12-08 VITALS
WEIGHT: 201 LBS | SYSTOLIC BLOOD PRESSURE: 120 MMHG | TEMPERATURE: 97.1 F | HEART RATE: 51 BPM | DIASTOLIC BLOOD PRESSURE: 82 MMHG | OXYGEN SATURATION: 98 % | HEIGHT: 71 IN | RESPIRATION RATE: 16 BRPM | BODY MASS INDEX: 28.14 KG/M2

## 2021-12-08 DIAGNOSIS — E55.9 VITAMIN D DEFICIENCY: ICD-10-CM

## 2021-12-08 DIAGNOSIS — M48.061 SPINAL STENOSIS OF LUMBAR REGION, UNSPECIFIED WHETHER NEUROGENIC CLAUDICATION PRESENT: ICD-10-CM

## 2021-12-08 DIAGNOSIS — Z12.5 SCREENING FOR PROSTATE CANCER: ICD-10-CM

## 2021-12-08 DIAGNOSIS — Z00.00 MEDICARE ANNUAL WELLNESS VISIT, SUBSEQUENT: Primary | ICD-10-CM

## 2021-12-08 DIAGNOSIS — E78.41 ELEVATED LIPOPROTEIN(A): ICD-10-CM

## 2021-12-08 DIAGNOSIS — C44.729 SQUAMOUS CELL CARCINOMA OF SKIN OF LEFT LOWER EXTREMITY: ICD-10-CM

## 2021-12-08 LAB
25(OH)D3 SERPL-MCNC: 40.7 NG/ML (ref 30–100)
ALBUMIN SERPL-MCNC: 4.6 G/DL (ref 3.5–5.2)
ALBUMIN/GLOB SERPL: 1.5 G/DL
ALP SERPL-CCNC: 72 U/L (ref 39–117)
ALT SERPL W P-5'-P-CCNC: 25 U/L (ref 1–41)
ANION GAP SERPL CALCULATED.3IONS-SCNC: 9.9 MMOL/L (ref 5–15)
AST SERPL-CCNC: 23 U/L (ref 1–40)
BASOPHILS # BLD AUTO: 0.05 10*3/MM3 (ref 0–0.2)
BASOPHILS NFR BLD AUTO: 0.8 % (ref 0–1.5)
BILIRUB SERPL-MCNC: 0.8 MG/DL (ref 0–1.2)
BUN SERPL-MCNC: 22 MG/DL (ref 8–23)
BUN/CREAT SERPL: 24.7 (ref 7–25)
CALCIUM SPEC-SCNC: 10 MG/DL (ref 8.6–10.5)
CHLORIDE SERPL-SCNC: 100 MMOL/L (ref 98–107)
CHOLEST SERPL-MCNC: 201 MG/DL (ref 0–200)
CO2 SERPL-SCNC: 28.1 MMOL/L (ref 22–29)
CREAT SERPL-MCNC: 0.89 MG/DL (ref 0.76–1.27)
DEPRECATED RDW RBC AUTO: 42.4 FL (ref 37–54)
EOSINOPHIL # BLD AUTO: 0.16 10*3/MM3 (ref 0–0.4)
EOSINOPHIL NFR BLD AUTO: 2.7 % (ref 0.3–6.2)
ERYTHROCYTE [DISTWIDTH] IN BLOOD BY AUTOMATED COUNT: 12.3 % (ref 12.3–15.4)
GFR SERPL CREATININE-BSD FRML MDRD: 84 ML/MIN/1.73
GLOBULIN UR ELPH-MCNC: 3 GM/DL
GLUCOSE SERPL-MCNC: 85 MG/DL (ref 65–99)
HCT VFR BLD AUTO: 46.7 % (ref 37.5–51)
HDLC SERPL-MCNC: 57 MG/DL (ref 40–60)
HGB BLD-MCNC: 16 G/DL (ref 13–17.7)
IMM GRANULOCYTES # BLD AUTO: 0.02 10*3/MM3 (ref 0–0.05)
IMM GRANULOCYTES NFR BLD AUTO: 0.3 % (ref 0–0.5)
LDLC SERPL CALC-MCNC: 129 MG/DL (ref 0–100)
LDLC/HDLC SERPL: 2.24 {RATIO}
LYMPHOCYTES # BLD AUTO: 1.26 10*3/MM3 (ref 0.7–3.1)
LYMPHOCYTES NFR BLD AUTO: 21.4 % (ref 19.6–45.3)
MCH RBC QN AUTO: 32.5 PG (ref 26.6–33)
MCHC RBC AUTO-ENTMCNC: 34.3 G/DL (ref 31.5–35.7)
MCV RBC AUTO: 94.7 FL (ref 79–97)
MONOCYTES # BLD AUTO: 0.47 10*3/MM3 (ref 0.1–0.9)
MONOCYTES NFR BLD AUTO: 8 % (ref 5–12)
NEUTROPHILS NFR BLD AUTO: 3.94 10*3/MM3 (ref 1.7–7)
NEUTROPHILS NFR BLD AUTO: 66.8 % (ref 42.7–76)
NRBC BLD AUTO-RTO: 0 /100 WBC (ref 0–0.2)
PLATELET # BLD AUTO: 145 10*3/MM3 (ref 140–450)
PMV BLD AUTO: 12.5 FL (ref 6–12)
POTASSIUM SERPL-SCNC: 3.9 MMOL/L (ref 3.5–5.2)
PROT SERPL-MCNC: 7.6 G/DL (ref 6–8.5)
PSA SERPL-MCNC: 1.1 NG/ML (ref 0–4)
RBC # BLD AUTO: 4.93 10*6/MM3 (ref 4.14–5.8)
SODIUM SERPL-SCNC: 138 MMOL/L (ref 136–145)
TRIGL SERPL-MCNC: 83 MG/DL (ref 0–150)
VLDLC SERPL-MCNC: 15 MG/DL (ref 5–40)
WBC NRBC COR # BLD: 5.9 10*3/MM3 (ref 3.4–10.8)

## 2021-12-08 PROCEDURE — 85025 COMPLETE CBC W/AUTO DIFF WBC: CPT | Performed by: INTERNAL MEDICINE

## 2021-12-08 PROCEDURE — 80053 COMPREHEN METABOLIC PANEL: CPT | Performed by: INTERNAL MEDICINE

## 2021-12-08 PROCEDURE — 1170F FXNL STATUS ASSESSED: CPT | Performed by: INTERNAL MEDICINE

## 2021-12-08 PROCEDURE — 1160F RVW MEDS BY RX/DR IN RCRD: CPT | Performed by: INTERNAL MEDICINE

## 2021-12-08 PROCEDURE — 82306 VITAMIN D 25 HYDROXY: CPT | Performed by: INTERNAL MEDICINE

## 2021-12-08 PROCEDURE — 36415 COLL VENOUS BLD VENIPUNCTURE: CPT | Performed by: INTERNAL MEDICINE

## 2021-12-08 PROCEDURE — G0439 PPPS, SUBSEQ VISIT: HCPCS | Performed by: INTERNAL MEDICINE

## 2021-12-08 PROCEDURE — 90732 PPSV23 VACC 2 YRS+ SUBQ/IM: CPT | Performed by: INTERNAL MEDICINE

## 2021-12-08 PROCEDURE — 99213 OFFICE O/P EST LOW 20 MIN: CPT | Performed by: INTERNAL MEDICINE

## 2021-12-08 PROCEDURE — 80061 LIPID PANEL: CPT | Performed by: INTERNAL MEDICINE

## 2021-12-08 PROCEDURE — G0009 ADMIN PNEUMOCOCCAL VACCINE: HCPCS | Performed by: INTERNAL MEDICINE

## 2021-12-08 PROCEDURE — G0103 PSA SCREENING: HCPCS | Performed by: INTERNAL MEDICINE

## 2021-12-08 NOTE — PROGRESS NOTES
The ABCs of the Annual Wellness Visit  Subsequent Medicare Wellness Visit    Chief Complaint   Patient presents with   • Medicare Wellness-subsequent      Subjective    History of Present Illness:  Daren Zambrano is a 71 y.o. male who presents for a Subsequent Medicare Wellness Visit and other concerns. Back and joints doing ok- was able to get the trip in. Still walking 2.5 miles a day using a walking stick ahs good shoes   no chest pain    left posterior calf with lesion for 6-12  The following portions of the patient's history were reviewed and   updated as appropriate: allergies, current medications, past family history, past medical history, past social history, past surgical history and problem list.    Compared to one year ago, the patient feels his physical   health is the same.    Compared to one year ago, the patient feels his mental   health is the same.    Recent Hospitalizations:  He was not admitted to the hospital during the last year.       Current Medical Providers:  Patient Care Team:  Mei Kimball MD as PCP - General (Internal Medicine)    Outpatient Medications Prior to Visit   Medication Sig Dispense Refill   • amLODIPine (NORVASC) 2.5 MG tablet Take 1 tablet by mouth Daily. 90 tablet 3   • aspirin 81 MG EC tablet Take 81 mg by mouth Daily. Stop 7-22-19 for surgery     • celecoxib (CeleBREX) 200 MG capsule Take 1 capsule by mouth 2 (Two) Times a Day. Stop 7-22-19 for surgery 180 capsule 3   • losartan-hydrochlorothiazide (HYZAAR) 100-25 MG per tablet TAKE 1 TABLET EVERY DAY 90 tablet 3   • Multiple Vitamins-Minerals (MULTIVITAMIN WITH MINERALS) tablet tablet Take 1 tablet by mouth Daily. Stop 7-22-19 for surgery     • Omega-3 Fatty Acids (FISH OIL OMEGA-3 PO) Take 1 tablet by mouth Daily. 1000/300mg.  Stop 7-22-19 for surgery     • vitamin E 100 UNIT capsule Take 400 Units by mouth Daily. Stop 7-22-19 for surgery     • methylPREDNISolone (MEDROL) 4 MG dose pack Take as directed on  "package instructions. 1 each 0     No facility-administered medications prior to visit.       No opioid medication identified on active medication list. I have reviewed chart for other potential  high risk medication/s and harmful drug interactions in the elderly.          Aspirin is on active medication list. Aspirin use is indicated based on review of current medical condition/s. Pros and cons of this therapy have been discussed today. Benefits of this medication outweigh potential harm.  Patient has been encouraged to continue taking this medication.  .      Patient Active Problem List   Diagnosis   • Spinal stenosis   • Osteoarthritis   • Sciatica   • Transition of care performed with sharing of clinical summary   • Primary osteoarthritis of left hip   • Medicare annual wellness visit, subsequent   • Hypertension   • Encounter for general adult medical examination without abnormal findings   • Synovial cyst of popliteal space   • Primary osteoarthritis of left hip   • Vitamin D deficiency   • Elevated lipoprotein(a)   • Screening for prostate cancer   • Squamous cell carcinoma of skin of left lower extremity     Advance Care Planning  Advance Directive is not on file.  ACP discussion was held with the patient during this visit. Patient does not have an advance directive, declines further assistance.          Objective    Vitals:    12/08/21 0932   BP: 120/82   BP Location: Right arm   Patient Position: Sitting   Cuff Size: Adult   Pulse: 51   Resp: 16   Temp: 97.1 °F (36.2 °C)   TempSrc: Temporal   SpO2: 98%   Weight: 91.2 kg (201 lb)   Height: 180.3 cm (71\")     BMI Readings from Last 1 Encounters:   12/08/21 28.03 kg/m²   BMI is above normal parameters. Recommendations include: exercise counseling    Does the patient have evidence of cognitive impairment? No    Physical Exam  Vitals and nursing note reviewed.   Constitutional:       Appearance: Normal appearance. He is well-developed.   HENT:      Head: " Normocephalic and atraumatic.      Right Ear: Tympanic membrane normal.      Left Ear: Tympanic membrane normal.      Nose: No rhinorrhea.      Mouth/Throat:      Pharynx: No posterior oropharyngeal erythema.   Eyes:      Pupils: Pupils are equal, round, and reactive to light.   Cardiovascular:      Rate and Rhythm: Normal rate and regular rhythm.      Pulses: Normal pulses.      Heart sounds: Normal heart sounds. No murmur heard.      Pulmonary:      Effort: Pulmonary effort is normal.      Breath sounds: Normal breath sounds.   Abdominal:      General: Bowel sounds are normal. There is no distension.      Palpations: Abdomen is soft.   Musculoskeletal:         General: Tenderness present.      Cervical back: Normal range of motion and neck supple.   Skin:     Capillary Refill: Capillary refill takes less than 2 seconds.      Findings: No erythema.   Neurological:      Mental Status: He is alert and oriented to person, place, and time.      Sensory: Sensory deficit present.      Gait: Gait normal.   Psychiatric:         Mood and Affect: Mood normal.         Behavior: Behavior normal.       Lab Results   Component Value Date    TRIG 83 2021    HDL 57 2021     (H) 2021    VLDL 15 2021            HEALTH RISK ASSESSMENT    Smoking Status:  Social History     Tobacco Use   Smoking Status Former Smoker   • Types: Cigarettes   • Quit date: 7/10/1977   • Years since quittin.4   Smokeless Tobacco Never Used     Alcohol Consumption:  Social History     Substance and Sexual Activity   Alcohol Use Yes   • Alcohol/week: 1.0 standard drink   • Types: 1 Cans of beer per week     Fall Risk Screen:    SARAH Fall Risk Assessment was completed, and patient is at LOW risk for falls.Assessment completed on:6/3/2021    Depression Screening:  PHQ-2/PHQ-9 Depression Screening 2021   Little interest or pleasure in doing things 0   Feeling down, depressed, or hopeless 0   Total Score 0       Health  Habits and Functional and Cognitive Screening:  Functional & Cognitive Status 12/2/2020   Do you have difficulty preparing food and eating? No   Do you have difficulty bathing yourself, getting dressed or grooming yourself? No   Do you have difficulty using the toilet? No   Do you have difficulty moving around from place to place? No   Do you have trouble with steps or getting out of a bed or a chair? No   Current Diet Well Balanced Diet   Dental Exam Not up to date   Eye Exam Not up to date   Exercise (times per week) 7 times per week   Current Exercise Activities Include Yard Work   Do you need help using the phone?  No   Are you deaf or do you have serious difficulty hearing?  No   Do you need help with transportation? No   Do you need help shopping? No   Do you need help preparing meals?  No   Do you need help with housework?  No   Do you need help with laundry? No   Do you need help taking your medications? No   Do you need help managing money? No   Do you ever drive or ride in a car without wearing a seat belt? No   Have you felt unusual stress, anger or loneliness in the last month? No   Who do you live with? Spouse   If you need help, do you have trouble finding someone available to you? No   Have you been bothered in the last four weeks by sexual problems? No       Age-appropriate Screening Schedule:  Refer to the list below for future screening recommendations based on patient's age, sex and/or medical conditions. Orders for these recommended tests are listed in the plan section. The patient has been provided with a written plan.    Health Maintenance   Topic Date Due   • TDAP/TD VACCINES (2 - Td or Tdap) 12/08/2022 (Originally 1/1/2021)   • LIPID PANEL  12/08/2022   • INFLUENZA VACCINE  Completed   • ZOSTER VACCINE  Completed              Assessment/Plan   CMS Preventative Services Quick Reference  Risk Factors Identified During Encounter  Cardiovascular Disease  Depression/Dysphoria  Fall Risk-High or  Moderate  The above risks/problems have been discussed with the patient.  Follow up actions/plans if indicated are seen below in the Assessment/Plan Section.  Pertinent information has been shared with the patient in the After Visit Summary.    Diagnoses and all orders for this visit:    1. Medicare annual wellness visit, subsequent (Primary)  Comments:   discussed all recomendations  Orders:  -     Comprehensive Metabolic Panel  -     CBC Auto Differential    2. Squamous cell carcinoma of skin of left lower extremity  Comments:  left calfwith abrnomal lesion worrisome for SCC- will send to Derm  Orders:  -     Ambulatory Referral to Dermatology    3. Elevated lipoprotein(a)  -     Lipid panel; Future    4. Vitamin D deficiency  -     Vitamin D 25 Hydroxy    5. Screening for prostate cancer  -     PSA Screen    6. Spinal stenosis of lumbar region, unspecified whether neurogenic claudication present  Comments:  continue wtih celebrex BID, will do injections if worsens    Other orders  -     Pneumococcal Polysaccharide Vaccine 23-Valent (PPSV23) Greater Than or Equal To 1yo Subcutaneous / IM        Follow Up:   Return in about 6 months (around 6/8/2022), or if symptoms worsen or fail to improve.     An After Visit Summary and PPPS were made available to the patient.         During this visit for their annual exam, we reviewed their personal history, social history and family history.  We went over their medications and all the recommended health maintenence items for their age group. They were given the opportunity to ask questions and discuss other concerns.

## 2022-01-11 DIAGNOSIS — I10 ESSENTIAL HYPERTENSION: ICD-10-CM

## 2022-01-11 RX ORDER — AMLODIPINE BESYLATE 2.5 MG/1
2.5 TABLET ORAL DAILY
Qty: 90 TABLET | Refills: 3 | Status: SHIPPED | OUTPATIENT
Start: 2022-01-11 | End: 2022-12-14 | Stop reason: SDUPTHER

## 2022-03-30 ENCOUNTER — TELEPHONE (OUTPATIENT)
Dept: FAMILY MEDICINE CLINIC | Facility: CLINIC | Age: 72
End: 2022-03-30

## 2022-03-30 DIAGNOSIS — M48.061 SPINAL STENOSIS OF LUMBAR REGION, UNSPECIFIED WHETHER NEUROGENIC CLAUDICATION PRESENT: Primary | ICD-10-CM

## 2022-03-30 RX ORDER — CELECOXIB 200 MG/1
200 CAPSULE ORAL 2 TIMES DAILY
Qty: 180 CAPSULE | Refills: 3 | Status: SHIPPED | OUTPATIENT
Start: 2022-03-30 | End: 2023-03-14

## 2022-03-30 RX ORDER — METHYLPREDNISOLONE 4 MG/1
TABLET ORAL
Qty: 21 EACH | Refills: 0 | Status: SHIPPED | OUTPATIENT
Start: 2022-03-30 | End: 2022-04-11

## 2022-03-30 NOTE — TELEPHONE ENCOUNTER
Caller: Daren Zambrano    Relationship to patient: Self    Best call back number: [phone]    Patient is needing:     PATIENT SAID HE CAME BACK FROM A WALK RECENTLY AND COULD NOT BEAR WEIGHT ON HIS LEFT LEG     HE SAID THAT HE STAYED OFF IT FOR THE NIGHT AND SLEPT IN A CHAIR, THEN THE NEXT MORNING HE COULD WALK ON IT, BUT WAS STIFF IN HIS BACK    HE IS WANTING TO SEE IF DR. OROZCO WANTS HIM TO DO THE INJECTIONS IN HIS BACK THAT HE ONCE DID TO TREAT HIS SPINAL STENOSIS     HE IS WANTING TO SEE IF HE IS NEEDING TO COME IN AND SEE DR. OROZCO FIRST, OR IF HE CAN BE REFERRED DIRECTLY TO [facility] PAIN [facility]NAGEMENT FOR THE INJECTIONS    HE SAID HE WILL NOT BE AVAILABLE FOR ANY APPOINTMENTS FROM 04/30 - 05-8

## 2022-03-30 NOTE — TELEPHONE ENCOUNTER
Incoming Refill Request      Medication requested (name and dose):     celecoxib (CeleBREX) 200 MG capsule  200 mg, 2 Times Daily         Pharmacy where request should be sent: [facility] - [place] [phone] - [phone]  - [phone]   [phone]    Additional details provided by patient: NONE    Best call back number: [phone]    Does the patient have less than a 3 day supply:  [] Yes  [] No    Luc Randall Rep  03/30/22, 09:18 EDT

## 2022-03-30 NOTE — TELEPHONE ENCOUNTER
I sent in steroid pills to help acutely with the inflammation- but then have sent in referral for injections again also

## 2022-04-06 NOTE — PROGRESS NOTES
CHIEF COMPLAINT  Lower back pain      Subjective   Daren Zambrano is a 72 y.o. male who was referred by Mei Altamirano MD  to our pain management clinic for consultation, evaluation and treatment of lower back pain.  His initial pain started in 2010 and had epidurals with variant degrees of success. His pain progressed to the point where he was barely able to walk and  s/p surgeries as described in PMH in 2019. He used to ride bicycle for 25 miles before pain started. Walks about 2.5 miles/day - slow walk. He would like to get back to his normal activities which he is not able to do due to significant pain.     Lower back pain is 5/10 on VAS, at maximum is 8/10. Pain is aching, deep, sharp, tingling, numbness, spasms in nature. Pain is referred left sided buttock pain and as numbness in left lateral thigh, L calf. The pain is constant. The pain is improved by celebrex. The pain is worse with standing, bending forward or increased activities. Chronic weakness with R plantarflexion as per patient. He uses cane for balance.     PHQ-9- 1  SOAPP-0     PMH:   Partial laminectomy of L3-5 with Coflex implant (b/w/ L3-4; L4-5), L4-S1 decompression, left side discectomy by Dr. Mckinney -2019; Hypertension, synovial cyst of popliteal space, SCC of skin LLE, s/p L hip replacement     Current Medications:   Celebrex 200 mg  Tizanidine 4 mg qhs PRN.     Past Medications:  Gabapentin- stopped as neuropathic pain had resolved.     Past Modalities:  TENS:       no          Physical Therapy Within The Last 6 Months     No (2019- didn't have relief).   Psychotherapy     no  Massage Therapy      no    Patient Complains Of:  Uro-Fecal Incontinence no  Weight Gain/Loss  no  Fever/Chills   no  Weakness   Yes (uses cane for balance issues).       PEG Assessment   What number best describes your pain on average in the past week?6  What number best describes how, during the past week, pain has interfered with your enjoyment of  life?6  What number best describes how, during the past week, pain has interfered with your general activity?  6        Current Outpatient Medications:   •  amLODIPine (NORVASC) 2.5 MG tablet, Take 1 tablet by mouth Daily., Disp: 90 tablet, Rfl: 3  •  aspirin 81 MG EC tablet, Take 81 mg by mouth Daily. Stop 7-22-19 for surgery, Disp: , Rfl:   •  celecoxib (CeleBREX) 200 MG capsule, Take 1 capsule by mouth 2 (Two) Times a Day. Stop 7-22-19 for surgery, Disp: 180 capsule, Rfl: 3  •  losartan-hydrochlorothiazide (HYZAAR) 100-25 MG per tablet, TAKE 1 TABLET EVERY DAY, Disp: 90 tablet, Rfl: 3  •  Multiple Vitamins-Minerals (MULTIVITAMIN WITH MINERALS) tablet tablet, Take 1 tablet by mouth Daily. Stop 7-22-19 for surgery, Disp: , Rfl:   •  Omega-3 Fatty Acids (FISH OIL OMEGA-3 PO), Take 1 tablet by mouth Daily. 1000/300mg.  Stop 7-22-19 for surgery, Disp: , Rfl:   •  vitamin E 100 UNIT capsule, Take 400 Units by mouth Daily. Stop 7-22-19 for surgery, Disp: , Rfl:   •  tiZANidine (ZANAFLEX) 4 MG tablet, Take 1 tablet by mouth At Night As Needed for Muscle Spasms for up to 30 days., Disp: 30 tablet, Rfl: 0    The following portions of the patient's history were reviewed and updated as appropriate: allergies, current medications, past family history, past medical history, past social history, past surgical history, and problem list.      REVIEW OF PERTINENT MEDICAL DATA    Past Medical History:   Diagnosis Date   • Clavicle fracture    • Hypertension    • Injury of back     stenosis   • Low back pain    • Osteoarthritis    • Slow to wake up after anesthesia      Past Surgical History:   Procedure Laterality Date   • DUPUYTREN CONTRACTURE RELEASE     • FOOT SURGERY  1978    TUMOR REMOVED   • KNEE ARTHROSCOPY Right     2013 AND 2016   • LUMBAR LAMINECTOMY DISCECTOMY DECOMPRESSION Bilateral 7/29/2019    Procedure: PDC PARTIAL LAMINECTOMY OF L3-L5 WITH COFLEX IMPLANTATION, L5-S1 DECOMPRESSION LEFT SIDE DISCECTOMY;  Surgeon:  "Isabel Mckinney MD;  Location: [facility] MAIN OR;  Service: Orthopedic Spine   • RESECTION DISTAL CLAVICLE  2000     Family History   Problem Relation Age of Onset   • Dementia Mother    • Other Mother         knee replacement   • Cancer Father      Social History     Socioeconomic History   • Marital status:    Tobacco Use   • Smoking status: Former Smoker     Types: Cigarettes     Quit date: 7/10/1977     Years since quittin.7   • Smokeless tobacco: Never Used   Vaping Use   • Vaping Use: Never used   Substance and Sexual Activity   • Alcohol use: Yes     Alcohol/week: 1.0 standard drink     Types: 1 Cans of beer per week     Comment: Occ.   • Drug use: No   • Sexual activity: Yes         Review of Systems   Musculoskeletal: Positive for arthralgias, back pain and gait problem.         Vitals:    22 1018   BP: 124/62   Pulse: 54   Resp: 16   SpO2: 97%   Weight: 91.2 kg (201 lb)   Height: 180.3 cm (71\")   PainSc:   5         Objective   Physical Exam  Musculoskeletal:         General: Tenderness present.        Legs:    Neurological:      Deep Tendon Reflexes:      Reflex Scores:       Patellar reflexes are 1+ on the right side and 1+ on the left side.       Achilles reflexes are 1+ on the right side and 1+ on the left side.     Comments: Motor strength 5/5 b/l LE  Sensory intact b/l LE             Imaging Reviewed:  MRI lumbar spine-2021  -Postsurgical changes including Coflex placement at the posterior elements of L3-4 and L4-5, presumed to left L5-S1 laminectomy.    -Q8-3-[id number] on left side.  Moderate facet arthropathy.  Moderate to severe left neural foraminal stenosis which has progressed since last exam.  Mild right.  L2-3-severe left and moderate right facet arthropathy.  Moderate central canal stenosis.  Moderate left lateral recess and neural foraminal stenosis.  Mild right.  L3-4-Coflex device between the spinous processes.  Severe facet arthropathy.  Mild central canal " stenosis.  Moderate left neural foraminal stenosis.  Severe right neural foraminal stenosis appears to be impinging upon right L3 nerve root within the foramen, appears unchanged.  L4-5-Coflex device.  Severe left and moderate right facet arthropathy.  Mild central canal stenosis.  Severe right neural foraminal stenosis impinging on right L4 nerve root.  Similar to prior study.  Mild to moderate left neural foraminal narrowing.  L6-W0-[id number] left-sided laminectomy.  Moderate bilateral facet arthropathy.  Moderate to severe left neural foraminal stenosis and severe right neural foraminal stenosis.     Bilateral hip x-rays-11/2019  Severe left hip osteoarthritis  Moderate degenerative changes of right hip joint.    Assessment:    1. Spinal stenosis of lumbar region without neurogenic claudication    2. DDD (degenerative disc disease), lumbar    3. History of lumbar laminectomy for spinal cord decompression         Plan:   1. Defer UDS for now.   2. We discussed trying a course of formal physical therapy.  Physical therapy can help strengthen and stretch the muscles around the joints. Continue to be as active as possible. Start physical therapy as it will help generalized pain and follow up with HEP.   3. Start Tizanidine 4 mg qhs PRN. Common side effects discussed with the patient including but not limited to dry mouth, drowsiness, dizziness, lightheadedness, constipation, weakness and tiredness.   4. Will consider addition of Gabapentin in future.   5. Patient seems to have two different problems, pain in the lower back from spondylosis and referred pain in the leg from stenosis.   6. Patient has pain in the lower back with referred pain in the leg, patient has failed conservative therapy including PT and pharmacological management for more than 6 weeks and pain interferes with activities of daily living. MRI shows severe left neural foraminal stenosis at left L5-S1 and moderate at L4-5. Discussed lumbar TFESI L4  and L5.  Discussed the possibility of infection, bleeding, nerve damage, post dural puncture headache, increased pain, paraplegia. Patient understands and agrees.     RTC for injection and then 3 week follow up.     Juan J Thomas DO  Pain Management   [facility]         INSPECT REPORT    As part of the patient's treatment plan, I may be prescribing controlled substances. The patient has been made aware of appropriate use of such medications, including potential risk of somnolence, limited ability to drive and/or work safely, and the potential for dependence or overdose. It has also been made clear that these medications are for use by this patient only, without concomitant use of alcohol or other substances unless prescribed.     Patient has completed prescribing agreement detailing terms of continued prescribing of controlled substances, including monitoring INSPECT reports, urine drug screening, and pill counts if necessary. The patient is aware that inappropriate use will results in cessation of prescribing such medications.    INSPECT report has been reviewed and scanned into the patient's chart.      EMR Dragon/Transcription Disclaimer:   Much of this encounter note is an electronic transcription/translation of spoken language to printed text. The electronic translation of spoken language may permit erroneous, or at times, nonsensical words or phrases to be inadvertently transcribed; Although I have reviewed the note for such errors, some may still exist.

## 2022-04-11 ENCOUNTER — OFFICE VISIT (OUTPATIENT)
Dept: PAIN MEDICINE | Facility: CLINIC | Age: 72
End: 2022-04-11

## 2022-04-11 VITALS
DIASTOLIC BLOOD PRESSURE: 62 MMHG | BODY MASS INDEX: 28.14 KG/M2 | RESPIRATION RATE: 16 BRPM | OXYGEN SATURATION: 97 % | HEIGHT: 71 IN | SYSTOLIC BLOOD PRESSURE: 124 MMHG | WEIGHT: 201 LBS | HEART RATE: 54 BPM

## 2022-04-11 DIAGNOSIS — Z98.890 HISTORY OF LUMBAR LAMINECTOMY FOR SPINAL CORD DECOMPRESSION: ICD-10-CM

## 2022-04-11 DIAGNOSIS — M48.061 SPINAL STENOSIS OF LUMBAR REGION WITHOUT NEUROGENIC CLAUDICATION: Primary | ICD-10-CM

## 2022-04-11 DIAGNOSIS — M51.36 DDD (DEGENERATIVE DISC DISEASE), LUMBAR: ICD-10-CM

## 2022-04-11 PROCEDURE — 99204 OFFICE O/P NEW MOD 45 MIN: CPT | Performed by: STUDENT IN AN ORGANIZED HEALTH CARE EDUCATION/TRAINING PROGRAM

## 2022-04-11 RX ORDER — TIZANIDINE 4 MG/1
4 TABLET ORAL NIGHTLY PRN
Qty: 30 TABLET | Refills: 0 | Status: SHIPPED | OUTPATIENT
Start: 2022-04-11 | End: 2022-05-11

## 2022-04-19 ENCOUNTER — TELEPHONE (OUTPATIENT)
Dept: FAMILY MEDICINE CLINIC | Facility: CLINIC | Age: 72
End: 2022-04-19

## 2022-04-19 NOTE — TELEPHONE ENCOUNTER
Caller: Daren Zambrano    Relationship to patient: Self    Best call back number: [phone]    Patient is needing:     PATIENT AND HIS WIFE SAID THEY ARE CONCERNED ABOUT WHETHER THEY SHOULD GET THE ADDITIONAL SECOND COVID-19 BOOSTER SHOT    HE IS ALSO WANTING TO SEE IF THEY SHOULD TAKE ANOTHER PHIZER BOOSTER OR SWITCH TO MODERNA    HE SAID THEY HAVE TAKEN ALL PHIZER SHOTS SO FAR

## 2022-04-28 ENCOUNTER — HOSPITAL ENCOUNTER (OUTPATIENT)
Dept: PAIN MEDICINE | Facility: HOSPITAL | Age: 72
Discharge: HOME | End: 2022-04-28

## 2022-04-28 VITALS
WEIGHT: 196.8 LBS | OXYGEN SATURATION: 97 % | DIASTOLIC BLOOD PRESSURE: 81 MMHG | HEIGHT: 71 IN | SYSTOLIC BLOOD PRESSURE: 137 MMHG | RESPIRATION RATE: 16 BRPM | HEART RATE: 52 BPM | BODY MASS INDEX: 27.55 KG/M2 | TEMPERATURE: 96.9 F

## 2022-04-28 DIAGNOSIS — M51.36 DDD (DEGENERATIVE DISC DISEASE), LUMBAR: Primary | ICD-10-CM

## 2022-04-28 DIAGNOSIS — R52 PAIN: ICD-10-CM

## 2022-04-28 PROCEDURE — 64483 NJX AA&/STRD TFRM EPI L/S 1: CPT | Performed by: STUDENT IN AN ORGANIZED HEALTH CARE EDUCATION/TRAINING PROGRAM

## 2022-04-28 PROCEDURE — 77003 FLUOROGUIDE FOR SPINE INJECT: CPT

## 2022-04-28 PROCEDURE — 64484 NJX AA&/STRD TFRM EPI L/S EA: CPT | Performed by: STUDENT IN AN ORGANIZED HEALTH CARE EDUCATION/TRAINING PROGRAM

## 2022-04-28 PROCEDURE — 0 IOPAMIDOL 41 % SOLUTION: Performed by: STUDENT IN AN ORGANIZED HEALTH CARE EDUCATION/TRAINING PROGRAM

## 2022-04-28 PROCEDURE — 25010000002 DEXAMETHASONE SODIUM PHOSPHATE 10 MG/ML SOLUTION: Performed by: STUDENT IN AN ORGANIZED HEALTH CARE EDUCATION/TRAINING PROGRAM

## 2022-04-28 RX ORDER — BUPIVACAINE HYDROCHLORIDE 2.5 MG/ML
10 INJECTION, SOLUTION EPIDURAL; INFILTRATION; INTRACAUDAL ONCE
Status: COMPLETED | OUTPATIENT
Start: 2022-04-28 | End: 2022-04-28

## 2022-04-28 RX ORDER — DEXAMETHASONE SODIUM PHOSPHATE 10 MG/ML
10 INJECTION, SOLUTION INTRAMUSCULAR; INTRAVENOUS ONCE
Status: COMPLETED | OUTPATIENT
Start: 2022-04-28 | End: 2022-04-28

## 2022-04-28 RX ORDER — LIDOCAINE HYDROCHLORIDE 10 MG/ML
5 INJECTION, SOLUTION EPIDURAL; INFILTRATION; INTRACAUDAL; PERINEURAL ONCE
Status: COMPLETED | OUTPATIENT
Start: 2022-04-28 | End: 2022-04-28

## 2022-04-28 RX ADMIN — LIDOCAINE HYDROCHLORIDE 5 ML: 10 INJECTION, SOLUTION EPIDURAL; INFILTRATION; INTRACAUDAL; PERINEURAL at 10:35

## 2022-04-28 RX ADMIN — BUPIVACAINE HYDROCHLORIDE 10 ML: 2.5 INJECTION, SOLUTION EPIDURAL; INFILTRATION; INTRACAUDAL; PERINEURAL at 10:43

## 2022-04-28 RX ADMIN — IOPAMIDOL 3 ML: 408 INJECTION, SOLUTION INTRATHECAL at 10:42

## 2022-04-28 RX ADMIN — DEXAMETHASONE SODIUM PHOSPHATE 10 MG: 10 INJECTION, SOLUTION INTRAMUSCULAR; INTRAVENOUS at 10:43

## 2022-04-28 NOTE — PROCEDURES
PROCEDURE: LEFT L4, L5 TFESI    PREOPERATIVE DIAGNOSIS:  Lumbar Spinal Stenosis  Lumbar DDD    POSTOPERATIVE DIAGNOSIS:  Same     PROCEDURE NOTE:  After obtaining written informed consent patient was taken to the procedure room. Pre-procedure blood pressure and pulse were stable and recorded in patients clinic chart.     The patient was placed in a prone position and left  lumbar area was prepped with chloraprep times three and draped in the usual sterile fashion.  Under fluoroscopic guidance left  L5 neural foramen was identified. 2 ml of 1% lidocaine was used to anesthetize the skin. A 22-gauge 5-inch spinal needle was inserted through the skin. The needle was placed in L 5 neural foramina under fluoroscopic control. Placement was confirmed under oblique, AP and lateral fluoroscopic view.  2 ml of omnipaque dye was injected and showed good spread of the dye in the nerve root and the epidural space.  5 mg of decadron and 1 cc of 0.25% bupivicaine was injected. Same procedure was repeated at Left L4 neural foramen.     After the procedure the needle was flushed and was removed. Skin was cleaned and a sterile dressing was applied.    Following the procedure the patient's vital signs were stable. The patient was discharged home in good condition after being given discharge instructions.    COMPLICATIONS None    Juan J Thomas DO  Pain Management   [facility]

## 2022-04-28 NOTE — H&P
H and P reviewed from previous visit and no changes to patient's clinical presentation. Will proceed with procedure as planned. Patient denies history of DM and being on blood thinners.    Juan J Thomas DO  Pain Management   [facility]

## 2022-04-28 NOTE — DISCHARGE INSTRUCTIONS

## 2022-04-29 ENCOUNTER — TELEPHONE (OUTPATIENT)
Dept: PAIN MEDICINE | Facility: HOSPITAL | Age: 72
End: 2022-04-29

## 2022-04-29 NOTE — TELEPHONE ENCOUNTER
Post procedure phone call completed.  Pt states they are doing good and denies questions or concerns.

## 2022-05-17 NOTE — PROGRESS NOTES
Subjective   Daren Zambrano is a 72 y.o. male is here for follow up for lower back pain. Patient was last seen on 4/28/2022 for left TFESI L4 and L5 with 50% pain relief. He is able to do more activities.     On last visit:     Lower back pain is 4/10 on VAS, at maximum 8/10.  Pain is aching, deep, sharp, tingling, numbness, spasms in nature.  Pain is referred to left-sided buttock and has numbness in the left lateral thigh and left calf.  Pain is constant.  Improved by Celebrex.  Worse with standing, bending forward or increase activities.  Chronic weakness with right plantarflexion as per patient.  He uses cane for balance.    Previous Injection:   4/20/2022-left TFESI L4 and L5- 50% pain relief; significant functional improvement.       Hx: Referred by Mei Altamirano MD for  lower back pain.  His initial pain started in 2010 and had epidurals with variant degrees of success. His pain progressed to the point where he was barely able to walk and  s/p surgeries as described in PMH in 2019. He used to ride bicycle for 25 miles before pain started. Walks about 2.5 miles/day - slow walk. He would like to get back to his normal activities which he is not able to do due to significant pain.        PHQ-9- 1  SOAPP-0      PMH:   Partial laminectomy of L3-5 with Coflex implant (b/w/ L3-4; L4-5), L4-S1 decompression, left side discectomy by Dr. Mckinney -2019; Hypertension, synovial cyst of popliteal space, SCC of skin LLE, s/p L hip replacement      Current Medications:   Celebrex 200 mg       Past Medications:  Gabapentin- stopped as neuropathic pain had resolved.   Tizanidine 4 mg qhs PRN. - restless leg syndrome      Past Modalities:  TENS:                                                                          no                                                  Physical Therapy Within The Last 6 Months              No (2019- didn't have relief).   Psychotherapy                                                             no  Massage Therapy                                                       no     Patient Complains Of:  Uro-Fecal Incontinence          no  Weight Gain/Loss                   no  Fever/Chills                             no  Weakness                               Yes (uses cane for balance issues).               Current Outpatient Medications:   •  amLODIPine (NORVASC) 2.5 MG tablet, Take 1 tablet by mouth Daily., Disp: 90 tablet, Rfl: 3  •  aspirin 81 MG EC tablet, Take 81 mg by mouth Daily. Stop 7-22-19 for surgery, Disp: , Rfl:   •  celecoxib (CeleBREX) 200 MG capsule, Take 1 capsule by mouth 2 (Two) Times a Day. Stop 7-22-19 for surgery, Disp: 180 capsule, Rfl: 3  •  losartan-hydrochlorothiazide (HYZAAR) 100-25 MG per tablet, TAKE 1 TABLET EVERY DAY, Disp: 90 tablet, Rfl: 3  •  Multiple Vitamins-Minerals (MULTIVITAMIN WITH MINERALS) tablet tablet, Take 1 tablet by mouth Daily. Stop 7-22-19 for surgery, Disp: , Rfl:   •  Omega-3 Fatty Acids (FISH OIL OMEGA-3 PO), Take 1 tablet by mouth Daily. 1000/300mg.  Stop 7-22-19 for surgery, Disp: , Rfl:   •  vitamin E 100 UNIT capsule, Take 400 Units by mouth Daily. Stop 7-22-19 for surgery, Disp: , Rfl:     The following portions of the patient's history were reviewed and updated as appropriate: allergies, current medications, past family history, past medical history, past social history, past surgical history, and problem list.      REVIEW OF PERTINENT MEDICAL DATA    Past Medical History:   Diagnosis Date   • Clavicle fracture    • Hypertension    • Injury of back     stenosis   • Low back pain    • Osteoarthritis    • Slow to wake up after anesthesia      Past Surgical History:   Procedure Laterality Date   • DUPUYTREN CONTRACTURE RELEASE     • FOOT SURGERY  1978    TUMOR REMOVED   • KNEE ARTHROSCOPY Right     2013 AND 2016   • LUMBAR LAMINECTOMY DISCECTOMY DECOMPRESSION Bilateral 7/29/2019    Procedure: PDC PARTIAL LAMINECTOMY OF L3-L5 WITH COFLEX  IMPLANTATION, L5-S1 DECOMPRESSION LEFT SIDE DISCECTOMY;  Surgeon: Isabel Mckinney MD;  Location: [facility] MAIN OR;  Service: Orthopedic Spine   • RESECTION DISTAL CLAVICLE  2000     Family History   Problem Relation Age of Onset   • Dementia Mother    • Other Mother         knee replacement   • Cancer Father      Social History     Socioeconomic History   • Marital status:    Tobacco Use   • Smoking status: Former Smoker     Types: Cigarettes     Quit date: 7/10/1977     Years since quittin.8   • Smokeless tobacco: Never Used   Vaping Use   • Vaping Use: Never used   Substance and Sexual Activity   • Alcohol use: Yes     Alcohol/week: 1.0 standard drink     Types: 1 Cans of beer per week     Comment: Occ.   • Drug use: No   • Sexual activity: Yes         Review of Systems      Vitals:    22 1048   BP: 119/60   Pulse: 58   Resp: 16   SpO2: 97%   Weight: 88.9 kg (196 lb)   PainSc:   4         Objective   Physical Exam  Musculoskeletal:         General: Tenderness present.        Legs:    Neurological:      Deep Tendon Reflexes:      Reflex Scores:       Patellar reflexes are 1+ on the right side and 1+ on the left side.       Achilles reflexes are 1+ on the right side and 1+ on the left side.     Comments: Motor strength 5/5 b/l LE  Sensory intact b/l LE             Imaging Reviewed:  MRI lumbar spine-2021  -Postsurgical changes including Coflex placement at the posterior elements of L3-4 and L4-5, presumed to left L5-S1 laminectomy.     -[id number]-[id number] on left side.  Moderate facet arthropathy.  Moderate to severe left neural foraminal stenosis which has progressed since last exam.  Mild right.  L2-3-severe left and moderate right facet arthropathy.  Moderate central canal stenosis.  Moderate left lateral recess and neural foraminal stenosis.  Mild right.  L3-4-Coflex device between the spinous processes.  Severe facet arthropathy.  Mild central canal stenosis.  Moderate left neural foraminal  stenosis.  Severe right neural foraminal stenosis appears to be impinging upon right L3 nerve root within the foramen, appears unchanged.  L4-5-Coflex device.  Severe left and moderate right facet arthropathy.  Mild central canal stenosis.  Severe right neural foraminal stenosis impinging on right L4 nerve root.  Similar to prior study.  Mild to moderate left neural foraminal narrowing.  [id number]-[id number] left-sided laminectomy.  Moderate bilateral facet arthropathy.  Moderate to severe left neural foraminal stenosis and severe right neural foraminal stenosis.     Bilateral hip x-rays-11/2019  Severe left hip osteoarthritis  Moderate degenerative changes of right hip joint.         Assessment:    1. DDD (degenerative disc disease), lumbar    2. Spinal stenosis of lumbar region without neurogenic claudication    3. History of lumbar laminectomy for spinal cord decompression         Plan:   1. Defer UDS for now.   2. We discussed trying a course of formal physical therapy.  Physical therapy can help strengthen and stretch the muscles around the joints. Continue to be as active as possible.   3. We had discussion regarding adding gabapentin for nerve pain and benefits and risks. Patient would like to hold off on starting it.   4. Excellent relief from TFESI. Will consider repeating in future.     RTC as needed.     Juan J Thomas DO  Pain Management   [facility]         INSPECT REPORT    As part of the patient's treatment plan, I may be prescribing controlled substances. The patient has been made aware of appropriate use of such medications, including potential risk of somnolence, limited ability to drive and/or work safely, and the potential for dependence or overdose. It has also been made clear that these medications are for use by this patient only, without concomitant use of alcohol or other substances unless prescribed.     Patient has completed prescribing agreement detailing terms of continued prescribing of  controlled substances, including monitoring INSPECT reports, urine drug screening, and pill counts if necessary. The patient is aware that inappropriate use will results in cessation of prescribing such medications.    INSPECT report has been reviewed and scanned into the patient's chart.

## 2022-05-18 ENCOUNTER — OFFICE VISIT (OUTPATIENT)
Dept: PAIN MEDICINE | Facility: CLINIC | Age: 72
End: 2022-05-18

## 2022-05-18 VITALS
DIASTOLIC BLOOD PRESSURE: 60 MMHG | OXYGEN SATURATION: 97 % | RESPIRATION RATE: 16 BRPM | BODY MASS INDEX: 27.34 KG/M2 | SYSTOLIC BLOOD PRESSURE: 119 MMHG | WEIGHT: 196 LBS | HEART RATE: 58 BPM

## 2022-05-18 DIAGNOSIS — M51.36 DDD (DEGENERATIVE DISC DISEASE), LUMBAR: Primary | ICD-10-CM

## 2022-05-18 DIAGNOSIS — Z98.890 HISTORY OF LUMBAR LAMINECTOMY FOR SPINAL CORD DECOMPRESSION: ICD-10-CM

## 2022-05-18 DIAGNOSIS — M48.061 SPINAL STENOSIS OF LUMBAR REGION WITHOUT NEUROGENIC CLAUDICATION: ICD-10-CM

## 2022-05-18 PROCEDURE — 99213 OFFICE O/P EST LOW 20 MIN: CPT | Performed by: STUDENT IN AN ORGANIZED HEALTH CARE EDUCATION/TRAINING PROGRAM

## 2022-06-08 ENCOUNTER — OFFICE VISIT (OUTPATIENT)
Dept: FAMILY MEDICINE CLINIC | Facility: CLINIC | Age: 72
End: 2022-06-08

## 2022-06-08 ENCOUNTER — LAB (OUTPATIENT)
Dept: FAMILY MEDICINE CLINIC | Facility: CLINIC | Age: 72
End: 2022-06-08

## 2022-06-08 VITALS
HEIGHT: 71 IN | WEIGHT: 204 LBS | DIASTOLIC BLOOD PRESSURE: 66 MMHG | OXYGEN SATURATION: 99 % | RESPIRATION RATE: 16 BRPM | SYSTOLIC BLOOD PRESSURE: 114 MMHG | HEART RATE: 50 BPM | TEMPERATURE: 96.4 F | BODY MASS INDEX: 28.56 KG/M2

## 2022-06-08 DIAGNOSIS — I10 PRIMARY HYPERTENSION: ICD-10-CM

## 2022-06-08 DIAGNOSIS — Z23 COVID-19 VACCINE ADMINISTERED: ICD-10-CM

## 2022-06-08 DIAGNOSIS — Z12.11 SCREENING FOR COLON CANCER: Primary | ICD-10-CM

## 2022-06-08 DIAGNOSIS — M48.061 SPINAL STENOSIS OF LUMBAR REGION, UNSPECIFIED WHETHER NEUROGENIC CLAUDICATION PRESENT: ICD-10-CM

## 2022-06-08 DIAGNOSIS — E78.41 ELEVATED LIPOPROTEIN(A): ICD-10-CM

## 2022-06-08 LAB
ALBUMIN SERPL-MCNC: 4.6 G/DL (ref 3.5–5.2)
ALBUMIN/GLOB SERPL: 1.9 G/DL
ALP SERPL-CCNC: 73 U/L (ref 39–117)
ALT SERPL W P-5'-P-CCNC: 27 U/L (ref 1–41)
ANION GAP SERPL CALCULATED.3IONS-SCNC: 12.7 MMOL/L (ref 5–15)
AST SERPL-CCNC: 32 U/L (ref 1–40)
BILIRUB SERPL-MCNC: 0.4 MG/DL (ref 0–1.2)
BUN SERPL-MCNC: 29 MG/DL (ref 8–23)
BUN/CREAT SERPL: 33.3 (ref 7–25)
CALCIUM SPEC-SCNC: 9.6 MG/DL (ref 8.6–10.5)
CHLORIDE SERPL-SCNC: 100 MMOL/L (ref 98–107)
CHOLEST SERPL-MCNC: 168 MG/DL (ref 0–200)
CO2 SERPL-SCNC: 24.3 MMOL/L (ref 22–29)
CREAT SERPL-MCNC: 0.87 MG/DL (ref 0.76–1.27)
EGFRCR SERPLBLD CKD-EPI 2021: 91.7 ML/MIN/1.73
GLOBULIN UR ELPH-MCNC: 2.4 GM/DL
GLUCOSE SERPL-MCNC: 88 MG/DL (ref 65–99)
HDLC SERPL-MCNC: 53 MG/DL (ref 40–60)
LDLC SERPL CALC-MCNC: 106 MG/DL (ref 0–100)
LDLC/HDLC SERPL: 2.02 {RATIO}
POTASSIUM SERPL-SCNC: 3.8 MMOL/L (ref 3.5–5.2)
PROT SERPL-MCNC: 7 G/DL (ref 6–8.5)
SODIUM SERPL-SCNC: 137 MMOL/L (ref 136–145)
TRIGL SERPL-MCNC: 41 MG/DL (ref 0–150)
VLDLC SERPL-MCNC: 9 MG/DL (ref 5–40)

## 2022-06-08 PROCEDURE — 80053 COMPREHEN METABOLIC PANEL: CPT | Performed by: INTERNAL MEDICINE

## 2022-06-08 PROCEDURE — 80061 LIPID PANEL: CPT | Performed by: INTERNAL MEDICINE

## 2022-06-08 PROCEDURE — 86769 SARS-COV-2 COVID-19 ANTIBODY: CPT | Performed by: INTERNAL MEDICINE

## 2022-06-08 PROCEDURE — 36415 COLL VENOUS BLD VENIPUNCTURE: CPT | Performed by: INTERNAL MEDICINE

## 2022-06-08 PROCEDURE — 99214 OFFICE O/P EST MOD 30 MIN: CPT | Performed by: INTERNAL MEDICINE

## 2022-06-08 NOTE — PROGRESS NOTES
Rooming Tab(CC,VS,Pt Hx,Fall Screen)  Chief Complaint   Patient presents with   • Hypertension   • Back Pain       Subjective      The patient is a 72-year-old male who presents for a 6-month follow up.    Back pain  The patient reports significant improvement in his lower back pain. He was seen by pain management and underwent epidural steroid injections 04/28/2022. Since the injections, he is now able to increase his activity level and is back to playing golf. He tries to stretch prior to activity. He notes that he wears supportive footwear. He reports good sleep and states the pain no longer wakes him up at night. The patient is currently taking Celebrex twice daily and denies any easy bruising, heartburn or melena while on this medication.     Dental pain  The patient reports he started to experience left upper dental pain approximately one week after receiving steroid injections for his back and inquires if this could be related. The patient was seen by his dentist and prescribed an antibiotic. He states that the pain subsided after taking the antibiotic for four days.    Decreased hearing  The patient reports decreased hearing, which he attributes to his age and years of target practice. 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    Hypertension  The patient reports intermittent dizziness upon standing.     Skin lesion  The patient was seen by dermatology for further evaluation of his left posterior calf skin lesion. The patient reports the lesion was biopsied and found to be cancerous. He then underwent removal of the lesion and has required no further treatment.     Health maintenance  His last colonoscopy was done on 02/2012 with Dr. Neal and he was told he needed a 10 year follow up. The patient reports he received a Td vaccine on 06/01/2022 through the health department. The patient has had two doses of the COVID-19 vaccine as well as one booster vaccine. He inquires about antibody  "testing today as he is interested in a second booster. He denies any issues with anxiety or depression.    I have reviewed and updated his medications, medical history and problem list during today's office visit.     Patient Care Team:  Mei Kimball MD as PCP - General (Internal Medicine)    Problem List Tab  Medications Tab  Synopsis Tab  Chart Review Tab  Care Everywhere Tab  Immunizations Tab  Patient History Tab    Social History     Tobacco Use   • Smoking status: Former Smoker     Types: Cigarettes     Quit date: 7/10/1977     Years since quittin.9   • Smokeless tobacco: Never Used   Substance Use Topics   • Alcohol use: Yes     Alcohol/week: 1.0 standard drink     Types: 1 Cans of beer per week     Comment: Occ.       Review of Systems   A review of systems was performed, and the pertinent positives are noted in the HPI.    Objective     Rooming Tab(CC,VS,Pt Hx,Fall Screen)  /66 (BP Location: Left arm, Patient Position: Sitting, Cuff Size: Adult)   Pulse 50   Temp 96.4 °F (35.8 °C) (Infrared)   Resp 16   Ht 180.3 cm (71\")   Wt 92.5 kg (204 lb)   SpO2 99%   BMI 28.45 kg/m²     Body mass index is 28.45 kg/m².    Physical Exam  Vitals and nursing note reviewed.   Constitutional:       Appearance: Normal appearance. He is well-developed.   HENT:      Head: Normocephalic and atraumatic.      Right Ear: Tympanic membrane normal.      Left Ear: Tympanic membrane normal.      Nose: No rhinorrhea.      Mouth/Throat:      Pharynx: No posterior oropharyngeal erythema.   Eyes:      Pupils: Pupils are equal, round, and reactive to light.   Cardiovascular:      Rate and Rhythm: Normal rate and regular rhythm.      Pulses: Normal pulses.      Heart sounds: Normal heart sounds. No murmur heard.  Pulmonary:      Effort: Pulmonary effort is normal.      Breath sounds: Normal breath sounds.   Abdominal:      General: Bowel sounds are normal. There is no distension.      Palpations: Abdomen is soft. "   Musculoskeletal:         General: No tenderness.      Cervical back: Normal range of motion and neck supple.   Skin:     Capillary Refill: Capillary refill takes less than 2 seconds.   Neurological:      Mental Status: He is alert and oriented to person, place, and time.   Psychiatric:         Mood and Affect: Mood normal.         Behavior: Behavior normal.          Statin Choice Calculator  Data Reviewed:         The data below has been reviewed by Mei Kimball MD on 06/08/2022.          Assessment & Plan   Order Review Tab  Health Maintenance Tab  Patient Plan/Order Tab  Diagnoses and all orders for this visit:    1. Screening for colon cancer (Primary)  -     Ambulatory Referral For Screening Colonoscopy    2. COVID-19 vaccine administered  Comments:  check antibodies  Orders:  -     SARS-CoV-2 Semi-Quant Total Ab    3. Primary hypertension  Comments:  stay on current meds- doing well    4. Elevated lipoprotein(a)  -     Lipid Panel  -     Comprehensive Metabolic Panel    5. Spinal stenosis of lumbar region, unspecified whether neurogenic claudication present        Wrapup Tab  Return in about 6 months (around 12/8/2022), or if symptoms worsen or fail to improve, for Medicare Wellness.       They were informed of the diagnosis and treatment plan and directed to f/u for any further problems or concerns.    Transcribed from ambient dictation for Mei Kimball MD by Skyla Wilson.  06/08/22   10:00 EDT    Patient verbalized consent to the visit recording.

## 2022-06-10 LAB
SARS-COV-2 AB SERPL IA-ACNC: NORMAL U/ML
SARS-COV-2 AB SERPL IA-ACNC: NORMAL U/ML
SARS-COV-2 AB SERPL-IMP: POSITIVE

## 2022-06-15 NOTE — TELEPHONE ENCOUNTER
SUBJECTIVE:   CC: Gloria Hoff is an 21 year old woman who presents for preventive health visit.     {  Patient has been advised of split billing requirements and indicates understanding: Yes  Healthy Habits:     Getting at least 3 servings of Calcium per day:  Yes    Bi-annual eye exam:  Yes    Dental care twice a year:  NO    Sleep apnea or symptoms of sleep apnea:  None    Diet:  Regular (no restrictions)    Frequency of exercise:  None    Taking medications regularly:  Yes    Medication side effects:  None    PHQ-2 Total Score: 5    Additional concerns today:  No      Did therapy senior year few years ago. Would like to resume. Maybe see psychiatrist also.   For depression/anxiety. Was on a few medications before, hurt her stomach so she stopped them.    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       Today's PHQ-2 Score:   PHQ-2 ( 1999 Pfizer) 6/15/2022   Q1: Little interest or pleasure in doing things 3   Q2: Feeling down, depressed or hopeless 2   PHQ-2 Score 5   Q1: Little interest or pleasure in doing things More than half the days   Q2: Feeling down, depressed or hopeless More than half the days   PHQ-2 Score 4       Abuse: Current or Past (Physical, Sexual or Emotional) - No  Do you feel safe in your environment? Yes    Have you ever done Advance Care Planning? (For example, a Health Directive, POLST, or a discussion with a medical provider or your loved ones about your wishes): No, advance care planning information given to patient to review.  Patient plans to discuss their wishes with loved ones or provider.      Social History     Tobacco Use     Smoking status: Never Smoker     Smokeless tobacco: Never Used     Tobacco comment: no exposure second hand smoking.   Substance Use Topics     Alcohol use: Yes     Comment: occasional     If you drink alcohol do you typically have >3 drinks per day or >7 drinks per week? No    Alcohol Use 6/15/2022   Prescreen: >3 drinks/day or >7  rx sent   "drinks/week? No   Prescreen: >3 drinks/day or >7 drinks/week? -   No flowsheet data found.    Reviewed orders with patient.  Reviewed health maintenance and updated orders accordingly - Yes  Lab work is in process    Breast Cancer Screening:        History of abnormal Pap smear: NO - age 21-29 PAP every 3 years recommended     Reviewed and updated as needed this visit by clinical staff   Tobacco  Allergies  Meds  Problems  Med Hx  Surg Hx  Fam Hx  Soc   Hx          Reviewed and updated as needed this visit by Provider     Meds  Problems   Surg Hx  Fam Hx               Review of Systems   Constitutional: Negative for chills and fever.   HENT: Negative for congestion, ear pain, hearing loss and sore throat.    Eyes: Negative for pain and visual disturbance.   Respiratory: Negative for cough and shortness of breath.    Cardiovascular: Negative for chest pain, palpitations and peripheral edema.   Gastrointestinal: Negative for abdominal pain, constipation, diarrhea, heartburn, hematochezia and nausea.   Breasts:  Negative for tenderness, breast mass and discharge.   Genitourinary: Negative for dysuria, frequency, genital sores, hematuria, pelvic pain, urgency, vaginal bleeding and vaginal discharge.   Musculoskeletal: Negative for arthralgias, joint swelling and myalgias.   Skin: Negative for rash.   Neurological: Negative for dizziness, weakness, headaches and paresthesias.   Psychiatric/Behavioral: Negative for mood changes. The patient is not nervous/anxious.           OBJECTIVE:   /72 (BP Location: Right arm, Patient Position: Sitting, Cuff Size: Adult Regular)   Pulse 74   Temp 98.9  F (37.2  C) (Tympanic)   Resp 16   Ht 1.556 m (5' 1.25\")   Wt 55 kg (121 lb 3.2 oz)   LMP 05/30/2022   SpO2 99%   BMI 22.71 kg/m    Physical Exam  GENERAL: healthy, alert and no distress  EYES: Eyes grossly normal to inspection, PERRL and conjunctivae and sclerae normal  HENT: ear canals and TM's normal, nose " and mouth without ulcers or lesions  NECK: no adenopathy, no asymmetry, masses, or scars and thyroid normal to palpation  RESP: lungs clear to auscultation - no rales, rhonchi or wheezes  BREAST: normal without masses, tenderness or nipple discharge and no palpable axillary masses or adenopathy  CV: regular rate and rhythm, normal S1 S2, no S3 or S4, no murmur, click or rub, no peripheral edema   ABDOMEN: soft, nontender, no hepatosplenomegaly, no masses and bowel sounds normal   (female): normal female external genitalia, normal urethral meatus, vaginal mucosa pink, moist, well rugated, and normal cervix  MS: no gross musculoskeletal defects noted, no edema  SKIN: no suspicious lesions or rashes  NEURO: Normal strength and tone, mentation intact and speech normal  PSYCH: mentation appears normal, affect normal/bright    Diagnostic Test Results:  Labs reviewed in Epic  No results found for this or any previous visit (from the past 24 hour(s)).    ASSESSMENT/PLAN:   Hlu was seen today for physical.    Diagnoses and all orders for this visit:    Routine general medical examination at a health care facility    Current moderate episode of major depressive disorder without prior episode (H)  -     Adult Mental Health  Referral; Future    Anxiety  -     Adult Mental Health  Referral; Future    Screening for STDs (sexually transmitted diseases)  -     NEISSERIA GONORRHOEA PCR  -     CHLAMYDIA TRACHOMATIS PCR    Cervical cancer screening  -     Pap Screen only - recommended age 21 - 24 years    Other orders  -     REVIEW OF HEALTH MAINTENANCE PROTOCOL ORDERS      Patient would like referral to psychiatrist or psychologist  Pap smear performed      Patient has been advised of split billing requirements and indicates understanding: Yes    COUNSELING:  Reviewed preventive health counseling, as reflected in patient instructions    Estimated body mass index is 22.71 kg/m  as calculated from the following:     "Height as of this encounter: 1.556 m (5' 1.25\").    Weight as of this encounter: 55 kg (121 lb 3.2 oz).        She reports that she has never smoked. She has never used smokeless tobacco.      Counseling Resources:  ATP IV Guidelines  Pooled Cohorts Equation Calculator  Breast Cancer Risk Calculator  BRCA-Related Cancer Risk Assessment: FHS-7 Tool  FRAX Risk Assessment  ICSI Preventive Guidelines  Dietary Guidelines for Americans, 2010  [employer]'s MyPlate  ASA Prophylaxis  Lung CA Screening    DULCE MARIA Peoples, NP-C  [facility]  Answers for HPI/ROS submitted by the patient on 6/15/2022  If you checked off any problems, how difficult have these problems made it for you to do your work, take care of things at home, or get along with other people?: Somewhat difficult  PHQ9 TOTAL SCORE: 13  FABIAN 7 TOTAL SCORE: 16      "

## 2022-07-01 ENCOUNTER — TELEPHONE (OUTPATIENT)
Dept: PAIN MEDICINE | Facility: CLINIC | Age: 72
End: 2022-07-01

## 2022-07-01 NOTE — TELEPHONE ENCOUNTER
Caller: Daren Zambrano    Relationship to patient: Self    Best call back number: [phone]    Chief complaint: SPINE PAIN    Type of visit: INJECTION    Requested date: PT WOULD LIKE TO SCHEDULE AS SOON AS HE CAN.     If rescheduling, when is the original appointment: NA    Additional notes: NA

## 2022-07-04 DIAGNOSIS — M48.061 SPINAL STENOSIS OF LUMBAR REGION WITHOUT NEUROGENIC CLAUDICATION: ICD-10-CM

## 2022-07-04 DIAGNOSIS — M51.36 DDD (DEGENERATIVE DISC DISEASE), LUMBAR: Primary | ICD-10-CM

## 2022-07-14 ENCOUNTER — HOSPITAL ENCOUNTER (OUTPATIENT)
Dept: PAIN MEDICINE | Facility: HOSPITAL | Age: 72
Discharge: HOME | End: 2022-07-14

## 2022-07-14 VITALS
BODY MASS INDEX: 28 KG/M2 | SYSTOLIC BLOOD PRESSURE: 138 MMHG | DIASTOLIC BLOOD PRESSURE: 81 MMHG | WEIGHT: 200 LBS | TEMPERATURE: 98 F | HEART RATE: 49 BPM | OXYGEN SATURATION: 97 % | RESPIRATION RATE: 16 BRPM | HEIGHT: 71 IN

## 2022-07-14 DIAGNOSIS — R52 PAIN: ICD-10-CM

## 2022-07-14 DIAGNOSIS — M48.061 SPINAL STENOSIS OF LUMBAR REGION WITHOUT NEUROGENIC CLAUDICATION: ICD-10-CM

## 2022-07-14 DIAGNOSIS — M51.36 DDD (DEGENERATIVE DISC DISEASE), LUMBAR: ICD-10-CM

## 2022-07-14 PROCEDURE — 64483 NJX AA&/STRD TFRM EPI L/S 1: CPT | Performed by: STUDENT IN AN ORGANIZED HEALTH CARE EDUCATION/TRAINING PROGRAM

## 2022-07-14 PROCEDURE — 25010000002 DEXAMETHASONE SODIUM PHOSPHATE 10 MG/ML SOLUTION: Performed by: STUDENT IN AN ORGANIZED HEALTH CARE EDUCATION/TRAINING PROGRAM

## 2022-07-14 PROCEDURE — 64484 NJX AA&/STRD TFRM EPI L/S EA: CPT | Performed by: STUDENT IN AN ORGANIZED HEALTH CARE EDUCATION/TRAINING PROGRAM

## 2022-07-14 PROCEDURE — 77003 FLUOROGUIDE FOR SPINE INJECT: CPT

## 2022-07-14 PROCEDURE — 0 IOPAMIDOL 41 % SOLUTION: Performed by: STUDENT IN AN ORGANIZED HEALTH CARE EDUCATION/TRAINING PROGRAM

## 2022-07-14 RX ORDER — BUPIVACAINE HYDROCHLORIDE 2.5 MG/ML
10 INJECTION, SOLUTION EPIDURAL; INFILTRATION; INTRACAUDAL ONCE
Status: COMPLETED | OUTPATIENT
Start: 2022-07-14 | End: 2022-07-14

## 2022-07-14 RX ORDER — DEXAMETHASONE SODIUM PHOSPHATE 10 MG/ML
10 INJECTION, SOLUTION INTRAMUSCULAR; INTRAVENOUS ONCE
Status: COMPLETED | OUTPATIENT
Start: 2022-07-14 | End: 2022-07-14

## 2022-07-14 RX ORDER — LIDOCAINE HYDROCHLORIDE 10 MG/ML
5 INJECTION, SOLUTION EPIDURAL; INFILTRATION; INTRACAUDAL; PERINEURAL ONCE
Status: COMPLETED | OUTPATIENT
Start: 2022-07-14 | End: 2022-07-14

## 2022-07-14 RX ADMIN — BUPIVACAINE HYDROCHLORIDE 10 ML: 2.5 INJECTION, SOLUTION EPIDURAL; INFILTRATION; INTRACAUDAL; PERINEURAL at 11:01

## 2022-07-14 RX ADMIN — LIDOCAINE HYDROCHLORIDE 5 ML: 10 INJECTION, SOLUTION EPIDURAL; INFILTRATION; INTRACAUDAL; PERINEURAL at 10:49

## 2022-07-14 RX ADMIN — IOPAMIDOL 3 ML: 408 INJECTION, SOLUTION INTRATHECAL at 11:00

## 2022-07-14 RX ADMIN — DEXAMETHASONE SODIUM PHOSPHATE 10 MG: 10 INJECTION, SOLUTION INTRAMUSCULAR; INTRAVENOUS at 11:01

## 2022-07-14 NOTE — PROCEDURES
Procedure: LEFT L4-5 AND L5-S1 TFESI     PREOPERATIVE DIAGNOSIS:  Lumbar Spinal Stenosis  Lumbar DDD     POSTOPERATIVE DIAGNOSIS:  Same     PROCEDURE NOTE:  After obtaining written informed consent patient was taken to the procedure room. Pre-procedure blood pressure and pulse were stable and recorded in patients clinic chart.      The patient was placed in a prone position and right lumbar area was prepped with chloraprep times three and draped in the usual sterile fashion.  Under fluoroscopic guidance LEFT L4 neural foramen was identified. 2 ml of 1% lidocaine was used to anesthetize the skin. A 22-gauge 5-inch spinal needle was inserted through the skin. The needle was placed in L4 neural foramina under fluoroscopic control. Placement was confirmed under oblique, AP and lateral fluoroscopic view.  2 ml of omnipaque dye was injected and showed good spread of the dye in the nerve root and the epidural space.  5 mg dexamethasone. 1 cc of 0.25% bupivacaine was injected.      Same procedure was repeated at LEFT L5 neural foramen. Fluro tilted cephalad to open up foramen.      After the procedure the needle was flushed and was removed. Skin was cleaned and a sterile dressing was applied.     Following the procedure the patient's vital signs were stable. The patient was discharged home in good condition after being given discharge instructions.     COMPLICATIONS None     Juan J Thomas DO  Pain Management   [facility]

## 2022-07-14 NOTE — H&P
H and P reviewed from previous visit and no changes to patient's clinical presentation. Will proceed with procedure as planned. Patient denies history of DM and being on blood thinners.    Juan J Thomas DO  Pain Management   [facility]

## 2022-07-14 NOTE — DISCHARGE INSTRUCTIONS

## 2022-08-01 ENCOUNTER — OFFICE VISIT (OUTPATIENT)
Dept: PAIN MEDICINE | Facility: CLINIC | Age: 72
End: 2022-08-01

## 2022-08-01 VITALS
SYSTOLIC BLOOD PRESSURE: 115 MMHG | RESPIRATION RATE: 16 BRPM | OXYGEN SATURATION: 98 % | DIASTOLIC BLOOD PRESSURE: 57 MMHG | HEART RATE: 51 BPM

## 2022-08-01 DIAGNOSIS — M96.1 POST LAMINECTOMY SYNDROME: Primary | ICD-10-CM

## 2022-08-01 DIAGNOSIS — M48.061 SPINAL STENOSIS OF LUMBAR REGION WITHOUT NEUROGENIC CLAUDICATION: ICD-10-CM

## 2022-08-01 DIAGNOSIS — Z98.890 HISTORY OF LUMBAR LAMINECTOMY FOR SPINAL CORD DECOMPRESSION: ICD-10-CM

## 2022-08-01 PROCEDURE — 99214 OFFICE O/P EST MOD 30 MIN: CPT | Performed by: STUDENT IN AN ORGANIZED HEALTH CARE EDUCATION/TRAINING PROGRAM

## 2022-08-18 ENCOUNTER — ON CAMPUS - OUTPATIENT (OUTPATIENT)
Dept: URBAN - METROPOLITAN AREA HOSPITAL 2 | Facility: HOSPITAL | Age: 72
End: 2022-08-18
Payer: MEDICARE

## 2022-08-18 VITALS
HEART RATE: 51 BPM | DIASTOLIC BLOOD PRESSURE: 59 MMHG | DIASTOLIC BLOOD PRESSURE: 72 MMHG | SYSTOLIC BLOOD PRESSURE: 108 MMHG | RESPIRATION RATE: 12 BRPM | TEMPERATURE: 97 F | SYSTOLIC BLOOD PRESSURE: 106 MMHG | SYSTOLIC BLOOD PRESSURE: 112 MMHG | HEART RATE: 58 BPM | DIASTOLIC BLOOD PRESSURE: 63 MMHG | RESPIRATION RATE: 16 BRPM | DIASTOLIC BLOOD PRESSURE: 68 MMHG | DIASTOLIC BLOOD PRESSURE: 71 MMHG | SYSTOLIC BLOOD PRESSURE: 122 MMHG | DIASTOLIC BLOOD PRESSURE: 69 MMHG | OXYGEN SATURATION: 99 % | SYSTOLIC BLOOD PRESSURE: 128 MMHG | SYSTOLIC BLOOD PRESSURE: 119 MMHG | HEART RATE: 54 BPM | DIASTOLIC BLOOD PRESSURE: 52 MMHG | SYSTOLIC BLOOD PRESSURE: 105 MMHG | HEIGHT: 71 IN | OXYGEN SATURATION: 98 % | HEART RATE: 47 BPM | HEART RATE: 53 BPM | WEIGHT: 201 LBS | SYSTOLIC BLOOD PRESSURE: 130 MMHG

## 2022-08-18 DIAGNOSIS — K64.1 SECOND DEGREE HEMORRHOIDS: ICD-10-CM

## 2022-08-18 DIAGNOSIS — K57.30 DIVERTICULOSIS OF LARGE INTESTINE WITHOUT PERFORATION OR ABS: ICD-10-CM

## 2022-08-18 DIAGNOSIS — Z12.11 ENCOUNTER FOR SCREENING FOR MALIGNANT NEOPLASM OF COLON: ICD-10-CM

## 2022-08-18 PROCEDURE — G0121 COLON CA SCRN NOT HI RSK IND: HCPCS | Performed by: INTERNAL MEDICINE

## 2022-10-04 RX ORDER — LOSARTAN POTASSIUM AND HYDROCHLOROTHIAZIDE 25; 100 MG/1; MG/1
TABLET ORAL
Qty: 90 TABLET | Refills: 1 | Status: SHIPPED | OUTPATIENT
Start: 2022-10-04

## 2022-12-14 ENCOUNTER — LAB (OUTPATIENT)
Dept: FAMILY MEDICINE CLINIC | Facility: CLINIC | Age: 72
End: 2022-12-14

## 2022-12-14 ENCOUNTER — OFFICE VISIT (OUTPATIENT)
Dept: FAMILY MEDICINE CLINIC | Facility: CLINIC | Age: 72
End: 2022-12-14

## 2022-12-14 VITALS
OXYGEN SATURATION: 97 % | HEART RATE: 59 BPM | TEMPERATURE: 97.1 F | DIASTOLIC BLOOD PRESSURE: 67 MMHG | HEIGHT: 71 IN | BODY MASS INDEX: 28.7 KG/M2 | RESPIRATION RATE: 16 BRPM | SYSTOLIC BLOOD PRESSURE: 103 MMHG | WEIGHT: 205 LBS

## 2022-12-14 DIAGNOSIS — M48.061 SPINAL STENOSIS OF LUMBAR REGION, UNSPECIFIED WHETHER NEUROGENIC CLAUDICATION PRESENT: ICD-10-CM

## 2022-12-14 DIAGNOSIS — I10 PRIMARY HYPERTENSION: ICD-10-CM

## 2022-12-14 DIAGNOSIS — Z00.00 MEDICARE ANNUAL WELLNESS VISIT, SUBSEQUENT: Primary | ICD-10-CM

## 2022-12-14 DIAGNOSIS — M16.12 PRIMARY OSTEOARTHRITIS OF LEFT HIP: ICD-10-CM

## 2022-12-14 DIAGNOSIS — I10 ESSENTIAL HYPERTENSION: ICD-10-CM

## 2022-12-14 DIAGNOSIS — E55.9 VITAMIN D DEFICIENCY: ICD-10-CM

## 2022-12-14 DIAGNOSIS — E78.41 ELEVATED LIPOPROTEIN(A): ICD-10-CM

## 2022-12-14 DIAGNOSIS — Z12.5 SCREENING FOR PROSTATE CANCER: ICD-10-CM

## 2022-12-14 LAB
25(OH)D3 SERPL-MCNC: 43.3 NG/ML (ref 30–100)
ALBUMIN SERPL-MCNC: 4.3 G/DL (ref 3.5–5.2)
ALBUMIN/GLOB SERPL: 1.7 G/DL
ALP SERPL-CCNC: 78 U/L (ref 39–117)
ALT SERPL W P-5'-P-CCNC: 25 U/L (ref 1–41)
ANION GAP SERPL CALCULATED.3IONS-SCNC: 9 MMOL/L (ref 5–15)
AST SERPL-CCNC: 26 U/L (ref 1–40)
BASOPHILS # BLD AUTO: 0.06 10*3/MM3 (ref 0–0.2)
BASOPHILS NFR BLD AUTO: 1.3 % (ref 0–1.5)
BILIRUB SERPL-MCNC: 0.6 MG/DL (ref 0–1.2)
BUN SERPL-MCNC: 26 MG/DL (ref 8–23)
BUN/CREAT SERPL: 28.9 (ref 7–25)
CALCIUM SPEC-SCNC: 9.4 MG/DL (ref 8.6–10.5)
CHLORIDE SERPL-SCNC: 102 MMOL/L (ref 98–107)
CHOLEST SERPL-MCNC: 168 MG/DL (ref 0–200)
CO2 SERPL-SCNC: 27 MMOL/L (ref 22–29)
CREAT SERPL-MCNC: 0.9 MG/DL (ref 0.76–1.27)
DEPRECATED RDW RBC AUTO: 43.1 FL (ref 37–54)
EGFRCR SERPLBLD CKD-EPI 2021: 90.7 ML/MIN/1.73
EOSINOPHIL # BLD AUTO: 0.29 10*3/MM3 (ref 0–0.4)
EOSINOPHIL NFR BLD AUTO: 6.2 % (ref 0.3–6.2)
ERYTHROCYTE [DISTWIDTH] IN BLOOD BY AUTOMATED COUNT: 12.3 % (ref 12.3–15.4)
GLOBULIN UR ELPH-MCNC: 2.6 GM/DL
GLUCOSE SERPL-MCNC: 96 MG/DL (ref 65–99)
HCT VFR BLD AUTO: 45.3 % (ref 37.5–51)
HDLC SERPL-MCNC: 49 MG/DL (ref 40–60)
HGB BLD-MCNC: 15.1 G/DL (ref 13–17.7)
IMM GRANULOCYTES # BLD AUTO: 0.01 10*3/MM3 (ref 0–0.05)
IMM GRANULOCYTES NFR BLD AUTO: 0.2 % (ref 0–0.5)
LDLC SERPL CALC-MCNC: 104 MG/DL (ref 0–100)
LDLC/HDLC SERPL: 2.11 {RATIO}
LYMPHOCYTES # BLD AUTO: 1.44 10*3/MM3 (ref 0.7–3.1)
LYMPHOCYTES NFR BLD AUTO: 30.8 % (ref 19.6–45.3)
MCH RBC QN AUTO: 31.7 PG (ref 26.6–33)
MCHC RBC AUTO-ENTMCNC: 33.3 G/DL (ref 31.5–35.7)
MCV RBC AUTO: 95 FL (ref 79–97)
MONOCYTES # BLD AUTO: 0.42 10*3/MM3 (ref 0.1–0.9)
MONOCYTES NFR BLD AUTO: 9 % (ref 5–12)
NEUTROPHILS NFR BLD AUTO: 2.45 10*3/MM3 (ref 1.7–7)
NEUTROPHILS NFR BLD AUTO: 52.5 % (ref 42.7–76)
NRBC BLD AUTO-RTO: 0 /100 WBC (ref 0–0.2)
PLATELET # BLD AUTO: 166 10*3/MM3 (ref 140–450)
PMV BLD AUTO: 12.6 FL (ref 6–12)
POTASSIUM SERPL-SCNC: 3.8 MMOL/L (ref 3.5–5.2)
PROT SERPL-MCNC: 6.9 G/DL (ref 6–8.5)
PSA SERPL-MCNC: 1.28 NG/ML (ref 0–4)
RBC # BLD AUTO: 4.77 10*6/MM3 (ref 4.14–5.8)
SODIUM SERPL-SCNC: 138 MMOL/L (ref 136–145)
TRIGL SERPL-MCNC: 79 MG/DL (ref 0–150)
VLDLC SERPL-MCNC: 15 MG/DL (ref 5–40)
WBC NRBC COR # BLD: 4.67 10*3/MM3 (ref 3.4–10.8)

## 2022-12-14 PROCEDURE — G0439 PPPS, SUBSEQ VISIT: HCPCS | Performed by: INTERNAL MEDICINE

## 2022-12-14 PROCEDURE — 80053 COMPREHEN METABOLIC PANEL: CPT | Performed by: INTERNAL MEDICINE

## 2022-12-14 PROCEDURE — 1170F FXNL STATUS ASSESSED: CPT | Performed by: INTERNAL MEDICINE

## 2022-12-14 PROCEDURE — 82306 VITAMIN D 25 HYDROXY: CPT | Performed by: INTERNAL MEDICINE

## 2022-12-14 PROCEDURE — G0103 PSA SCREENING: HCPCS | Performed by: INTERNAL MEDICINE

## 2022-12-14 PROCEDURE — 85025 COMPLETE CBC W/AUTO DIFF WBC: CPT | Performed by: INTERNAL MEDICINE

## 2022-12-14 PROCEDURE — 99213 OFFICE O/P EST LOW 20 MIN: CPT | Performed by: INTERNAL MEDICINE

## 2022-12-14 PROCEDURE — 1160F RVW MEDS BY RX/DR IN RCRD: CPT | Performed by: INTERNAL MEDICINE

## 2022-12-14 PROCEDURE — 80061 LIPID PANEL: CPT | Performed by: INTERNAL MEDICINE

## 2022-12-14 PROCEDURE — 36415 COLL VENOUS BLD VENIPUNCTURE: CPT | Performed by: INTERNAL MEDICINE

## 2022-12-14 RX ORDER — AMLODIPINE BESYLATE 2.5 MG/1
2.5 TABLET ORAL DAILY
Qty: 90 TABLET | Refills: 3 | Status: SHIPPED | OUTPATIENT
Start: 2022-12-14

## 2022-12-14 NOTE — PROGRESS NOTES
The ABCs of the Annual Wellness Visit  Subsequent Medicare Wellness Visit    Subjective    Daren Zambrano is a 72 y.o. male who presents for a Subsequent Medicare Wellness Visit.      The following portions of the patient's history were reviewed and   updated as appropriate: allergies, current medications, past family history, past medical history, past social history, past surgical history and problem list.    Compared to one year ago, the patient feels his physical   health is worse.    Compared to one year ago, the patient feels his mental   health is the same.    Recent Hospitalizations:  He was not admitted to the hospital during the last year.       Current Medical Providers:  Patient Care Team:  Mei Kimball MD as PCP - General (Internal Medicine)    Outpatient Medications Prior to Visit   Medication Sig Dispense Refill   • aspirin 81 MG EC tablet Take 81 mg by mouth Daily. Stop 7-22-19 for surgery     • celecoxib (CeleBREX) 200 MG capsule Take 1 capsule by mouth 2 (Two) Times a Day. Stop 7-22-19 for surgery 180 capsule 3   • losartan-hydrochlorothiazide (HYZAAR) 100-25 MG per tablet TAKE 1 TABLET EVERY DAY 90 tablet 1   • Multiple Vitamins-Minerals (MULTIVITAMIN WITH MINERALS) tablet tablet Take 1 tablet by mouth Daily. Stop 7-22-19 for surgery     • Omega-3 Fatty Acids (FISH OIL OMEGA-3 PO) Take 1 tablet by mouth Daily. 1000/300mg.  Stop 7-22-19 for surgery     • vitamin E 100 UNIT capsule Take 400 Units by mouth Daily. Stop 7-22-19 for surgery     • amLODIPine (NORVASC) 2.5 MG tablet Take 1 tablet by mouth Daily. 90 tablet 3     No facility-administered medications prior to visit.       No opioid medication identified on active medication list. I have reviewed chart for other potential  high risk medication/s and harmful drug interactions in the elderly.          Aspirin is on active medication list. Aspirin use is indicated based on review of current medical condition/s. Pros and cons of this  "therapy have been discussed today. Benefits of this medication outweigh potential harm.  Patient has been encouraged to continue taking this medication.  .      Patient Active Problem List   Diagnosis   • Spinal stenosis   • Osteoarthritis   • Sciatica   • Transition of care performed with sharing of clinical summary   • Primary osteoarthritis of left hip   • Medicare annual wellness visit, subsequent   • Hypertension   • Encounter for general adult medical examination without abnormal findings   • Synovial cyst of popliteal space   • Primary osteoarthritis of left hip   • Vitamin D deficiency   • Elevated lipoprotein(a)   • Screening for prostate cancer   • Squamous cell carcinoma of skin of left lower extremity   • COVID-19 vaccine administered   • Screening for colon cancer   • Low back pain   • Essential hypertension     Advance Care Planning  Advance Directive is not on file.  ACP discussion was held with the patient during this visit. Patient does not have an advance directive, declines further assistance.     Objective    Vitals:    22 0825   BP: 103/67   Pulse: 59   Resp: 16   Temp: 97.1 °F (36.2 °C)   SpO2: 97%   Weight: 93 kg (205 lb)   Height: 180.3 cm (71\")     Estimated body mass index is 28.59 kg/m² as calculated from the following:    Height as of this encounter: 180.3 cm (71\").    Weight as of this encounter: 93 kg (205 lb).    BMI is >= 25 and <30. (Overweight) The following options were offered after discussion;: exercise counseling/recommendations and nutrition counseling/recommendations      Does the patient have evidence of cognitive impairment? No    Lab Results   Component Value Date    TRIG 79 2022    HDL 49 2022     (H) 2022    VLDL 15 2022        HEALTH RISK ASSESSMENT    Smoking Status:  Social History     Tobacco Use   Smoking Status Former   • Types: Cigarettes   • Quit date: 7/10/1977   • Years since quittin.4   Smokeless Tobacco Never "     Alcohol Consumption:  Social History     Substance and Sexual Activity   Alcohol Use Yes   • Alcohol/week: 1.0 standard drink   • Types: 1 Cans of beer per week    Comment: Occ.     Fall Risk Screen:    SARAH Fall Risk Assessment was completed, and patient is at LOW risk for falls.Assessment completed on:12/14/2022    Depression Screening:  PHQ-2/PHQ-9 Depression Screening 12/14/2022   Retired PHQ-9 Total Score -   Retired Total Score -   Little Interest or Pleasure in Doing Things 0-->not at all   Feeling Down, Depressed or Hopeless 0-->not at all   PHQ-9: Brief Depression Severity Measure Score 0       Health Habits and Functional and Cognitive Screening:  Functional & Cognitive Status 12/14/2022   Do you have difficulty preparing food and eating? No   Do you have difficulty bathing yourself, getting dressed or grooming yourself? No   Do you have difficulty using the toilet? No   Do you have difficulty moving around from place to place? No   Do you have trouble with steps or getting out of a bed or a chair? No   Current Diet Well Balanced Diet   Dental Exam Up to date   Eye Exam Up to date   Exercise (times per week) 0 times per week   Current Exercises Include No Regular Exercise   Current Exercise Activities Include -   Do you need help using the phone?  No   Are you deaf or do you have serious difficulty hearing?  No   Do you need help with transportation? No   Do you need help shopping? No   Do you need help preparing meals?  No   Do you need help with housework?  No   Do you need help with laundry? No   Do you need help taking your medications? No   Do you need help managing money? No   Do you ever drive or ride in a car without wearing a seat belt? No   Have you felt unusual stress, anger or loneliness in the last month? No   Who do you live with? Spouse   If you need help, do you have trouble finding someone available to you? No   Have you been bothered in the last four weeks by sexual problems? -        Age-appropriate Screening Schedule:  Refer to the list below for future screening recommendations based on patient's age, sex and/or medical conditions. Orders for these recommended tests are listed in the plan section. The patient has been provided with a written plan.    Health Maintenance   Topic Date Due   • LIPID PANEL  12/14/2023   • TDAP/TD VACCINES (3 - Td or Tdap) 06/01/2032   • INFLUENZA VACCINE  Completed   • ZOSTER VACCINE  Completed                CMS Preventative Services Quick Reference  Risk Factors Identified During Encounter  Hearing Problem:   The above risks/problems have been discussed with the patient.  Pertinent information has been shared with the patient in the After Visit Summary.  An After Visit Summary and PPPS were made available to the patient.    Follow Up:   Next Medicare Wellness visit to be scheduled in 1 year.       Additional E&M Note during same encounter follows:  Patient has multiple medical problems which are significant and separately identifiable that require additional work above and beyond the Medicare Wellness Visit.      Chief Complaint  Medicare Wellness-subsequent, Back Pain, and Hypertension    Subjective        HPI  Daren Zambrano is also being seen today for annual exam.      Dupuytren's contracture  The patient may have his finger straightened. He states that it is constantly curved. He has an appointment on 12/17/2022. He has tried injections in the past, which did not help.    Knee arthritis  He has a little pain on the right side, especially on the right side.    Hearing loss  He reports that his hearing is doing okay. He notes his wife has told him that his hearing is worse but it is not threatening for him to get hearing aids.    Cataracts  He sees an eye doctor once a year. The patient had cataract surgery on both eyes and YAG surgery after that. He admits double vision before the cataract surgery, but that went away. He has no issues with pressure or  glaucoma. He notes they mentioned a sign of aging in the left eye, but they did not think it was glaucoma. He notes his father had macular degeneration.    Lower back pain  He admits his lower back may be a bit worse. He has more numbness all the time. He has some numbness on both legs, but especially the left but manages to do things. He plays golf more than he has played in his whole life. The patient went deer hunting last year. He climbed up into the stand twice a day for 3 days. He had to drag the deer to where he could get in his tractor. He does not do his exercises most days but remembers them from physical therapy and does them occasionally. He reports that there are periods where he knows he cannot stretch because it is hurting too bad. He is not going to do the pain injections. He was incredibly active right after. The patient went to [place] one time with his grandchildren and it about killed him after 3 days of that. He had a 5-day pass and he was done. He admits that he is sitting.    Hypertension  He needs a refill on his amlodipine. The patient does not feel like it is making his ankle swell. He is still taking Celebrex 2 a day and tries to take it with food. The patient does not feel like he is having heartburn very often. He did not eat this morning. He does not want to take the hormone. He denies any issues with depression or anxiety. He is no longer taking vitamin D. He is just taking E and omega.     Health maintenance  He received the booster on 10/27/2022. He has had his influenza vaccination. The patient received his tetanus vaccination this year as well. He reports that he had his colonoscopy. He has smoked more than 100 cigarettes in his life. He has not had an ultrasound of his aorta. The patient does not want to check his testosterone. He does not walk in the rain. The patient burned himself eating some hot food. He does have a patch that he has had for 1 to 2 months. He denies any  "abdominal pain, constipation, or diarrhea.           Objective   Vital Signs:  /67   Pulse 59   Temp 97.1 °F (36.2 °C)   Resp 16   Ht 180.3 cm (71\")   Wt 93 kg (205 lb)   SpO2 97%   BMI 28.59 kg/m²     Physical Exam  Vitals and nursing note reviewed.   Constitutional:       Appearance: Normal appearance. He is well-developed.   HENT:      Head: Normocephalic and atraumatic.      Right Ear: Tympanic membrane normal.      Left Ear: Tympanic membrane normal.      Nose: No rhinorrhea.      Mouth/Throat:      Pharynx: No posterior oropharyngeal erythema.   Eyes:      Pupils: Pupils are equal, round, and reactive to light.   Cardiovascular:      Rate and Rhythm: Normal rate and regular rhythm.      Pulses: Normal pulses.      Heart sounds: Normal heart sounds. No murmur heard.  Pulmonary:      Effort: Pulmonary effort is normal.      Breath sounds: Normal breath sounds.   Abdominal:      General: Bowel sounds are normal. There is no distension.      Palpations: Abdomen is soft.   Musculoskeletal:         General: No tenderness.      Cervical back: Normal range of motion and neck supple.   Skin:     Capillary Refill: Capillary refill takes less than 2 seconds.   Neurological:      Mental Status: He is alert and oriented to person, place, and time.   Psychiatric:         Mood and Affect: Mood normal.         Behavior: Behavior normal.          The following data was reviewed by: Mei Kimball MD on 12/14/2022:  CMP    CMP 6/8/22 12/14/22   Glucose 88 96   BUN 29 (A) 26 (A)   Creatinine 0.87 0.90   Sodium 137 138   Potassium 3.8 3.8   Chloride 100 102   Calcium 9.6 9.4   Albumin 4.60 4.30   Total Bilirubin 0.4 0.6   Alkaline Phosphatase 73 78   AST (SGOT) 32 26   ALT (SGPT) 27 25   (A) Abnormal value            CBC    CBC 12/14/22   WBC 4.67   RBC 4.77   Hemoglobin 15.1   Hematocrit 45.3   MCV 95.0   MCH 31.7   MCHC 33.3   RDW 12.3   Platelets 166           Lipid Panel    Lipid Panel 6/8/22 12/14/22 "   Total Cholesterol 168 168   Triglycerides 41 79   HDL Cholesterol 53 49   VLDL Cholesterol 9 15   LDL Cholesterol  106 (A) 104 (A)   LDL/HDL Ratio 2.02 2.11   (A) Abnormal value            Data reviewed: Radiologic studies MRI           Assessment and Plan   Diagnoses and all orders for this visit:    1. Medicare annual wellness visit, subsequent (Primary)  Comments:  discussed all recommendations  Orders:  -     Abdominal Aortic Aneurysm Screening Medicare CAR; Future    2. Primary osteoarthritis of left hip    3. Primary hypertension  -     Abdominal Aortic Aneurysm Screening Medicare CAR; Future  -     Comprehensive Metabolic Panel  -     CBC Auto Differential    4. Elevated lipoprotein(a)  -     Lipid Panel    5. Spinal stenosis of lumbar region, unspecified whether neurogenic claudication present  Comments:  work on stretches 2-3/week    6. Essential hypertension  -     amLODIPine (NORVASC) 2.5 MG tablet; Take 1 tablet by mouth Daily.  Dispense: 90 tablet; Refill: 3    7. Vitamin D deficiency  -     Vitamin D,25-Hydroxy    8. Screening for prostate cancer  -     PSA Screen    1. Medicare annual wellness visit, subsequent (Primary)  - We will obtain blood work today.  - He will follow up in 1 year for his Medicare wellness visit.    2. Essential hypertension  - He will continue to monitor his blood pressure at home.  - He will follow up in 6 months.    3. Mixed hyperlipidemia  - We will obtain blood work today.    4. Vitamin D deficiency  - We will obtain blood work today.         I spent 40 minutes caring for Daren on this date of service. This time includes time spent by me in the following activities:reviewing tests, performing a medically appropriate examination and/or evaluation  and counseling and educating the patient/family/caregiver  Follow Up   Return in about 6 months (around 6/14/2023), or if symptoms worsen or fail to improve, for Medicare Wellness, Recheck.  Patient was given instructions and  counseling regarding his condition or for health maintenance advice. Please see specific information pulled into the AVS if appropriate.       Transcribed from ambient dictation for Mei Kimball MD by Sera Boston.  12/14/22   10:32 EST    Patient or patient representative verbalized consent to the visit recording.  I have personally performed the services described in this document as transcribed by the above individual, and it is both accurate and complete.  Mei Kimball MD  12/14/2022  22:16 EST

## 2022-12-22 ENCOUNTER — TELEPHONE (OUTPATIENT)
Dept: FAMILY MEDICINE CLINIC | Facility: CLINIC | Age: 72
End: 2022-12-22

## 2022-12-22 DIAGNOSIS — Z00.00 MEDICARE ANNUAL WELLNESS VISIT, SUBSEQUENT: ICD-10-CM

## 2022-12-22 DIAGNOSIS — I10 PRIMARY HYPERTENSION: Primary | ICD-10-CM

## 2022-12-22 NOTE — TELEPHONE ENCOUNTER
Financial called to let us know that the [place] aaa screen limited - needs new diagnosis codes.  The ones used are not Medicare compliant.

## 2022-12-22 NOTE — TELEPHONE ENCOUNTER
Linda with [facility] called back again and said the diagnosis you used this morning is still not compliant.  It must be Z13.6 or F45675.

## 2022-12-28 ENCOUNTER — HOSPITAL ENCOUNTER (OUTPATIENT)
Dept: ULTRASOUND IMAGING | Facility: HOSPITAL | Age: 72
Discharge: HOME | End: 2022-12-28
Admitting: INTERNAL MEDICINE

## 2022-12-28 DIAGNOSIS — Z00.00 MEDICARE ANNUAL WELLNESS VISIT, SUBSEQUENT: ICD-10-CM

## 2022-12-28 DIAGNOSIS — I10 PRIMARY HYPERTENSION: ICD-10-CM

## 2022-12-28 PROCEDURE — 76706 US ABDL AORTA SCREEN AAA: CPT

## 2023-03-14 RX ORDER — CELECOXIB 200 MG/1
CAPSULE ORAL
Qty: 180 CAPSULE | Refills: 3 | Status: SHIPPED | OUTPATIENT
Start: 2023-03-14

## 2023-06-14 ENCOUNTER — LAB (OUTPATIENT)
Dept: FAMILY MEDICINE CLINIC | Facility: CLINIC | Age: 73
End: 2023-06-14
Payer: MEDICARE

## 2023-06-14 ENCOUNTER — OFFICE VISIT (OUTPATIENT)
Dept: FAMILY MEDICINE CLINIC | Facility: CLINIC | Age: 73
End: 2023-06-14
Payer: MEDICARE

## 2023-06-14 VITALS
DIASTOLIC BLOOD PRESSURE: 72 MMHG | HEIGHT: 71 IN | HEART RATE: 55 BPM | RESPIRATION RATE: 16 BRPM | WEIGHT: 208.4 LBS | BODY MASS INDEX: 29.18 KG/M2 | SYSTOLIC BLOOD PRESSURE: 124 MMHG | OXYGEN SATURATION: 98 %

## 2023-06-14 DIAGNOSIS — M54.50 CHRONIC BILATERAL LOW BACK PAIN WITHOUT SCIATICA: ICD-10-CM

## 2023-06-14 DIAGNOSIS — I10 PRIMARY HYPERTENSION: ICD-10-CM

## 2023-06-14 DIAGNOSIS — E55.9 VITAMIN D DEFICIENCY: ICD-10-CM

## 2023-06-14 DIAGNOSIS — E78.41 ELEVATED LIPOPROTEIN(A): ICD-10-CM

## 2023-06-14 DIAGNOSIS — G89.29 CHRONIC BILATERAL LOW BACK PAIN WITHOUT SCIATICA: ICD-10-CM

## 2023-06-14 DIAGNOSIS — E78.41 ELEVATED LIPOPROTEIN(A): Primary | ICD-10-CM

## 2023-06-14 PROBLEM — H35.54 RPE (RETINAL PIGMENT EPITHELIUM) ATROPHY: Status: ACTIVE | Noted: 2021-01-25

## 2023-06-14 PROBLEM — H33.312 HORSESHOE RETINAL TEAR, LEFT EYE: Status: ACTIVE | Noted: 2023-05-25

## 2023-06-14 PROBLEM — H43.12 VITREOUS HEMORRHAGE OF LEFT EYE: Status: ACTIVE | Noted: 2023-05-25

## 2023-06-14 PROBLEM — H35.89 MACULAR RPE MOTTLING: Status: ACTIVE | Noted: 2022-12-05

## 2023-06-14 PROBLEM — Z96.1 PSEUDOPHAKIA OF LEFT EYE: Status: ACTIVE | Noted: 2021-02-19

## 2023-06-14 PROBLEM — H43.819 POSTERIOR VITREOUS DETACHMENT: Status: ACTIVE | Noted: 2022-12-05

## 2023-06-14 PROBLEM — H50.9 STRABISMUS: Status: ACTIVE | Noted: 2021-03-29

## 2023-06-14 PROBLEM — H26.493 PCO (POSTERIOR CAPSULAR OPACIFICATION), BILATERAL: Status: ACTIVE | Noted: 2021-03-29

## 2023-06-14 PROBLEM — H35.3190 NONEXUDATIVE AGE-RELATED MACULAR DEGENERATION: Status: ACTIVE | Noted: 2021-01-25

## 2023-06-14 PROBLEM — H35.373 EPIRETINAL MEMBRANE (ERM) OF BOTH EYES: Status: ACTIVE | Noted: 2022-12-05

## 2023-06-14 LAB
25(OH)D3 SERPL-MCNC: 34.8 NG/ML (ref 30–100)
ALBUMIN SERPL-MCNC: 4.6 G/DL (ref 3.5–5.2)
ALBUMIN/GLOB SERPL: 1.7 G/DL
ALP SERPL-CCNC: 68 U/L (ref 39–117)
ALT SERPL W P-5'-P-CCNC: 25 U/L (ref 1–41)
ANION GAP SERPL CALCULATED.3IONS-SCNC: 10.8 MMOL/L (ref 5–15)
AST SERPL-CCNC: 28 U/L (ref 1–40)
BILIRUB SERPL-MCNC: 0.6 MG/DL (ref 0–1.2)
BUN SERPL-MCNC: 24 MG/DL (ref 8–23)
BUN/CREAT SERPL: 27.9 (ref 7–25)
CALCIUM SPEC-SCNC: 10.4 MG/DL (ref 8.6–10.5)
CHLORIDE SERPL-SCNC: 101 MMOL/L (ref 98–107)
CHOLEST SERPL-MCNC: 177 MG/DL (ref 0–200)
CO2 SERPL-SCNC: 26.2 MMOL/L (ref 22–29)
CREAT SERPL-MCNC: 0.86 MG/DL (ref 0.76–1.27)
EGFRCR SERPLBLD CKD-EPI 2021: 91.4 ML/MIN/1.73
GLOBULIN UR ELPH-MCNC: 2.7 GM/DL
GLUCOSE SERPL-MCNC: 101 MG/DL (ref 65–99)
HDLC SERPL-MCNC: 48 MG/DL (ref 40–60)
LDLC SERPL CALC-MCNC: 111 MG/DL (ref 0–100)
LDLC/HDLC SERPL: 2.28 {RATIO}
POTASSIUM SERPL-SCNC: 3.9 MMOL/L (ref 3.5–5.2)
PROT SERPL-MCNC: 7.3 G/DL (ref 6–8.5)
SODIUM SERPL-SCNC: 138 MMOL/L (ref 136–145)
TRIGL SERPL-MCNC: 98 MG/DL (ref 0–150)
VLDLC SERPL-MCNC: 18 MG/DL (ref 5–40)

## 2023-06-14 PROCEDURE — 3074F SYST BP LT 130 MM HG: CPT | Performed by: INTERNAL MEDICINE

## 2023-06-14 PROCEDURE — 3078F DIAST BP <80 MM HG: CPT | Performed by: INTERNAL MEDICINE

## 2023-06-14 PROCEDURE — 1160F RVW MEDS BY RX/DR IN RCRD: CPT | Performed by: INTERNAL MEDICINE

## 2023-06-14 PROCEDURE — 99214 OFFICE O/P EST MOD 30 MIN: CPT | Performed by: INTERNAL MEDICINE

## 2023-06-14 PROCEDURE — 36415 COLL VENOUS BLD VENIPUNCTURE: CPT

## 2023-06-14 PROCEDURE — 82306 VITAMIN D 25 HYDROXY: CPT | Performed by: INTERNAL MEDICINE

## 2023-06-14 PROCEDURE — 80053 COMPREHEN METABOLIC PANEL: CPT | Performed by: INTERNAL MEDICINE

## 2023-06-14 PROCEDURE — 1159F MED LIST DOCD IN RCRD: CPT | Performed by: INTERNAL MEDICINE

## 2023-06-14 PROCEDURE — 80061 LIPID PANEL: CPT | Performed by: INTERNAL MEDICINE

## 2023-06-14 RX ORDER — LOSARTAN POTASSIUM AND HYDROCHLOROTHIAZIDE 25; 100 MG/1; MG/1
1 TABLET ORAL DAILY
Qty: 90 TABLET | Refills: 3 | Status: SHIPPED | OUTPATIENT
Start: 2023-06-14

## 2023-06-14 RX ORDER — METHYLPREDNISOLONE 4 MG/1
TABLET ORAL
Qty: 21 EACH | Refills: 0 | Status: SHIPPED | OUTPATIENT
Start: 2023-06-14

## 2023-06-14 NOTE — PROGRESS NOTES
"Rooming Tab(CC,VS,Pt Hx,Fall Screen)  Chief Complaint   Patient presents with    Follow-up       Subjective      Daren Zambrano is a 73-year-old male who presents for follow-up.    The patient has been trying to play golf a little bit and it is a Tuesday and Friday regimen to get his body healing down in between. He had some trouble when he started doing some of the yard work, especially in spring. He was down for about a month when he was not doing much of anything, but he got back.     He is still taking Celebrex twice a day. He is trying to do his stretches in the morning when his back allows it. When he is bad, it is painful for him to stretch. When hiking he would sit down and rest his back seems to come back again after about a 10-minute rest. He can go on for another mile and a half or so. He never does more than 800 or 1,000 feet of incline. Both of his hips are painful constantly. He feels pain when he goes up even on the roads at home. His last epidural in his back was in . They did not improve his symptoms.     The patient needs a refill on his losartan. He denies any chest pain.      I have reviewed and updated his medications, medical history and problem list during today's office visit.     Patient Care Team:  Mei Kimball MD as PCP - General (Internal Medicine)    Problem List Tab  Medications Tab  Synopsis Tab  Chart Review Tab  Care Everywhere Tab  Immunizations Tab  Patient History Tab    Social History     Tobacco Use    Smoking status: Former     Types: Cigarettes     Quit date: 7/10/1977     Years since quittin.9    Smokeless tobacco: Never   Substance Use Topics    Alcohol use: Yes     Alcohol/week: 1.0 standard drink     Types: 1 Cans of beer per week     Comment: Occ.       Review of Systems    Objective     Rooming Tab(CC,VS,Pt Hx,Fall Screen)  /72 (BP Location: Left arm, Patient Position: Sitting, Cuff Size: Adult)   Pulse 55   Resp 16   Ht 180.3 cm (71\")   Wt " 94.5 kg (208 lb 6.4 oz)   SpO2 98%   BMI 29.07 kg/m²     Body mass index is 29.07 kg/m².    Physical Exam  Vitals and nursing note reviewed.   Constitutional:       Appearance: Normal appearance. He is well-developed.   HENT:      Head: Normocephalic and atraumatic.      Right Ear: Tympanic membrane normal.      Left Ear: Tympanic membrane normal.      Nose: No rhinorrhea.      Mouth/Throat:      Pharynx: No posterior oropharyngeal erythema.   Eyes:      Pupils: Pupils are equal, round, and reactive to light.   Cardiovascular:      Rate and Rhythm: Normal rate and regular rhythm.      Pulses: Normal pulses.      Heart sounds: Normal heart sounds. No murmur heard.  Pulmonary:      Effort: Pulmonary effort is normal.      Breath sounds: Normal breath sounds.   Abdominal:      General: Bowel sounds are normal. There is no distension.      Palpations: Abdomen is soft.   Musculoskeletal:         General: Tenderness present.      Cervical back: Normal range of motion and neck supple.   Skin:     Capillary Refill: Capillary refill takes less than 2 seconds.   Neurological:      Mental Status: He is alert and oriented to person, place, and time.   Psychiatric:         Mood and Affect: Mood normal.         Behavior: Behavior normal.        Statin Choice Calculator  Data Reviewed:         The data below has been reviewed by Mei Kimball MD on 06/14/2023.  Lab Results - Last 18 Months   Lab Units 06/14/23  0827 12/14/22  0910 06/08/22  1027   BUN mg/dL 24* 26* 29*   CREATININE mg/dL 0.86 0.90 0.87   SODIUM mmol/L 138 138 137   POTASSIUM mmol/L 3.9 3.8 3.8   CHLORIDE mmol/L 101 102 100   CALCIUM mg/dL 10.4 9.4 9.6   ALBUMIN g/dL 4.6 4.30 4.60   BILIRUBIN mg/dL 0.6 0.6 0.4   ALK PHOS U/L 68 78 73   AST (SGOT) U/L 28 26 32   ALT (SGPT) U/L 25 25 27   TRIGLYCERIDES mg/dL 98 79 41   HDL CHOL mg/dL 48 49 53   VLDL CHOL mg/dL 18 15 9   LDL CHOL mg/dL 111* 104* 106*   LDL/HDL RATIO  2.28 2.11 2.02   WBC 10*3/mm3  --  4.67   --    RBC 10*6/mm3  --  4.77  --    HEMATOCRIT %  --  45.3  --    MCV fL  --  95.0  --    MCH pg  --  31.7  --    PSA ng/mL  --  1.280  --    VIT D 25 HYDROXY ng/ml 34.8 43.3  --          Lab Results   Component Value Date    BUN 24 (H) 06/14/2023    CREATININE 0.86 06/14/2023     06/14/2023    K 3.9 06/14/2023     06/14/2023    CALCIUM 10.4 06/14/2023    ALBUMIN 4.6 06/14/2023    BILITOT 0.6 06/14/2023    ALKPHOS 68 06/14/2023    AST 28 06/14/2023    ALT 25 06/14/2023    TRIG 98 06/14/2023    HDL 48 06/14/2023    VLDL 18 06/14/2023     (H) 06/14/2023    LDLHDL 2.28 06/14/2023    [id number] 34.8 06/14/2023      Assessment & Plan   Order Review Tab  Health Maintenance Tab  Patient Plan/Order Tab  Diagnoses and all orders for this visit:    1. Elevated lipoprotein(a) (Primary)  -     Comprehensive metabolic panel; Future  -     Lipid panel; Future    2. Vitamin D deficiency  -     Vitamin D 25 hydroxy; Future    3. Primary hypertension    4. Chronic bilateral low back pain without sciatica  Comments:  doing poorly-    Other orders  -     methylPREDNISolone (MEDROL) 4 MG dose pack; Take as directed on package instructions.  Dispense: 21 each; Refill: 0  -     losartan-hydrochlorothiazide (HYZAAR) 100-25 MG per tablet; Take 1 tablet by mouth Daily.  Dispense: 90 tablet; Refill: 3        Wrapup Tab  Return in about 6 months (around 12/14/2023), or if symptoms worsen or fail to improve.       They were informed of the diagnosis and treatment plan and directed to f/u for any further problems or concerns.          Transcribed from ambient dictation for Mei Kimball MD by Letty Valentine.  06/14/23   09:49 EDT    Patient or patient representative verbalized consent to the visit recording.  I have personally performed the services described in this document as transcribed by the above individual, and it is both accurate and complete.  Mei Kimball MD  6/16/2023  21:30 EDT

## 2023-06-15 NOTE — PROGRESS NOTES
Overall all labs were normal- cholesterol has increased slightly- but still ok. Kidneys looked dehydrated- but you were fasting. Make sure drinking lots of water- especially when playing golf and outside

## 2023-09-07 ENCOUNTER — OFFICE VISIT (OUTPATIENT)
Dept: FAMILY MEDICINE CLINIC | Facility: CLINIC | Age: 73
End: 2023-09-07
Payer: MEDICARE

## 2023-09-07 VITALS
BODY MASS INDEX: 29.65 KG/M2 | WEIGHT: 211.8 LBS | HEIGHT: 71 IN | DIASTOLIC BLOOD PRESSURE: 74 MMHG | OXYGEN SATURATION: 98 % | SYSTOLIC BLOOD PRESSURE: 114 MMHG | RESPIRATION RATE: 16 BRPM | HEART RATE: 65 BPM

## 2023-09-07 DIAGNOSIS — C44.729 SQUAMOUS CELL CARCINOMA OF SKIN OF LEFT LOWER EXTREMITY: ICD-10-CM

## 2023-09-07 DIAGNOSIS — E78.41 ELEVATED LIPOPROTEIN(A): ICD-10-CM

## 2023-09-07 DIAGNOSIS — H33.312 HORSESHOE RETINAL TEAR, LEFT EYE: ICD-10-CM

## 2023-09-07 DIAGNOSIS — M48.061 SPINAL STENOSIS OF LUMBAR REGION, UNSPECIFIED WHETHER NEUROGENIC CLAUDICATION PRESENT: ICD-10-CM

## 2023-09-07 DIAGNOSIS — I10 PRIMARY HYPERTENSION: Primary | ICD-10-CM

## 2023-09-07 NOTE — PATIENT INSTRUCTIONS
Continue current medications as prescribed    Encourage healthy diet and exercise    Continue activity modification and back exercises as able    Follow up for Medicare Wellness visit around 12/15/2023

## 2023-09-07 NOTE — PROGRESS NOTES
Chief Complaint  No chief complaint on file.    HPI:    Daren Zambrano presents to [facility]    Hypertension  Blood pressure has been well controlle on Losartan-hydrochlorothiazide 100-25 mg daily and amlodipine 2.5 mg daily.  Most recently had labs on 6/14/2023.    Hyperlipidemia  Not currently on statin and has not been previously.     Spinal stenosis  He has had about month periods where he has had significant pain which limited his ADL. He has modified his activity and tries not to lift and strain as much. Still able to play golf. Currently on celebrex. He has previously tried injections and PT. He tries to stretch as well. Previously had spine surgery 2019.     Multiple eye pathologies including macular degeneration, vitreous detachment, pseudophakia. He follows with eye specialist and has been following more recently due to torn retina. Vision recently has been good. Follow up appointment in October.     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  Status post prior removal.  Was due to see dermatology this spring, although provider was out for maternity leave.    Due for Medicare wellness visit in 12/2023    Review of Systems:  ROS negative unless otherwise noted in HPI above.    Past Medical History:   Diagnosis Date    Clavicle fracture     Hypertension     Injury of back     stenosis    Low back pain     Osteoarthritis     Slow to wake up after anesthesia          Current Outpatient Medications:     amLODIPine (NORVASC) 2.5 MG tablet, Take 1 tablet by mouth Daily., Disp: 90 tablet, Rfl: 3    aspirin 81 MG EC tablet, Take 1 tablet by mouth Daily. Stop 7-22-19 for surgery, Disp: , Rfl:     celecoxib (CeleBREX) 200 MG capsule, TAKE 1 CAPSULE TWICE DAILY, Disp: 180 capsule, Rfl: 3    losartan-hydrochlorothiazide (HYZAAR) 100-25 MG per tablet, Take 1 tablet by mouth Daily., Disp: 90 tablet, Rfl: 3    methylPREDNISolone (MEDROL) 4 MG dose pack, Take as directed on  "package instructions., Disp: 21 each, Rfl: 0    Multiple Vitamins-Minerals (MULTIVITAMIN WITH MINERALS) tablet tablet, Take 1 tablet by mouth Daily. Stop 19 for surgery, Disp: , Rfl:     Omega-3 Fatty Acids (FISH OIL OMEGA-3 PO), Take 1 tablet by mouth Daily. 1000/300mg.  Stop 19 for surgery, Disp: , Rfl:     vitamin E 100 UNIT capsule, Take 4 capsules by mouth Daily. Stop 19 for surgery, Disp: , Rfl:     Social History     Socioeconomic History    Marital status:    Tobacco Use    Smoking status: Former     Types: Cigarettes     Quit date: 7/10/1977     Years since quittin.1    Smokeless tobacco: Never   Vaping Use    Vaping Use: Never used   Substance and Sexual Activity    Alcohol use: Yes     Alcohol/week: 1.0 standard drink     Types: 1 Cans of beer per week     Comment: Occ.    Drug use: No    Sexual activity: Yes        Objective   Vital Signs:  There were no vitals taken for this visit.  Estimated body mass index is 29.07 kg/m² as calculated from the following:    Height as of 23: 180.3 cm (71\").    Weight as of 23: 94.5 kg (208 lb 6.4 oz).    Physical Exam:  General: Well-appearing patient, no apparent distress  HEENT: No posterior pharynx erythema, no tonsillar erythema or exudates,   Cardiac: Regular rate and rhythm, normal S1/S2, no murmur, rubs or gallops, no lower extremity edema  Lungs: Clear to auscultation bilaterally, no crackles or wheezes  Skin: No significant rashes or lesions  MSK: Grossly normal tone and strength, slight atrophy of the right calf, mild tenderness to palpation of left lumbar paraspinal muscles, 5 out of 5 strength in lower extremities bilaterally  Neuro: Alert and oriented x3, CN II-XII grossly intact  Psych: Appropriate mood and affect    Assessment and Plan:      (I10) Primary hypertension  Hypertension  Assessment: Blood pressure well controlled on current regimen. Discussed importance of healthy diet and exercise.  Plan:  - Labs up to " date  - Continue current medications as prescribed  - Intermittently monitor home blood pressures and follow up if elevated  - Encourage healthy diet and exercise    (E78.41) Elevated lipoprotein(a)  Assessment: Most recent lipids stable off statin. Reasonable to hold off on statin for now and monitor lipids. Discussed importance of healthy diet and exercise.  Plan:  - Monitor future fasting lipid panel  - Discussed healthy diet and lifestyle   Consider statin therapy based on future lipids    (H33.312) Horseshoe retinal tear, left eye  Assessment:  Plan:   - Follow-up with eye specialist as scheduled    (C44.749) Squamous cell carcinoma of skin of left lower extremity  Assessment: Previous squamous cell carcinoma of lower extremity status post removal.  Due for follow-up with dermatology for annual skin checks.  Plan:   -Follow-up with dermatology for routine skin checks    (M48.061) Spinal stenosis of lumbar region, unspecified whether neurogenic claudication present   Assessment: Patient with well-documented spinal stenosis with longstanding back pain.  No red flag symptoms.  Symptoms currently overall pretty good per patient and controlled with stretching, activity modification, and Celebrex.    Plan:  - Continue celebrex  - Activity modification  - Heat/ice as needed  - PT, home exercises      Patient was given instructions and counseling regarding his condition or for health maintenance advice. Please see specific information pulled into the AVS if appropriate.       Dr Rolf Onofre   Internal Medicine Physician  [facility]  [address], [address]  [place] [address]

## 2023-12-29 ENCOUNTER — LAB (OUTPATIENT)
Dept: FAMILY MEDICINE CLINIC | Facility: CLINIC | Age: 73
End: 2023-12-29
Payer: MEDICARE

## 2023-12-29 ENCOUNTER — OFFICE VISIT (OUTPATIENT)
Dept: FAMILY MEDICINE CLINIC | Facility: CLINIC | Age: 73
End: 2023-12-29
Payer: MEDICARE

## 2023-12-29 VITALS
RESPIRATION RATE: 16 BRPM | SYSTOLIC BLOOD PRESSURE: 122 MMHG | DIASTOLIC BLOOD PRESSURE: 74 MMHG | OXYGEN SATURATION: 97 % | WEIGHT: 216.6 LBS | HEIGHT: 71 IN | BODY MASS INDEX: 30.32 KG/M2 | HEART RATE: 67 BPM

## 2023-12-29 DIAGNOSIS — I10 ESSENTIAL HYPERTENSION: ICD-10-CM

## 2023-12-29 DIAGNOSIS — I10 PRIMARY HYPERTENSION: ICD-10-CM

## 2023-12-29 DIAGNOSIS — E78.41 ELEVATED LIPOPROTEIN(A): ICD-10-CM

## 2023-12-29 DIAGNOSIS — Z12.5 SCREENING FOR PROSTATE CANCER: ICD-10-CM

## 2023-12-29 DIAGNOSIS — Z13.0 SCREENING FOR DEFICIENCY ANEMIA: ICD-10-CM

## 2023-12-29 DIAGNOSIS — Z23 FLU VACCINE NEED: ICD-10-CM

## 2023-12-29 DIAGNOSIS — M48.061 SPINAL STENOSIS OF LUMBAR REGION, UNSPECIFIED WHETHER NEUROGENIC CLAUDICATION PRESENT: ICD-10-CM

## 2023-12-29 DIAGNOSIS — Z00.00 MEDICARE ANNUAL WELLNESS VISIT, SUBSEQUENT: Primary | ICD-10-CM

## 2023-12-29 LAB
ANION GAP SERPL CALCULATED.3IONS-SCNC: 10 MMOL/L (ref 5–15)
BASOPHILS # BLD AUTO: 0.04 10*3/MM3 (ref 0–0.2)
BASOPHILS NFR BLD AUTO: 0.8 % (ref 0–1.5)
BUN SERPL-MCNC: 25 MG/DL (ref 8–23)
BUN/CREAT SERPL: 27.5 (ref 7–25)
CALCIUM SPEC-SCNC: 9.7 MG/DL (ref 8.6–10.5)
CHLORIDE SERPL-SCNC: 103 MMOL/L (ref 98–107)
CHOLEST SERPL-MCNC: 184 MG/DL (ref 0–200)
CO2 SERPL-SCNC: 27 MMOL/L (ref 22–29)
CREAT SERPL-MCNC: 0.91 MG/DL (ref 0.76–1.27)
DEPRECATED RDW RBC AUTO: 41.4 FL (ref 37–54)
EGFRCR SERPLBLD CKD-EPI 2021: 89 ML/MIN/1.73
EOSINOPHIL # BLD AUTO: 0.23 10*3/MM3 (ref 0–0.4)
EOSINOPHIL NFR BLD AUTO: 4.7 % (ref 0.3–6.2)
ERYTHROCYTE [DISTWIDTH] IN BLOOD BY AUTOMATED COUNT: 12.1 % (ref 12.3–15.4)
GLUCOSE SERPL-MCNC: 92 MG/DL (ref 65–99)
HCT VFR BLD AUTO: 45.5 % (ref 37.5–51)
HDLC SERPL-MCNC: 48 MG/DL (ref 40–60)
HGB BLD-MCNC: 15.8 G/DL (ref 13–17.7)
IMM GRANULOCYTES # BLD AUTO: 0.02 10*3/MM3 (ref 0–0.05)
IMM GRANULOCYTES NFR BLD AUTO: 0.4 % (ref 0–0.5)
LDLC SERPL CALC-MCNC: 119 MG/DL (ref 0–100)
LDLC/HDLC SERPL: 2.45 {RATIO}
LYMPHOCYTES # BLD AUTO: 1.65 10*3/MM3 (ref 0.7–3.1)
LYMPHOCYTES NFR BLD AUTO: 34 % (ref 19.6–45.3)
MCH RBC QN AUTO: 32.6 PG (ref 26.6–33)
MCHC RBC AUTO-ENTMCNC: 34.7 G/DL (ref 31.5–35.7)
MCV RBC AUTO: 93.8 FL (ref 79–97)
MONOCYTES # BLD AUTO: 0.5 10*3/MM3 (ref 0.1–0.9)
MONOCYTES NFR BLD AUTO: 10.3 % (ref 5–12)
NEUTROPHILS NFR BLD AUTO: 2.42 10*3/MM3 (ref 1.7–7)
NEUTROPHILS NFR BLD AUTO: 49.8 % (ref 42.7–76)
NRBC BLD AUTO-RTO: 0 /100 WBC (ref 0–0.2)
PLATELET # BLD AUTO: 149 10*3/MM3 (ref 140–450)
PMV BLD AUTO: 12.8 FL (ref 6–12)
POTASSIUM SERPL-SCNC: 4 MMOL/L (ref 3.5–5.2)
PSA SERPL-MCNC: 1.01 NG/ML (ref 0–4)
RBC # BLD AUTO: 4.85 10*6/MM3 (ref 4.14–5.8)
SODIUM SERPL-SCNC: 140 MMOL/L (ref 136–145)
TRIGL SERPL-MCNC: 92 MG/DL (ref 0–150)
VLDLC SERPL-MCNC: 17 MG/DL (ref 5–40)
WBC NRBC COR # BLD AUTO: 4.86 10*3/MM3 (ref 3.4–10.8)

## 2023-12-29 PROCEDURE — 80061 LIPID PANEL: CPT | Performed by: INTERNAL MEDICINE

## 2023-12-29 PROCEDURE — G0103 PSA SCREENING: HCPCS | Performed by: INTERNAL MEDICINE

## 2023-12-29 PROCEDURE — 85025 COMPLETE CBC W/AUTO DIFF WBC: CPT | Performed by: INTERNAL MEDICINE

## 2023-12-29 PROCEDURE — 80048 BASIC METABOLIC PNL TOTAL CA: CPT | Performed by: INTERNAL MEDICINE

## 2023-12-29 PROCEDURE — 36415 COLL VENOUS BLD VENIPUNCTURE: CPT

## 2023-12-29 RX ORDER — METHYLPREDNISOLONE 4 MG/1
TABLET ORAL
Qty: 21 TABLET | Refills: 0 | Status: SHIPPED | OUTPATIENT
Start: 2023-12-29

## 2023-12-29 RX ORDER — AMLODIPINE BESYLATE 2.5 MG/1
2.5 TABLET ORAL DAILY
Qty: 90 TABLET | Refills: 3 | Status: SHIPPED | OUTPATIENT
Start: 2023-12-29

## 2023-12-29 NOTE — PATIENT INSTRUCTIONS
Stay in the room for flu    Can get RSV and COVID vaccine with pharmacy    Please stop at lab on second floor to have blood drawn    Medications:  - Continue current medications as prescribed  - Medrol dose pack only when significantly worsening back pain    Encourage healthy diet and exercise    Follow up with dermatology as scheduled    Encourage advanced health care directive    Follow up for annual preventative care visit

## 2023-12-29 NOTE — PROGRESS NOTES
Chief Complaint  Medicare Wellness-subsequent    HPI:    Daren Zambrano presents to [facility]    Patient presenting for Medicare Wellness visit. Health has overall been good recently other than recent retinal tear status post lasering and then later had retinal detachment. Seen by ophthalmology and surgery for detachment. Vision not quite what it was but significant improved from previous and not post op complication.     Hypertension  Blood pressure has been well controlled on losartan-hydrochlorothiazide 100-25 mg daily and amlodipine 2.5 mg daily. Denies headaches, blurry vision, dizziness, chest pain, shortness of breath, or palpitations.     Hyperlipidemia  Not currently on statin and has not been previously.      Spinal stenosis  He has had about month periods where he has had significant pain which limited his ADL. He has modified his activity and tries not to lift and strain as much. Still able to play golf. Currently on celebrex. He has previously tried injections and PT. He tries to stretch as well. Previously had spine surgery 2019.      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  Status post prior removal.  Was due to see dermatology this spring, although provider was out for maternity leave.    Patient previously had COVID in August and wondering about COVID booster. Symptoms treated symptomatically and made complete recovery.     Preventative:    Social: Retired teacher    Diet and Exercise: Diet overall pretty good other than around the holidays, exercise could be better    Alcohol, Tobacco, and Recreational Drug use: one beer daily    Cancer screenings:  PSA: recently normal  Colonoscopy: Most recent colonoscopy 8/18/2022 which showed internal and external hemorrhoids and mild diverticulosis of the descending and sigmoid colon; no recall for colonoscopy recommended in future    Immunizations: Due for flu and COVID    Advanced Health Care Directive: He does  "not have one but would like to complete      Review of Systems:  ROS negative unless otherwise noted in HPI above.    Past Medical History:   Diagnosis Date    Clavicle fracture     Detached retina, left     Dr. Davila    Hypertension     Injury of back     stenosis    Low back pain     Osteoarthritis     Slow to wake up after anesthesia          Current Outpatient Medications:     amLODIPine (NORVASC) 2.5 MG tablet, Take 1 tablet by mouth Daily., Disp: 90 tablet, Rfl: 3    aspirin 81 MG EC tablet, Take 1 tablet by mouth Daily. Stop 19 for surgery, Disp: , Rfl:     celecoxib (CeleBREX) 200 MG capsule, TAKE 1 CAPSULE TWICE DAILY, Disp: 180 capsule, Rfl: 3    losartan-hydrochlorothiazide (HYZAAR) 100-25 MG per tablet, Take 1 tablet by mouth Daily., Disp: 90 tablet, Rfl: 3    Multiple Vitamins-Minerals (MULTIVITAMIN WITH MINERALS) tablet tablet, Take 1 tablet by mouth Daily. Stop 19 for surgery, Disp: , Rfl:     Omega-3 Fatty Acids (FISH OIL OMEGA-3 PO), Take 1 tablet by mouth Daily. 1000/300mg.  Stop 19 for surgery, Disp: , Rfl:     vitamin E 100 UNIT capsule, Take 4 capsules by mouth Daily. Stop 19 for surgery, Disp: , Rfl:     Social History     Socioeconomic History    Marital status:    Tobacco Use    Smoking status: Former     Types: Cigarettes     Quit date: 7/10/1977     Years since quittin.5    Smokeless tobacco: Never   Vaping Use    Vaping Use: Never used   Substance and Sexual Activity    Alcohol use: Yes     Alcohol/week: 1.0 standard drink of alcohol     Types: 1 Cans of beer per week     Comment: Occ.    Drug use: No    Sexual activity: Yes        Objective   Vital Signs:  /74   Pulse 67   Resp 16   Ht 180.3 cm (71\")   Wt 98.2 kg (216 lb 9.6 oz)   SpO2 97%   BMI 30.21 kg/m²   Estimated body mass index is 30.21 kg/m² as calculated from the following:    Height as of this encounter: 180.3 cm (71\").    Weight as of this encounter: 98.2 kg (216 lb 9.6 " oz).    Physical Exam:  General: Well-appearing patient, no apparent distress  HEENT: No posterior pharynx erythema, no tonsillar erythema or exudates, normal external auditory canals, TM normal without bulging or erythema  Neck: No cervical lymphadenopathy  Cardiac: Regular rate and rhythm, normal S1/S2, no murmur, rubs or gallops, no lower extremity edema  Lungs: Clear to auscultation bilaterally, no crackles or wheezes  Abdomen: Soft, non-tender, no guarding or rebound tenderness, no hepatosplenomegaly  Skin: No significant rashes or lesions  MSK: Grossly normal tone and strength  Neuro: Alert and oriented x3, CN II-XII grossly intact  Psych: Appropriate mood and affect    Assessment and Plan:    (Z00.00) Medicare annual wellness visit, subsequent  Patient is a 73 year old who is overall doing well. Reviewed social and family history. Encouraged increased healthy diet and exercise and discussed importance to overall health. Up to date with cancer screenings including PSA and colonoscopy. Discussed indicated vaccines based on age and comorbidities. No skin, mood, concerns. Health care directive discussed and patient planning on completing.  Plan:  - Encourage healthy diet and exercise  - Up date vaccines, if necessary; give flu today, patient to follow up with pharmacy for COVID  - Screening labs as ordered  - Update cancer screening as below  - Follow-up with dermatology for routine skin checks  - Advanced health care directive     (I10) Primary hypertension -   Assessment: Blood pressure well controlled on current regimen. Discussed  importance of healthy diet and exercise.  Plan:  - BMP  - Continue current medications as prescribed  - Intermittently monitor home blood pressures and follow up if elevated  - Encourage healthy diet and exercise    (E78.41) Elevated lipoprotein(a) -   Assessment: Not currently on statin. Discussed importance of healthy diet and exercise.  Anticipate will likely recommend statin  based on 10-year ASCVD risk calculation.  Plan:  - Fasting lipid panel  - Consider statin based on results  - Discussed healthy diet and lifestyle     (M48.061) Spinal stenosis of lumbar region, unspecified whether neurogenic claudication present  Assessment: Patient with chronic back pain which is overall stable.  No red flag symptoms. No current indications for imaging. Will continue to treat conservatively with Celebrex which he has tolerated well.  Will prescribe Medrol Dosepak for patient to have on hand as needed as he frequently travels and experiences occasional back pain when walking in [place].  Plan:  - Celebrex as prescribed  - Medrol Dosepak as needed for severe pain; patient counseled that would not be regular occurrence  - Over-the-counter medications including acetaminophen as needed  - Activity modification  - Heat/ice as needed    (Z12.5) Screening for prostate cancer - Plan: PSA SCREENING    (Z13.0) Screening for deficiency anemia - Plan: CBC w AUTO Differential    (Z23) Flu vaccine need      Patient was given instructions and counseling regarding his condition or for health maintenance advice. Please see specific information pulled into the AVS if appropriate.       Dr Rolf Onofre   Internal Medicine Physician  [facility]  [address], [address]  [place] [address]

## 2024-03-14 RX ORDER — CELECOXIB 200 MG/1
200 CAPSULE ORAL 2 TIMES DAILY
Qty: 180 CAPSULE | Refills: 1 | Status: SHIPPED | OUTPATIENT
Start: 2024-03-14

## 2024-04-23 NOTE — PROGRESS NOTES
Subjective   Daren Zambrano is a 74 y.o. male is here for follow-up for lower back pain.  Patient was last seen on 8/1/2022. He is returning with increased lower back pain since last february. Due to pain, he had to give up golf and hiking, and hunting. His goals are to mow his lawn and some hiking.     On last visit:       Lower back pain is 8/10 on VAS, at maximum is 9/10. Pain is sharp, tingling, bumbness in nature. Pain is referred left lateral thigh and left groin intermittently. The pain is constant. The pain is improved by TFESI in past. The pain is worse with twisting, standing.  Chronic numbness bilateral lower extremities.  Pain mostly in the left lateral thigh stopping at the knee. Main complaint is axial lower back pain.       Previous Injection:   7/14/2022-left TFESI L4-5, L5-S1 - 50% pain relief.   4/20/2022-left TFESI L4-5 and L5-S1- 50% pain relief; significant functional improvement.       Hx: Referred by Dr. Kimball, Mei SMITH MD for  lower back pain.  His initial pain started in 2010 and had epidurals with variant degrees of success. His pain progressed to the point where he was barely able to walk and  s/p surgeries as described in PMH in 2019. He used to ride bicycle for 25 miles before pain started. Walks about 2.5 miles/day - slow walk. He would like to get back to his normal activities which he is not able to do due to significant pain.        PHQ-9- 1  SOAPP-0      PMH:   Partial laminectomy of L3-5 with Coflex implant (b/w/ L3-4; L4-5), L4-S1 decompression, left side discectomy by Dr. Mckinney -2019; Hypertension, synovial cyst of popliteal space, SCC of skin LLE, s/p L hip replacement- 2019     Current Medications:   Celebrex 200 mg       Past Medications:  Gabapentin- stopped as neuropathic pain had resolved.   Tizanidine 4 mg qhs PRN. - restless leg syndrome      Past Modalities:  TENS:                                                                          no                                                   Physical Therapy Within The Last 6 Months              No (2019- didn't have relief).   Psychotherapy                                                            no  Massage Therapy                                                       no     Patient Complains Of:  Uro-Fecal Incontinence          no  Weight Gain/Loss                   no  Fever/Chills                             no  Weakness                               Yes (uses cane for balance issues).               Current Outpatient Medications:     amLODIPine (NORVASC) 2.5 MG tablet, Take 1 tablet by mouth Daily., Disp: 90 tablet, Rfl: 3    aspirin 81 MG EC tablet, Take 1 tablet by mouth Daily. Stop 7-22-19 for surgery, Disp: , Rfl:     celecoxib (CeleBREX) 200 MG capsule, Take 1 capsule by mouth 2 (Two) Times a Day., Disp: 180 capsule, Rfl: 1    losartan-hydrochlorothiazide (HYZAAR) 100-25 MG per tablet, Take 1 tablet by mouth Daily., Disp: 90 tablet, Rfl: 3    methylPREDNISolone (MEDROL) 4 MG dose pack, Take as directed on package instructions., Disp: 21 tablet, Rfl: 0    Multiple Vitamins-Minerals (MULTIVITAMIN WITH MINERALS) tablet tablet, Take 1 tablet by mouth Daily. Stop 7-22-19 for surgery, Disp: , Rfl:     Omega-3 Fatty Acids (FISH OIL OMEGA-3 PO), Take 1 tablet by mouth Daily. 1000/300mg.  Stop 7-22-19 for surgery, Disp: , Rfl:     vitamin E 100 UNIT capsule, Take 4 capsules by mouth Daily. Stop 7-22-19 for surgery, Disp: , Rfl:     The following portions of the patient's history were reviewed and updated as appropriate: allergies, current medications, past family history, past medical history, past social history, past surgical history, and problem list.      REVIEW OF PERTINENT MEDICAL DATA    Past Medical History:   Diagnosis Date    Clavicle fracture     Detached retina, left     Dr. Davila    Hypertension     Injury of back     stenosis    Low back pain     Osteoarthritis     Slow to wake up after anesthesia      Past  Surgical History:   Procedure Laterality Date    DUPUYTREN CONTRACTURE RELEASE      FOOT SURGERY      TUMOR REMOVED    KNEE ARTHROSCOPY Right      AND 2016    LUMBAR LAMINECTOMY DISCECTOMY DECOMPRESSION Bilateral 2019    Procedure: PDC PARTIAL LAMINECTOMY OF L3-L5 WITH COFLEX IMPLANTATION, L5-S1 DECOMPRESSION LEFT SIDE DISCECTOMY;  Surgeon: Isabel Mckinney MD;  Location: [facility] MAIN OR;  Service: Orthopedic Spine    RESECTION DISTAL CLAVICLE  2000     Family History   Problem Relation Age of Onset    Dementia Mother     Other Mother         knee replacement    Cancer Father      Social History     Socioeconomic History    Marital status:    Tobacco Use    Smoking status: Former     Current packs/day: 0.00     Types: Cigarettes     Quit date: 7/10/1977     Years since quittin.8    Smokeless tobacco: Never   Vaping Use    Vaping status: Never Used   Substance and Sexual Activity    Alcohol use: Yes     Alcohol/week: 1.0 standard drink of alcohol     Types: 1 Cans of beer per week     Comment: Occ.    Drug use: No    Sexual activity: Yes         Review of Systems   Musculoskeletal:  Positive for arthralgias and back pain.         Vitals:    24 1344   BP: 141/65   Pulse: 60   Resp: 16   SpO2: 95%   Weight: 97.5 kg (215 lb)   PainSc:   8           Objective   Physical Exam  Musculoskeletal:         General: Tenderness present.        Legs:    Neurological:      Deep Tendon Reflexes:      Reflex Scores:       Patellar reflexes are 1+ on the right side and 1+ on the left side.       Achilles reflexes are 1+ on the right side and 1+ on the left side.     Comments: Motor strength 5/5 b/l LE  Sensory intact b/l LE             Imaging Reviewed:  MRI lumbar spine-2021  -Postsurgical changes including Coflex placement at the posterior elements of L3-4 and L4-5, presumed to left L5-S1 laminectomy.     -U3-7-cwxiimzoex on left side.  Moderate facet arthropathy.  Moderate to severe left neural  foraminal stenosis which has progressed since last exam.  Mild right.  L2-3-severe left and moderate right facet arthropathy.  Moderate central canal stenosis.  Moderate left lateral recess and neural foraminal stenosis.  Mild right.  L3-4-Coflex device between the spinous processes.  Severe facet arthropathy.  Mild central canal stenosis.  Moderate left neural foraminal stenosis.  Severe right neural foraminal stenosis appears to be impinging upon right L3 nerve root within the foramen, appears unchanged.  L4-5-Coflex device.  Severe left and moderate right facet arthropathy.  Mild central canal stenosis.  Severe right neural foraminal stenosis impinging on right L4 nerve root.  Similar to prior study.  Mild to moderate left neural foraminal narrowing.  [id number]-[id number] left-sided laminectomy.  Moderate bilateral facet arthropathy.  Moderate to severe left neural foraminal stenosis and severe right neural foraminal stenosis.            Assessment:    1. Lumbar spondylosis    2. Lumbar radiculopathy    3. History of lumbar laminectomy for spinal cord decompression           Plan:   1. Defer UDS for now.   2. We discussed trying a course of formal physical therapy.  Physical therapy can help strengthen and stretch the muscles around the joints. Continue to be as active as possible.   3. We had discussion regarding adding gabapentin for nerve pain and benefits and risks. Patient would like to hold off on starting it.   4.  Patient seems to have two different problems, pain in the lower back from spondylosis and referred pain in the leg from stenosis. His low back pan is bothering him the most so will try MBB for long term pain relief.   5. Patient has mainly AXIAL lower back pain. Patient informed they would likely benefit from a medial branch block on bilateral from L4 to Sa.  MRI shows severe facet arthritis. Facet tenderness is positive from L4 and Sa. Patient informed the procedure is both therapeutic and diagnostic  in nature.  The procedure was described in detail and the risks, benefits and alternatives were discussed with the patient (including but not limited to: bleeding, infection, nerve damage, worsening of pain, CSF leak, inability to perform injection, paralysis, seizures, and death) who agreed to proceed.  Discussed RFA at 70-80 degree for  sec if patient has good relief from 2 diagnostic MBB.    6. Last MRI was in 2021. Will obtain new lumbar MRI wo contrast to evaluate spinal stenosis.   7. Had discussed SCS in past.     RTC for MBB.      Juan J Thomas DO  Pain Management   [facility]         INSPECT REPORT    As part of the patient's treatment plan, I may be prescribing controlled substances. The patient has been made aware of appropriate use of such medications, including potential risk of somnolence, limited ability to drive and/or work safely, and the potential for dependence or overdose. It has also been made clear that these medications are for use by this patient only, without concomitant use of alcohol or other substances unless prescribed.     Patient has completed prescribing agreement detailing terms of continued prescribing of controlled substances, including monitoring INSPECT reports, urine drug screening, and pill counts if necessary. The patient is aware that inappropriate use will results in cessation of prescribing such medications.    INSPECT report has been reviewed and scanned into the patient's chart.

## 2024-04-24 ENCOUNTER — OFFICE VISIT (OUTPATIENT)
Dept: PAIN MEDICINE | Facility: CLINIC | Age: 74
End: 2024-04-24
Payer: MEDICARE

## 2024-04-24 VITALS
OXYGEN SATURATION: 95 % | HEART RATE: 60 BPM | SYSTOLIC BLOOD PRESSURE: 141 MMHG | WEIGHT: 215 LBS | BODY MASS INDEX: 29.99 KG/M2 | DIASTOLIC BLOOD PRESSURE: 65 MMHG | RESPIRATION RATE: 16 BRPM

## 2024-04-24 DIAGNOSIS — Z98.890 HISTORY OF LUMBAR LAMINECTOMY FOR SPINAL CORD DECOMPRESSION: ICD-10-CM

## 2024-04-24 DIAGNOSIS — M54.16 LUMBAR RADICULOPATHY: ICD-10-CM

## 2024-04-24 DIAGNOSIS — M47.816 LUMBAR SPONDYLOSIS: Primary | ICD-10-CM

## 2024-04-24 PROCEDURE — 3078F DIAST BP <80 MM HG: CPT | Performed by: STUDENT IN AN ORGANIZED HEALTH CARE EDUCATION/TRAINING PROGRAM

## 2024-04-24 PROCEDURE — 99214 OFFICE O/P EST MOD 30 MIN: CPT | Performed by: STUDENT IN AN ORGANIZED HEALTH CARE EDUCATION/TRAINING PROGRAM

## 2024-04-24 PROCEDURE — 1159F MED LIST DOCD IN RCRD: CPT | Performed by: STUDENT IN AN ORGANIZED HEALTH CARE EDUCATION/TRAINING PROGRAM

## 2024-04-24 PROCEDURE — 1160F RVW MEDS BY RX/DR IN RCRD: CPT | Performed by: STUDENT IN AN ORGANIZED HEALTH CARE EDUCATION/TRAINING PROGRAM

## 2024-04-24 PROCEDURE — 1125F AMNT PAIN NOTED PAIN PRSNT: CPT | Performed by: STUDENT IN AN ORGANIZED HEALTH CARE EDUCATION/TRAINING PROGRAM

## 2024-04-24 PROCEDURE — 3077F SYST BP >= 140 MM HG: CPT | Performed by: STUDENT IN AN ORGANIZED HEALTH CARE EDUCATION/TRAINING PROGRAM

## 2024-05-14 ENCOUNTER — HOSPITAL ENCOUNTER (OUTPATIENT)
Dept: PAIN MEDICINE | Facility: HOSPITAL | Age: 74
Discharge: HOME | End: 2024-05-14
Payer: MEDICARE

## 2024-05-14 VITALS
HEART RATE: 56 BPM | DIASTOLIC BLOOD PRESSURE: 69 MMHG | TEMPERATURE: 97.1 F | OXYGEN SATURATION: 95 % | RESPIRATION RATE: 16 BRPM | HEIGHT: 71 IN | SYSTOLIC BLOOD PRESSURE: 132 MMHG | WEIGHT: 215 LBS | BODY MASS INDEX: 30.1 KG/M2

## 2024-05-14 DIAGNOSIS — R52 PAIN: ICD-10-CM

## 2024-05-14 DIAGNOSIS — M47.816 LUMBAR SPONDYLOSIS: Primary | ICD-10-CM

## 2024-05-14 PROCEDURE — 64494 INJ PARAVERT F JNT L/S 2 LEV: CPT | Performed by: STUDENT IN AN ORGANIZED HEALTH CARE EDUCATION/TRAINING PROGRAM

## 2024-05-14 PROCEDURE — 25010000002 METHYLPREDNISOLONE PER 80 MG: Performed by: STUDENT IN AN ORGANIZED HEALTH CARE EDUCATION/TRAINING PROGRAM

## 2024-05-14 PROCEDURE — 77003 FLUOROGUIDE FOR SPINE INJECT: CPT

## 2024-05-14 PROCEDURE — 64493 INJ PARAVERT F JNT L/S 1 LEV: CPT | Performed by: STUDENT IN AN ORGANIZED HEALTH CARE EDUCATION/TRAINING PROGRAM

## 2024-05-14 PROCEDURE — 25010000002 BUPIVACAINE (PF) 0.25 % SOLUTION: Performed by: STUDENT IN AN ORGANIZED HEALTH CARE EDUCATION/TRAINING PROGRAM

## 2024-05-14 RX ORDER — METHYLPREDNISOLONE ACETATE 80 MG/ML
80 INJECTION, SUSPENSION INTRA-ARTICULAR; INTRALESIONAL; INTRAMUSCULAR; SOFT TISSUE ONCE
Status: COMPLETED | OUTPATIENT
Start: 2024-05-14 | End: 2024-05-14

## 2024-05-14 RX ORDER — LIDOCAINE HYDROCHLORIDE 10 MG/ML
5 INJECTION, SOLUTION EPIDURAL; INFILTRATION; INTRACAUDAL; PERINEURAL ONCE
Status: COMPLETED | OUTPATIENT
Start: 2024-05-14 | End: 2024-05-14

## 2024-05-14 RX ORDER — BUPIVACAINE HYDROCHLORIDE 2.5 MG/ML
10 INJECTION, SOLUTION EPIDURAL; INFILTRATION; INTRACAUDAL ONCE
Status: COMPLETED | OUTPATIENT
Start: 2024-05-14 | End: 2024-05-14

## 2024-05-14 RX ADMIN — LIDOCAINE HYDROCHLORIDE 5 ML: 10 INJECTION, SOLUTION EPIDURAL; INFILTRATION; INTRACAUDAL; PERINEURAL at 13:38

## 2024-05-14 RX ADMIN — METHYLPREDNISOLONE ACETATE 80 MG: 80 INJECTION, SUSPENSION INTRA-ARTICULAR; INTRALESIONAL; INTRAMUSCULAR; SOFT TISSUE at 13:42

## 2024-05-14 RX ADMIN — BUPIVACAINE HYDROCHLORIDE 10 ML: 2.5 INJECTION, SOLUTION EPIDURAL; INFILTRATION; INTRACAUDAL; PERINEURAL at 13:42

## 2024-05-14 NOTE — PROCEDURES
PROCEDURE:  Bilateral L 4, 5 and SA MBB     INDICATION: Patient complains of pain in the lower back, bilateral.  Pain increases on extension of the spine, facet tenderness present.       PROCEDURE DETAILS:   After obtaining written informed consent patient was taken to the procedure room. Pre-procedure blood pressure and pulse were stable and recorded in patients clinic chart. The patient was placed in a prone position and the lower back was prepped with chloraprep and draped in the usual sterile fashion.  The skin was infiltrated with 1% lidocaine at the junction of transverse process with the vertebral body at the left L 4 level. A 22-gauge, 3.5-inch needle was inserted into the lumbar medial branch under radiographic guidance. Both AP and lateral views were obtained.   Following placement of the needle and negative aspiration, 1.2 cc mixture of bupivacaine 0.25% with depo-medrol was injected  The identical procedure was performed on left L5 and SA medial branch was repeated on right side at L4, L5, Sa.  A total of 9 cc of 0.25% bupivacaine with 80 mg of Depo-medrol was injected. During the procedure there was no evidence of CSF, paresthesias or heme.    After the procedure the needles were removed. Skin was cleaned and a sterile dressing was applied.    Following the procedure the patient's vital signs were stable. The patient was discharged home in good condition after being given discharge instructions.    COMPLICATIONS None    Juan J Thomas DO  Pain Management   [facility]

## 2024-05-14 NOTE — H&P
H and P reviewed from previous visit and no changes to patient's clinical presentation. Will proceed with procedure as planned. Patient denies history of DM and being on blood thinners.    Juan J Thomas DO  Pain Management   [facility]

## 2024-05-15 ENCOUNTER — TELEPHONE (OUTPATIENT)
Dept: PAIN MEDICINE | Facility: HOSPITAL | Age: 74
End: 2024-05-15
Payer: MEDICARE

## 2024-05-15 NOTE — TELEPHONE ENCOUNTER
Post procedure phone call completed.  Pt states they are doing good and denies questions or concerns. Pain #3  Patient states he got 85% relief.

## 2024-05-20 ENCOUNTER — HOSPITAL ENCOUNTER (OUTPATIENT)
Dept: MRI IMAGING | Facility: HOSPITAL | Age: 74
Discharge: HOME | End: 2024-05-20
Admitting: STUDENT IN AN ORGANIZED HEALTH CARE EDUCATION/TRAINING PROGRAM
Payer: MEDICARE

## 2024-05-20 DIAGNOSIS — M54.16 LUMBAR RADICULOPATHY: ICD-10-CM

## 2024-05-20 PROCEDURE — 72148 MRI LUMBAR SPINE W/O DYE: CPT

## 2024-05-28 NOTE — PROGRESS NOTES
Subjective   Daren Zambrano is a 74 y.o. male is here for follow-up for lower back pain.  Patient was last seen for bilateral MBB excellent relief for 5 days where he was able to walk for longer period of time.  MRI was obtained since last visit.    On last visit:       Lower back pain is 8/10 on VAS, at maximum is 9/10. Pain is sharp, tingling, bumbness in nature. Pain is referred left lateral thigh and left groin intermittently. The pain is constant. The pain is improved by TFESI in past. The pain is worse with twisting, standing.  Chronic numbness bilateral lower extremities.  Pain mostly in the left lateral thigh stopping at the knee. Main complaint is axial lower back pain.       Previous Injection:   4/14/2024-B/L MBB L4-SA- 80-85% pain relief where he was able to walk up to 3 miles for 5 days.   7/14/2022-left TFESI L4-5, L5-S1 - 50% pain relief.   4/20/2022-left TFESI L4-5 and L5-S1- 50% pain relief; significant functional improvement.       Hx: Referred by Mei Altamirano MD for  lower back pain.  His initial pain started in 2010 and had epidurals with variant degrees of success. His pain progressed to the point where he was barely able to walk and  s/p surgeries as described in PMH in 2019. He used to ride bicycle for 25 miles before pain started. Walks about 2.5 miles/day - slow walk. He would like to get back to his normal activities which he is not able to do due to significant pain.        PHQ-9- 1  SOAPP-0      PMH:   Partial laminectomy of L3-5 with Coflex implant (b/w/ L3-4; L4-5), L4-S1 decompression, left side discectomy by Dr. Mckinney -2019; Hypertension, synovial cyst of popliteal space, SCC of skin LLE, s/p L hip replacement- 2019     Current Medications:   Celebrex 200 mg       Past Medications:  Gabapentin- stopped as neuropathic pain had resolved.   Tizanidine 4 mg qhs PRN. - restless leg syndrome      Past Modalities:  TENS:                                                                           no                                                  Physical Therapy Within The Last 6 Months              No (2019- didn't have relief).   Psychotherapy                                                            no  Massage Therapy                                                       no     Patient Complains Of:  Uro-Fecal Incontinence          no  Weight Gain/Loss                   no  Fever/Chills                             no  Weakness                               Yes (uses cane for balance issues).               Current Outpatient Medications:     amLODIPine (NORVASC) 2.5 MG tablet, Take 1 tablet by mouth Daily., Disp: 90 tablet, Rfl: 3    aspirin 81 MG EC tablet, Take 1 tablet by mouth Daily. Stop 7-22-19 for surgery, Disp: , Rfl:     celecoxib (CeleBREX) 200 MG capsule, Take 1 capsule by mouth 2 (Two) Times a Day., Disp: 180 capsule, Rfl: 1    losartan-hydrochlorothiazide (HYZAAR) 100-25 MG per tablet, Take 1 tablet by mouth Daily., Disp: 90 tablet, Rfl: 3    methylPREDNISolone (MEDROL) 4 MG dose pack, Take as directed on package instructions., Disp: 21 tablet, Rfl: 0    Multiple Vitamins-Minerals (MULTIVITAMIN WITH MINERALS) tablet tablet, Take 1 tablet by mouth Daily. Stop 7-22-19 for surgery, Disp: , Rfl:     Omega-3 Fatty Acids (FISH OIL OMEGA-3 PO), Take 1 tablet by mouth Daily. 1000/300mg.  Stop 7-22-19 for surgery, Disp: , Rfl:     vitamin E 100 UNIT capsule, Take 4 capsules by mouth Daily. Stop 7-22-19 for surgery, Disp: , Rfl:     The following portions of the patient's history were reviewed and updated as appropriate: allergies, current medications, past family history, past medical history, past social history, past surgical history, and problem list.      REVIEW OF PERTINENT MEDICAL DATA    Past Medical History:   Diagnosis Date    Clavicle fracture     Detached retina, left     Dr. Davila    Hypertension     Injury of back     stenosis    Low back pain     Osteoarthritis     Slow  to wake up after anesthesia      Past Surgical History:   Procedure Laterality Date    DUPUYTREN CONTRACTURE RELEASE      FOOT SURGERY      TUMOR REMOVED    KNEE ARTHROSCOPY Right     2013 AND 2016    LUMBAR LAMINECTOMY DISCECTOMY DECOMPRESSION Bilateral 2019    Procedure: PDC PARTIAL LAMINECTOMY OF L3-L5 WITH COFLEX IMPLANTATION, L5-S1 DECOMPRESSION LEFT SIDE DISCECTOMY;  Surgeon: Isabel Mckinney MD;  Location: [facility] MAIN OR;  Service: Orthopedic Spine    RESECTION DISTAL CLAVICLE  2000     Family History   Problem Relation Age of Onset    Dementia Mother     Other Mother         knee replacement    Cancer Father      Social History     Socioeconomic History    Marital status:    Tobacco Use    Smoking status: Former     Current packs/day: 0.00     Types: Cigarettes     Quit date: 7/10/1977     Years since quittin.9    Smokeless tobacco: Never   Vaping Use    Vaping status: Never Used   Substance and Sexual Activity    Alcohol use: Yes     Alcohol/week: 1.0 standard drink of alcohol     Types: 1 Cans of beer per week     Comment: Occ.    Drug use: No    Sexual activity: Yes         Review of Systems   Musculoskeletal:  Positive for arthralgias and back pain.         Vitals:    24 0941   BP: 132/77   Pulse: 55   Resp: 16   SpO2: 94%   Weight: 97.5 kg (215 lb)   PainSc:   7             Objective   Physical Exam  Musculoskeletal:         General: Tenderness present.        Legs:    Neurological:      Deep Tendon Reflexes:      Reflex Scores:       Patellar reflexes are 1+ on the right side and 1+ on the left side.       Achilles reflexes are 1+ on the right side and 1+ on the left side.     Comments: Motor strength 5/5 b/l LE  Sensory intact b/l LE             Imaging Reviewed:  MRI lumbar spine-2024  - Multilevel small Schmorl's endplate protrusion present  - Complex posterior interspinous device at L3-4 and L4-5  [id number] canal stenosis.  Mild to moderate right  neuroforaminal stenosis  L1-2-moderate facet arthropathy and mild canal stenosis.  Moderate left lateral recess stenosis.  Severe left neuroforaminal stenosis  L2-3-disc osteophyte.  Severe facet arthropathy with ligamentum flavum hypertrophy causing severe central canal stenosis effacing CSF around nerve roots.  Mild to moderate right and moderate left neural foraminal stenosis.  Exiting left L2 nerve root may be indented by osteophyte formation as it exits the foramen  L3-4-severe facet arthropathy greatest on the right.  Severe right neural foraminal stenosis.  Moderate left neuroforaminal stenosis.  L4-5-moderate left and moderate to severe right facet arthropathy.  Moderate left neural foraminal stenosis.  Severe right neuroforaminal stenosis  L5-S1-disc bulge with small posterior high annular tear appearance.  Moderate bilateral facet arthropathy.  Severe right and moderate to severe left neural foraminal stenosis.    MRI lumbar spine-5/19/2021  -Postsurgical changes including Coflex placement at the posterior elements of L3-4 and L4-5, presumed to left L5-S1 laminectomy.     -[id number]-[id number] on left side.  Moderate facet arthropathy.  Moderate to severe left neural foraminal stenosis which has progressed since last exam.  Mild right.  L2-3-severe left and moderate right facet arthropathy.  Moderate central canal stenosis.  Moderate left lateral recess and neural foraminal stenosis.  Mild right.  L3-4-Coflex device between the spinous processes.  Severe facet arthropathy.  Mild central canal stenosis.  Moderate left neural foraminal stenosis.  Severe right neural foraminal stenosis appears to be impinging upon right L3 nerve root within the foramen, appears unchanged.  L4-5-Coflex device.  Severe left and moderate right facet arthropathy.  Mild central canal stenosis.  Severe right neural foraminal stenosis impinging on right L4 nerve root.  Similar to prior study.  Mild to moderate left neural foraminal  narrowing.  [id number] left-sided laminectomy.  Moderate bilateral facet arthropathy.  Moderate to severe left neural foraminal stenosis and severe right neural foraminal stenosis.            Assessment:    1. Lumbar spondylosis    2. Lumbar radiculopathy    3. History of lumbar laminectomy for spinal cord decompression          Plan:   1. Defer UDS for now.   2. We discussed trying a course of formal physical therapy.  Physical therapy can help strengthen and stretch the muscles around the joints. Continue to be as active as possible.   3. We had discussion regarding adding gabapentin for nerve pain and benefits and risks. Patient would like to hold off on starting it.   4.  Patient seems to have two different problems, pain in the lower back from spondylosis and referred pain in the leg from stenosis. His low back pan is bothering him the most so will try MBB for long term pain relief.   5. Patient has mainly AXIAL lower back pain.  Excellent relief with diagnostic bilateral L4-SA MBB but unfortunately short lasting.  Patient informed they would likely benefit from a with local only medial branch block on bilateral from L4 to Sa.  MRI shows severe facet arthritis. Facet tenderness is positive from L4 and Sa. Patient informed the procedure is only diagnostic in nature.  The procedure was described in detail and the risks, benefits and alternatives were discussed with the patient (including but not limited to: bleeding, infection, nerve damage, worsening of pain, CSF leak, inability to perform injection, paralysis, seizures, and death) who agreed to proceed.  Discussed RFA at 70-80 degree for  sec if patient has good relief from 2 diagnostic MBB.    6.  New lumbar MRI was reviewed with patient using spinal model and images.  Patient does have worsening of L2-3 stenosis consistent with his symptoms of neurogenic claudication.  We discussed with patient that he may still continue to have some pain after RFA  and may require an LESI.  Patient understands that.  We also discussed possibility of returning to surgeon, but patient would like to hold off on any surgeries at this point and use it as a last resort.      RTC for MBB.      Juan J Thomas DO  Pain Management   [facility]         INSPECT REPORT    As part of the patient's treatment plan, I may be prescribing controlled substances. The patient has been made aware of appropriate use of such medications, including potential risk of somnolence, limited ability to drive and/or work safely, and the potential for dependence or overdose. It has also been made clear that these medications are for use by this patient only, without concomitant use of alcohol or other substances unless prescribed.     Patient has completed prescribing agreement detailing terms of continued prescribing of controlled substances, including monitoring INSPECT reports, urine drug screening, and pill counts if necessary. The patient is aware that inappropriate use will results in cessation of prescribing such medications.    INSPECT report has been reviewed and scanned into the patient's chart.

## 2024-05-29 ENCOUNTER — OFFICE VISIT (OUTPATIENT)
Dept: PAIN MEDICINE | Facility: CLINIC | Age: 74
End: 2024-05-29
Payer: MEDICARE

## 2024-05-29 VITALS
OXYGEN SATURATION: 94 % | SYSTOLIC BLOOD PRESSURE: 132 MMHG | WEIGHT: 215 LBS | BODY MASS INDEX: 29.99 KG/M2 | HEART RATE: 55 BPM | DIASTOLIC BLOOD PRESSURE: 77 MMHG | RESPIRATION RATE: 16 BRPM

## 2024-05-29 DIAGNOSIS — M54.16 LUMBAR RADICULOPATHY: ICD-10-CM

## 2024-05-29 DIAGNOSIS — Z98.890 HISTORY OF LUMBAR LAMINECTOMY FOR SPINAL CORD DECOMPRESSION: ICD-10-CM

## 2024-05-29 DIAGNOSIS — M47.816 LUMBAR SPONDYLOSIS: Primary | ICD-10-CM

## 2024-06-20 ENCOUNTER — HOSPITAL ENCOUNTER (OUTPATIENT)
Dept: PAIN MEDICINE | Facility: HOSPITAL | Age: 74
Discharge: HOME | End: 2024-06-20
Payer: MEDICARE

## 2024-06-20 VITALS
TEMPERATURE: 97.1 F | SYSTOLIC BLOOD PRESSURE: 130 MMHG | WEIGHT: 215 LBS | HEIGHT: 71 IN | RESPIRATION RATE: 16 BRPM | OXYGEN SATURATION: 95 % | DIASTOLIC BLOOD PRESSURE: 79 MMHG | BODY MASS INDEX: 30.1 KG/M2 | HEART RATE: 52 BPM

## 2024-06-20 DIAGNOSIS — R52 PAIN: ICD-10-CM

## 2024-06-20 DIAGNOSIS — M47.816 LUMBAR SPONDYLOSIS: Primary | ICD-10-CM

## 2024-06-20 PROCEDURE — 77003 FLUOROGUIDE FOR SPINE INJECT: CPT

## 2024-06-20 PROCEDURE — 25010000002 BUPIVACAINE (PF) 0.25 % SOLUTION: Performed by: STUDENT IN AN ORGANIZED HEALTH CARE EDUCATION/TRAINING PROGRAM

## 2024-06-20 RX ORDER — BUPIVACAINE HYDROCHLORIDE 2.5 MG/ML
10 INJECTION, SOLUTION EPIDURAL; INFILTRATION; INTRACAUDAL ONCE
Status: COMPLETED | OUTPATIENT
Start: 2024-06-20 | End: 2024-06-20

## 2024-06-20 RX ORDER — LIDOCAINE HYDROCHLORIDE 10 MG/ML
5 INJECTION, SOLUTION EPIDURAL; INFILTRATION; INTRACAUDAL; PERINEURAL ONCE
Status: COMPLETED | OUTPATIENT
Start: 2024-06-20 | End: 2024-06-20

## 2024-06-20 RX ADMIN — BUPIVACAINE HYDROCHLORIDE 10 ML: 2.5 INJECTION, SOLUTION EPIDURAL; INFILTRATION; INTRACAUDAL; PERINEURAL at 10:08

## 2024-06-20 RX ADMIN — LIDOCAINE HYDROCHLORIDE 5 ML: 10 INJECTION, SOLUTION EPIDURAL; INFILTRATION; INTRACAUDAL; PERINEURAL at 10:07

## 2024-06-20 NOTE — PROCEDURES
PROCEDURE:  Bilateral L 4, 5 and SA MBB diagnostic local only    INDICATION: Patient complains of pain in the lower back, bilateral.  Pain increases on extension of the spine, facet tenderness present.       PROCEDURE DETAILS:   After obtaining written informed consent patient was taken to the procedure room. Pre-procedure blood pressure and pulse were stable and recorded in patients clinic chart. The patient was placed in a prone position and the lower back was prepped with chloraprep and draped in the usual sterile fashion.  The skin was infiltrated with 1% lidocaine at the junction of transverse process with the vertebral body at the left L 4 level. A 22-gauge, 3.5-inch needle was inserted into the lumbar medial branch under radiographic guidance. Both AP and lateral views were obtained.   Following placement of the needle and negative aspiration, 1.2 cc of bupivacaine 0.25%.  The identical procedure was performed on left L5 and SA medial branch.  Identical procedure was repeated on right side at L4, L5, Sa.  A total of 10 cc of 0.25% bupivacaine was injected. During the procedure there was no evidence of CSF, paresthesias or heme.    After the procedure the needles were removed. Skin was cleaned and a sterile dressing was applied.    Following the procedure the patient's vital signs were stable. The patient was discharged home in good condition after being given discharge instructions.    COMPLICATIONS None    Juan J Thomas DO  Pain Management   [facility]

## 2024-06-20 NOTE — H&P
H and P reviewed from previous visit and no changes to patient's clinical presentation. Will proceed with procedure as planned. Patient denies history of DM and being on blood thinners.    Juan J Thomas DO  Pain Management   [facility]

## 2024-06-21 ENCOUNTER — TELEPHONE (OUTPATIENT)
Dept: PAIN MEDICINE | Facility: HOSPITAL | Age: 74
End: 2024-06-21
Payer: MEDICARE

## 2024-06-21 DIAGNOSIS — M47.816 LUMBAR SPONDYLOSIS: Primary | ICD-10-CM

## 2024-06-21 NOTE — TELEPHONE ENCOUNTER
Post procedure phone call completed.  Pt states they are doing good and denies questions or concerns. Pt state having 80-85 % relief and his pain level #3 today.

## 2024-07-15 RX ORDER — LOSARTAN POTASSIUM AND HYDROCHLOROTHIAZIDE 25; 100 MG/1; MG/1
1 TABLET ORAL DAILY
Qty: 90 TABLET | Refills: 3 | Status: SHIPPED | OUTPATIENT
Start: 2024-07-15

## 2024-07-16 ENCOUNTER — HOSPITAL ENCOUNTER (OUTPATIENT)
Dept: PAIN MEDICINE | Facility: HOSPITAL | Age: 74
Discharge: HOME | End: 2024-07-16
Payer: MEDICARE

## 2024-07-16 VITALS
WEIGHT: 215 LBS | BODY MASS INDEX: 30.1 KG/M2 | HEIGHT: 71 IN | SYSTOLIC BLOOD PRESSURE: 115 MMHG | OXYGEN SATURATION: 94 % | DIASTOLIC BLOOD PRESSURE: 76 MMHG | RESPIRATION RATE: 16 BRPM | HEART RATE: 60 BPM | TEMPERATURE: 96.9 F

## 2024-07-16 DIAGNOSIS — M47.816 LUMBAR SPONDYLOSIS: Primary | ICD-10-CM

## 2024-07-16 DIAGNOSIS — R52 PAIN: ICD-10-CM

## 2024-07-16 PROCEDURE — 64636 DESTROY L/S FACET JNT ADDL: CPT | Performed by: STUDENT IN AN ORGANIZED HEALTH CARE EDUCATION/TRAINING PROGRAM

## 2024-07-16 PROCEDURE — 77003 FLUOROGUIDE FOR SPINE INJECT: CPT

## 2024-07-16 PROCEDURE — 64635 DESTROY LUMB/SAC FACET JNT: CPT | Performed by: STUDENT IN AN ORGANIZED HEALTH CARE EDUCATION/TRAINING PROGRAM

## 2024-07-16 RX ORDER — LIDOCAINE HYDROCHLORIDE 10 MG/ML
5 INJECTION, SOLUTION EPIDURAL; INFILTRATION; INTRACAUDAL; PERINEURAL ONCE
Status: COMPLETED | OUTPATIENT
Start: 2024-07-16 | End: 2024-07-16

## 2024-07-16 RX ORDER — LIDOCAINE HYDROCHLORIDE 20 MG/ML
10 INJECTION, SOLUTION INFILTRATION; PERINEURAL ONCE
Status: DISCONTINUED | OUTPATIENT
Start: 2024-07-16 | End: 2024-07-16

## 2024-07-16 RX ADMIN — LIDOCAINE HYDROCHLORIDE 5 ML: 10 INJECTION, SOLUTION EPIDURAL; INFILTRATION; INTRACAUDAL; PERINEURAL at 14:15

## 2024-07-16 NOTE — DISCHARGE INSTRUCTIONS
Radiofrequency Lesioning, Care After    Refer to this sheet in the next few weeks. These instructions provide you with information about caring for yourself after your procedure. Your health care provider may also give you more specific instructions. Your treatment has been planned according to current medical practices, but problems sometimes occur. Call your health care provider if you have any problems or questions after your procedure.  What can I expect after the procedure?  After the procedure, it is common to have:  Pain from the burned nerve.  You may feel a burning sensation for up to 1-2 weeks after the procedure  Temporary numbness at the site  Your leg/legs may be weak or feel numb after the procedure until the numbing medication wears off.  When you are up and walking, have assistance to prevent falling.     Follow these instructions at home:  Take over-the-counter and prescription medicines only as told by your health care provider.  Return to your normal activities as told by your health care provider. Ask your health care provider what activities are safe for you.  Pay close attention to how you feel after the procedure. If you start to have pain, write down when it hurts and how it feels. This will help you and your health care provider to know if you need an additional treatment.  Check your needle insertion site every day for signs of infection. Watch for:  Redness, swelling, or pain.  Fluid, blood, or pus.  Keep all follow-up visits as told by your health care provider. This is important.  No driving for 24 hrs-make sure you have full sensation in your legs prior to driving.  Avoid using heat on the injection site for 24 hours. You may use ice intermittently if needed by placing a               towel between your skin and the ice bag and using the ice for 20 minutes 2-3 times a day.  Do not take baths, swim or use a hot tub for 24 hours.  Leave your band-aids on for 24 hours and keep them  dry.    Contact a health care provider if:  Your pain does not get better.  You have redness, swelling, or pain at the needle insertion site.  You have fluid, blood, or pus coming from the needle insertion site.  You have a fever over 101 degrees.  You have new numb.ness in your arm or leg after the procedure    Get help right away if:  You develop sudden, severe pain.  You develop numbness or tingling near the procedure site that does not go away.  This information is not intended to replace advice given to you by your health care provider. Make sure you discuss any questions you have with your health care provider.  Document Released: 08/16/2012 Document Revised: 05/25/2017 Document Reviewed: 01/25/2016  [employer] Interactive Patient Education © 2019 [employer]

## 2024-07-16 NOTE — H&P
H and P reviewed from previous visit and no changes to patient's clinical presentation. Will proceed with procedure as planned. Patient denies history of DM and being on blood thinners.    Juan J Thomas DO  Pain Management   [facility]

## 2024-07-16 NOTE — PROCEDURES
Preoperative Diagnosis:    Lumbar Spondylosis/ Sacroiliac Joint Dysfunction                                              Postoperative Diagnosis: SAME     PROCEDURE:  Radiofrequency Ablation (RFA) of Lumbar Medial Branch at Bilateral   L4, 5, sacro ala      NOTE:  After obtaining written informed consent patient was taken to the procedure room. Pre-procedure blood pressure and pulse were stable and recorded in patients clinic chart. The patient was placed in a prone position and right lumbar area was prepped with chloraprep times two and draped in the usual sterile fashion.  The skin over the right L 4 transverse process with vertebral body was infiltrated with 1% lidocaine for local anesthesia.  A 20-gauge  mm needle with 5 mm active was inserted into the right L 4 medial branch under radiographic guidance. Both AP and lateral views were obtained. The identical procedure was performed on right L 5 and SA medial branch. Following placement of the needle sensory stimulation at 50 Hz and motor stimulation at 2 Hz was carried out. 1 cc of 2% lidocaine was injected.   Following placement of the needle sensory stimulation at 50 Hz and motor stimulation at 2 Hz was carried out. 1 cc of 2% lidocaine was injected. The RFA was carried out in lesion mode after patient reporting reproduction of pain or sensation in the area of symptoms and denying extremity motor sensations. The settings were 80°C, and 90 seconds. During the procedure no pain was reported in the extremities by the patient.      Similar procedure was repeated at Left L4, L5 and Sa.     After the procedure the needles were removed. Skin was cleaned and a sterile dressing was applied. Following the procedure the patient's vital signs were stable. The patient was discharged.     COMPLICATIONS: None     RFA DATA: In chart     Juan J Thomas DO  Pain Management   [facility]

## 2024-07-17 ENCOUNTER — TELEPHONE (OUTPATIENT)
Dept: PAIN MEDICINE | Facility: HOSPITAL | Age: 74
End: 2024-07-17
Payer: MEDICARE

## 2024-07-18 ENCOUNTER — TELEPHONE (OUTPATIENT)
Dept: PAIN MEDICINE | Facility: CLINIC | Age: 74
End: 2024-07-18
Payer: MEDICARE

## 2024-07-18 RX ORDER — METHYLPREDNISOLONE 4 MG/1
TABLET ORAL
Qty: 21 TABLET | Refills: 0 | Status: SHIPPED | OUTPATIENT
Start: 2024-07-18

## 2024-07-18 NOTE — TELEPHONE ENCOUNTER
Personally called patient and discussed his increased pain today.  He had bilateral RFA done on 7/16/2024.  He bent down today to cut his nails that increased his pain significantly.  Unable to walk without assistance due to severe pain.  No new bowel or bladder incontinence.  No new weakness.  Discussed with patient that pain has increased most likely due to anesthetic wearing off and it should improve over next few days.  I will send a Medrol Dosepak to decrease inflammation around the ablated nerves and also recommended to continue Celebrex along with Tylenol Extra Strength.  Patient understands and agrees with the plan.  He will call me back if his pain worsens.      Juan J Thomas DO  Pain Management   [facility]

## 2024-07-18 NOTE — TELEPHONE ENCOUNTER
HUB ATTEMPTED TO WARM TRANSFER AND WAS UNSUCCESSFUL     Provider:      Caller: MAYUR STEVEN     Relationship to Patient: SELF     Phone Number: [phone] (home)      Reason for Call: PATIENT STATES HE HAD AN RFA DONE ON 07/16/24 AND SINCE HAD HAD INCREASED PAIN. HE CAN HARDLY WALK AND IS USING CRUTCHES. PLEASE ADVISE

## 2024-08-12 NOTE — PROGRESS NOTES
Subjective   Daren Zambrano is a 74 y.o. male is here for follow-up for lower back pain.  Patient was last seen for bilateral MBB RFA minimal relief. Still has significant trouble walking for prolonged period of time. Had to cancel [employer] visit as he is unable to walk.     On last visit:       Lower back pain is 7/10 on VAS, at maximum is 8/10. Pain is sharp, tingling, bumbness in nature. Pain is referred left lateral thigh and left groin intermittently. The pain is constant. The pain is improved by TFESI in past. The pain is worse with twisting, standing.  Chronic numbness bilateral lower extremities.  Pain mostly in the left lateral thigh stopping at the knee. Unable to walk for prolonged period of time.       Previous Injection:   7/16/2024-bilateral RFA MB- minimal relief.   4/14/2024-B/L MBB L4-SA- 80-85% pain relief where he was able to walk up to 3 miles for 5 days.   7/14/2022-left TFESI L4-5, L5-S1 - 50% pain relief.   4/20/2022-left TFESI L4-5 and L5-S1- 50% pain relief; significant functional improvement.       Hx: Referred by Mei Altamirano MD for  lower back pain.  His initial pain started in 2010 and had epidurals with variant degrees of success. His pain progressed to the point where he was barely able to walk and  s/p surgeries as described in PMH in 2019. He used to ride bicycle for 25 miles before pain started. Walks about 2.5 miles/day - slow walk. He would like to get back to his normal activities which he is not able to do due to significant pain.        PHQ-9- 1  SOAPP-0      PMH:   Partial laminectomy of L3-5 with Coflex implant (b/w/ L3-4; L4-5), L4-S1 decompression, left side discectomy by Dr. Mckinney -2019; Hypertension, synovial cyst of popliteal space, SCC of skin LLE, s/p L hip replacement- 2019     Current Medications:   Celebrex 200 mg       Past Medications:  Gabapentin- stopped as neuropathic pain had resolved.   Tizanidine 4 mg qhs PRN. - restless leg syndrome       Past Modalities:  TENS:                                                                          no                                                  Physical Therapy Within The Last 6 Months              No (2019- didn't have relief).   Psychotherapy                                                            no  Massage Therapy                                                       no     Patient Complains Of:  Uro-Fecal Incontinence          no  Weight Gain/Loss                   no  Fever/Chills                             no  Weakness                               Yes (uses cane for balance issues).               Current Outpatient Medications:     amLODIPine (NORVASC) 2.5 MG tablet, Take 1 tablet by mouth Daily., Disp: 90 tablet, Rfl: 3    aspirin 81 MG EC tablet, Take 1 tablet by mouth Daily. Stop 7-22-19 for surgery, Disp: , Rfl:     celecoxib (CeleBREX) 200 MG capsule, Take 1 capsule by mouth 2 (Two) Times a Day., Disp: 180 capsule, Rfl: 1    losartan-hydrochlorothiazide (HYZAAR) 100-25 MG per tablet, Take 1 tablet by mouth Daily., Disp: 90 tablet, Rfl: 3    methylPREDNISolone (MEDROL) 4 MG dose pack, Take as directed on package instructions., Disp: 21 tablet, Rfl: 0    Multiple Vitamins-Minerals (MULTIVITAMIN WITH MINERALS) tablet tablet, Take 1 tablet by mouth Daily. Stop 7-22-19 for surgery, Disp: , Rfl:     Omega-3 Fatty Acids (FISH OIL OMEGA-3 PO), Take 1 tablet by mouth Daily. 1000/300mg.  Stop 7-22-19 for surgery, Disp: , Rfl:     vitamin E 100 UNIT capsule, Take 4 capsules by mouth Daily. Stop 7-22-19 for surgery, Disp: , Rfl:     The following portions of the patient's history were reviewed and updated as appropriate: allergies, current medications, past family history, past medical history, past social history, past surgical history, and problem list.      REVIEW OF PERTINENT MEDICAL DATA    Past Medical History:   Diagnosis Date    Clavicle fracture     Detached retina, left     Dr. Davila     Hypertension     Injury of back     stenosis    Low back pain     Osteoarthritis     Slow to wake up after anesthesia      Past Surgical History:   Procedure Laterality Date    DUPUYTREN CONTRACTURE RELEASE      FOOT SURGERY  1978    TUMOR REMOVED    KNEE ARTHROSCOPY Right     2013 AND 2016    LUMBAR LAMINECTOMY DISCECTOMY DECOMPRESSION Bilateral 2019    Procedure: PDC PARTIAL LAMINECTOMY OF L3-L5 WITH COFLEX IMPLANTATION, L5-S1 DECOMPRESSION LEFT SIDE DISCECTOMY;  Surgeon: Isabel Mckinney MD;  Location: [facility] MAIN OR;  Service: Orthopedic Spine    RESECTION DISTAL CLAVICLE       Family History   Problem Relation Age of Onset    Dementia Mother     Other Mother         knee replacement    Cancer Father      Social History     Socioeconomic History    Marital status:    Tobacco Use    Smoking status: Former     Current packs/day: 0.00     Types: Cigarettes     Quit date: 7/10/1977     Years since quittin.1    Smokeless tobacco: Never   Vaping Use    Vaping status: Never Used   Substance and Sexual Activity    Alcohol use: Yes     Alcohol/week: 1.0 standard drink of alcohol     Types: 1 Cans of beer per week     Comment: Occ.    Drug use: No    Sexual activity: Yes         Review of Systems   Musculoskeletal:  Positive for arthralgias and back pain.         Vitals:    24 0813   BP: 123/65   Pulse: 51   Resp: 16   SpO2: 96%   Weight: 97.7 kg (215 lb 6.4 oz)   PainSc:   7               Objective   Physical Exam  Musculoskeletal:         General: Tenderness present.        Legs:    Neurological:      Deep Tendon Reflexes:      Reflex Scores:       Patellar reflexes are 1+ on the right side and 1+ on the left side.       Achilles reflexes are 1+ on the right side and 1+ on the left side.     Comments: Motor strength 5/5 b/l LE  Sensory intact b/l LE             Imaging Reviewed:  MRI lumbar spine-2024  - Multilevel small Schmorl's endplate protrusion present  - Complex posterior  interspinous device at L3-4 and L4-5  R24-R2-ksakodovfe canal stenosis.  Mild to moderate right neuroforaminal stenosis  L1-2-moderate facet arthropathy and mild canal stenosis.  Moderate left lateral recess stenosis.  Severe left neuroforaminal stenosis  L2-3-disc osteophyte.  Severe facet arthropathy with ligamentum flavum hypertrophy causing severe central canal stenosis effacing CSF around nerve roots.  Mild to moderate right and moderate left neural foraminal stenosis.  Exiting left L2 nerve root may be indented by osteophyte formation as it exits the foramen  L3-4-severe facet arthropathy greatest on the right.  Severe right neural foraminal stenosis.  Moderate left neuroforaminal stenosis.  L4-5-moderate left and moderate to severe right facet arthropathy.  Moderate left neural foraminal stenosis.  Severe right neuroforaminal stenosis  L5-S1-disc bulge with small posterior high annular tear appearance.  Moderate bilateral facet arthropathy.  Severe right and moderate to severe left neural foraminal stenosis.    MRI lumbar spine-5/19/2021  -Postsurgical changes including Coflex placement at the posterior elements of L3-4 and L4-5, presumed to left L5-S1 laminectomy.     -G2-7-vrtapqzanb on left side.  Moderate facet arthropathy.  Moderate to severe left neural foraminal stenosis which has progressed since last exam.  Mild right.  L2-3-severe left and moderate right facet arthropathy.  Moderate central canal stenosis.  Moderate left lateral recess and neural foraminal stenosis.  Mild right.  L3-4-Coflex device between the spinous processes.  Severe facet arthropathy.  Mild central canal stenosis.  Moderate left neural foraminal stenosis.  Severe right neural foraminal stenosis appears to be impinging upon right L3 nerve root within the foramen, appears unchanged.  L4-5-Coflex device.  Severe left and moderate right facet arthropathy.  Mild central canal stenosis.  Severe right neural foraminal stenosis  impinging on right L4 nerve root.  Similar to prior study.  Mild to moderate left neural foraminal narrowing.  Y2-I2-hfsemdyd left-sided laminectomy.  Moderate bilateral facet arthropathy.  Moderate to severe left neural foraminal stenosis and severe right neural foraminal stenosis.            Assessment:    1. Lumbar stenosis with neurogenic claudication    2. Lumbar spondylosis    3. History of lumbar laminectomy for spinal cord decompression            Plan:   1. Defer UDS for now.   2. We discussed trying a course of formal physical therapy.  Physical therapy can help strengthen and stretch the muscles around the joints. Continue to be as active as possible.   3. We had discussion regarding adding gabapentin for nerve pain and benefits and risks. Patient would like to hold off on starting it.   4. Lumbar MRI was reviewed with patient using spinal model and images.  Patient does have worsening of L2-3 stenosis consistent with his symptoms of neurogenic claudication. Patient has pain in the lower back with referred pain in the leg, patient has failed conservative therapy including PT and pharmacological management for more than 6 weeks and pain interferes with activities of daily living. MRI shows severe stenosis at L2-3. Discussed lumbar LORENZA L2-3. Discussed the possibility of infection, bleeding, nerve damage, post dural puncture headache, increased pain, paraplegia. Patient understands and agrees.     We also discussed possibility of returning to surgeon, but patient would like to hold off on any surgeries at this point and use it as a last resort.      RTC for CEE Thomas DO  Pain Management   [facility]         INSPECT REPORT    As part of the patient's treatment plan, I may be prescribing controlled substances. The patient has been made aware of appropriate use of such medications, including potential risk of somnolence, limited ability to drive and/or work safely, and the potential for  dependence or overdose. It has also been made clear that these medications are for use by this patient only, without concomitant use of alcohol or other substances unless prescribed.     Patient has completed prescribing agreement detailing terms of continued prescribing of controlled substances, including monitoring INSPECT reports, urine drug screening, and pill counts if necessary. The patient is aware that inappropriate use will results in cessation of prescribing such medications.    INSPECT report has been reviewed and scanned into the patient's chart.

## 2024-08-14 ENCOUNTER — OFFICE VISIT (OUTPATIENT)
Dept: PAIN MEDICINE | Facility: CLINIC | Age: 74
End: 2024-08-14
Payer: MEDICARE

## 2024-08-14 VITALS
DIASTOLIC BLOOD PRESSURE: 65 MMHG | SYSTOLIC BLOOD PRESSURE: 123 MMHG | RESPIRATION RATE: 16 BRPM | OXYGEN SATURATION: 96 % | BODY MASS INDEX: 30.04 KG/M2 | WEIGHT: 215.4 LBS | HEART RATE: 51 BPM

## 2024-08-14 DIAGNOSIS — M47.816 LUMBAR SPONDYLOSIS: ICD-10-CM

## 2024-08-14 DIAGNOSIS — M48.062 LUMBAR STENOSIS WITH NEUROGENIC CLAUDICATION: Primary | ICD-10-CM

## 2024-08-14 DIAGNOSIS — Z98.890 HISTORY OF LUMBAR LAMINECTOMY FOR SPINAL CORD DECOMPRESSION: ICD-10-CM

## 2024-08-23 ENCOUNTER — HOSPITAL ENCOUNTER (OUTPATIENT)
Dept: PAIN MEDICINE | Facility: HOSPITAL | Age: 74
Discharge: HOME | End: 2024-08-23
Payer: MEDICARE

## 2024-08-23 VITALS
BODY MASS INDEX: 30.1 KG/M2 | HEART RATE: 54 BPM | WEIGHT: 215 LBS | RESPIRATION RATE: 16 BRPM | DIASTOLIC BLOOD PRESSURE: 69 MMHG | TEMPERATURE: 97.1 F | OXYGEN SATURATION: 97 % | SYSTOLIC BLOOD PRESSURE: 125 MMHG | HEIGHT: 71 IN

## 2024-08-23 DIAGNOSIS — M48.062 LUMBAR STENOSIS WITH NEUROGENIC CLAUDICATION: Primary | ICD-10-CM

## 2024-08-23 DIAGNOSIS — R52 PAIN: ICD-10-CM

## 2024-08-23 PROCEDURE — 25510000001 IOPAMIDOL 41 % SOLUTION: Performed by: STUDENT IN AN ORGANIZED HEALTH CARE EDUCATION/TRAINING PROGRAM

## 2024-08-23 PROCEDURE — 77003 FLUOROGUIDE FOR SPINE INJECT: CPT

## 2024-08-23 PROCEDURE — 25010000002 METHYLPREDNISOLONE PER 40 MG: Performed by: STUDENT IN AN ORGANIZED HEALTH CARE EDUCATION/TRAINING PROGRAM

## 2024-08-23 RX ORDER — IOPAMIDOL 408 MG/ML
3 INJECTION, SOLUTION INTRATHECAL
Status: COMPLETED | OUTPATIENT
Start: 2024-08-23 | End: 2024-08-23

## 2024-08-23 RX ORDER — METHYLPREDNISOLONE ACETATE 40 MG/ML
40 INJECTION, SUSPENSION INTRA-ARTICULAR; INTRALESIONAL; INTRAMUSCULAR; SOFT TISSUE ONCE
Status: COMPLETED | OUTPATIENT
Start: 2024-08-23 | End: 2024-08-23

## 2024-08-23 RX ADMIN — METHYLPREDNISOLONE ACETATE 40 MG: 40 INJECTION, SUSPENSION INTRA-ARTICULAR; INTRALESIONAL; INTRAMUSCULAR; INTRASYNOVIAL; SOFT TISSUE at 08:32

## 2024-08-23 RX ADMIN — IOPAMIDOL 3 ML: 408 INJECTION, SOLUTION INTRATHECAL at 08:32

## 2024-08-23 NOTE — DISCHARGE INSTRUCTIONS

## 2024-08-23 NOTE — H&P
H and P reviewed from previous visit and no changes to patient's clinical presentation. Will proceed with procedure as planned. Patient denies history of DM and being on blood thinners.    Juan J Thomas DO  Pain Management   [facility]

## 2024-08-23 NOTE — PROCEDURES
PREOPERATIVE DIAGNOSIS:    1. Lumbar DDD    POSTOPERATIVE DIAGNOSIS: Same    PROCEDURE:  Lumbar epidural steroid injection L 2-3    PROCEDURE NOTE:  After obtaining written informed consent patient was taken to the procedure room. Pre-procedure blood pressure and pulse were stable and recorded in patients clinic chart.     The patient was placed in the prone position. The lower back was prepped with antiseptic solution and draped in the usual sterile fashion.  The skin over the L 2-3 space was identified under fluoroscopic guidance and infiltrated with 1% lidocaine for local anesthesia via 25 gauge needle.  A 20-gauge tuohy needle was used to access the epidural space using loss of resistance to air technique. Following negative aspiration, 2 cc of the omnipaque dye was injected.  There was good spread of the dye from L2-L4 area. A mixture containing  3 ml of saline with 40 mg of depo-medrol was injected. There was no evidence of CSF, paresthesia or vascular spread. The needle was removed. Skin was cleaned and band aid was applied.    Following the procedure the patient's vital signs were stable. The patient was discharged home in good condition after being given discharge instructions.    COMPLICATIONS: None     Juan J Thomas DO  Pain Management   [facility]

## 2024-08-26 ENCOUNTER — TELEPHONE (OUTPATIENT)
Dept: PAIN MEDICINE | Facility: HOSPITAL | Age: 74
End: 2024-08-26
Payer: MEDICARE

## 2024-09-04 NOTE — PROGRESS NOTES
Subjective   Daren Zambrano is a 74 y.o. male is here for follow-up for lower back pain.  Patient was last seen for LESI L2-3 with moderate improvement.    On last visit:       Lower back pain is 6/10 on VAS, at maximum is 6/10. Pain is sharp, tingling, bumbness in nature. Pain is referred left lateral thigh and left groin intermittently. The pain is constant. The pain is improved by TFESI and LESI in past. The pain is worse with twisting, standing.  Chronic numbness bilateral lower extremities.  Pain mostly in the left lateral thigh stopping at the knee. Unable to walk for prolonged period of time.       Previous Injection:   8/14/2024-LESI L2-3- able to do increased activities such as staining his deck for 4 hrs and didn't have significant pain. Unable to give me exact percentage of relief.   7/16/2024-bilateral RFA MB- minimal relief.   4/14/2024-B/L MBB L4-SA- 80-85% pain relief where he was able to walk up to 3 miles for 5 days.   7/14/2022-left TFESI L4-5, L5-S1 - 50% pain relief.   4/20/2022-left TFESI L4-5 and L5-S1- 50% pain relief; significant functional improvement.       Hx: Referred by Mei Altamirano MD for  lower back pain.  His initial pain started in 2010 and had epidurals with variant degrees of success. His pain progressed to the point where he was barely able to walk and  s/p surgeries as described in PMH in 2019. He used to ride bicycle for 25 miles before pain started. Walks about 2.5 miles/day - slow walk. He would like to get back to his normal activities which he is not able to do due to significant pain.        PHQ-9- 1  SOAPP-0      PMH:   Partial laminectomy of L3-5 with Coflex implant (b/w/ L3-4; L4-5), L4-S1 decompression, left side discectomy by Dr. Mckinney -2019; Hypertension, synovial cyst of popliteal space, SCC of skin LLE, s/p L hip replacement- 2019     Current Medications:   Celebrex 200 mg       Past Medications:  Gabapentin- stopped as neuropathic pain had resolved.    Tizanidine 4 mg qhs PRN. - restless leg syndrome      Past Modalities:  TENS:                                                                          no                                                  Physical Therapy Within The Last 6 Months              No (2019- didn't have relief).   Psychotherapy                                                            no  Massage Therapy                                                       no     Patient Complains Of:  Uro-Fecal Incontinence          no  Weight Gain/Loss                   no  Fever/Chills                             no  Weakness                               Yes (uses cane for balance issues).               Current Outpatient Medications:     amLODIPine (NORVASC) 2.5 MG tablet, Take 1 tablet by mouth Daily., Disp: 90 tablet, Rfl: 3    aspirin 81 MG EC tablet, Take 1 tablet by mouth Daily. Stop 7-22-19 for surgery, Disp: , Rfl:     celecoxib (CeleBREX) 200 MG capsule, Take 1 capsule by mouth 2 (Two) Times a Day., Disp: 180 capsule, Rfl: 1    losartan-hydrochlorothiazide (HYZAAR) 100-25 MG per tablet, Take 1 tablet by mouth Daily., Disp: 90 tablet, Rfl: 3    Multiple Vitamins-Minerals (MULTIVITAMIN WITH MINERALS) tablet tablet, Take 1 tablet by mouth Daily. Stop 7-22-19 for surgery, Disp: , Rfl:     Omega-3 Fatty Acids (FISH OIL OMEGA-3 PO), Take 1 tablet by mouth Daily. 1000/300mg.  Stop 7-22-19 for surgery, Disp: , Rfl:     vitamin E 100 UNIT capsule, Take 4 capsules by mouth Daily. Stop 7-22-19 for surgery, Disp: , Rfl:     The following portions of the patient's history were reviewed and updated as appropriate: allergies, current medications, past family history, past medical history, past social history, past surgical history, and problem list.      REVIEW OF PERTINENT MEDICAL DATA    Past Medical History:   Diagnosis Date    Clavicle fracture     Detached retina, left     Dr. Davila    Hypertension     Injury of back     stenosis    Low back pain      Osteoarthritis     Slow to wake up after anesthesia      Past Surgical History:   Procedure Laterality Date    DUPUYTREN CONTRACTURE RELEASE      FOOT SURGERY  1978    TUMOR REMOVED    KNEE ARTHROSCOPY Right     2013 AND 2016    LUMBAR LAMINECTOMY DISCECTOMY DECOMPRESSION Bilateral 2019    Procedure: PDC PARTIAL LAMINECTOMY OF L3-L5 WITH COFLEX IMPLANTATION, L5-S1 DECOMPRESSION LEFT SIDE DISCECTOMY;  Surgeon: Isabel Mckinney MD;  [facility];  Service: Orthopedic Spine    RESECTION DISTAL CLAVICLE       Family History   Problem Relation Age of Onset    Dementia Mother     Other Mother         knee replacement    Cancer Father      Social History     Socioeconomic History    Marital status:    Tobacco Use    Smoking status: Former     Current packs/day: 0.00     Types: Cigarettes     Quit date: 7/10/1977     Years since quittin.2    Smokeless tobacco: Never   Vaping Use    Vaping status: Never Used   Substance and Sexual Activity    Alcohol use: Yes     Alcohol/week: 1.0 standard drink of alcohol     Types: 1 Cans of beer per week     Comment: Occ.    Drug use: No    Sexual activity: Yes         Review of Systems   Musculoskeletal:  Positive for arthralgias and back pain.         Vitals:    24 0800   BP: 125/75   Pulse: 54   Resp: 16   SpO2: 97%   Weight: 98.5 kg (217 lb 3.2 oz)   PainSc:   6                 Objective   Physical Exam  Musculoskeletal:         General: Tenderness present.        Legs:    Neurological:      Deep Tendon Reflexes:      Reflex Scores:       Patellar reflexes are 1+ on the right side and 1+ on the left side.       Achilles reflexes are 1+ on the right side and 1+ on the left side.     Comments: Motor strength 5/5 b/l LE  Sensory intact b/l LE             Imaging Reviewed:  MRI lumbar spine-2024  - Multilevel small Schmorl's endplate protrusion present  - Complex posterior interspinous device at L3-4 and L4-5  [id number]-tbjcjejxeb canal stenosis.   Mild to moderate right neuroforaminal stenosis  L1-2-moderate facet arthropathy and mild canal stenosis.  Moderate left lateral recess stenosis.  Severe left neuroforaminal stenosis  L2-3-disc osteophyte.  Severe facet arthropathy with ligamentum flavum hypertrophy causing severe central canal stenosis effacing CSF around nerve roots.  Mild to moderate right and moderate left neural foraminal stenosis.  Exiting left L2 nerve root may be indented by osteophyte formation as it exits the foramen  L3-4-severe facet arthropathy greatest on the right.  Severe right neural foraminal stenosis.  Moderate left neuroforaminal stenosis.  L4-5-moderate left and moderate to severe right facet arthropathy.  Moderate left neural foraminal stenosis.  Severe right neuroforaminal stenosis  L5-S1-disc bulge with small posterior high annular tear appearance.  Moderate bilateral facet arthropathy.  Severe right and moderate to severe left neural foraminal stenosis.    MRI lumbar spine-5/19/2021  -Postsurgical changes including Coflex placement at the posterior elements of L3-4 and L4-5, presumed to left L5-S1 laminectomy.     -O0-9-mlxbxicgoh on left side.  Moderate facet arthropathy.  Moderate to severe left neural foraminal stenosis which has progressed since last exam.  Mild right.  L2-3-severe left and moderate right facet arthropathy.  Moderate central canal stenosis.  Moderate left lateral recess and neural foraminal stenosis.  Mild right.  L3-4-Coflex device between the spinous processes.  Severe facet arthropathy.  Mild central canal stenosis.  Moderate left neural foraminal stenosis.  Severe right neural foraminal stenosis appears to be impinging upon right L3 nerve root within the foramen, appears unchanged.  L4-5-Coflex device.  Severe left and moderate right facet arthropathy.  Mild central canal stenosis.  Severe right neural foraminal stenosis impinging on right L4 nerve root.  Similar to prior study.  Mild to moderate left  neural foraminal narrowing.  X4-I2-wyoozmsl left-sided laminectomy.  Moderate bilateral facet arthropathy.  Moderate to severe left neural foraminal stenosis and severe right neural foraminal stenosis.            Assessment:    1. Lumbar stenosis with neurogenic claudication    2. Lumbar spondylosis    3. History of lumbar laminectomy for spinal cord decompression          Plan:   1. Defer UDS for now.   2. We discussed trying a course of formal physical therapy.  Physical therapy can help strengthen and stretch the muscles around the joints. Continue to be as active as possible.   3. We had discussion regarding adding gabapentin for nerve pain and benefits and risks. Patient would like to hold off on starting it.   4. Lumbar MRI was reviewed with patient using spinal model and images.  Patient does have worsening of L2-3 stenosis consistent with his symptoms of neurogenic claudication. Some relief with LESI. We also discussed possibility of returning to surgeon, but patient would like to hold off on any surgeries at this point and use it as a last resort. Discussed red flag symptoms that need ED visit including bowel, bladder incontinence, saddle anesthesia, weakness in legs. Patient understands and agrees with plan      RTC PRN.      Juan J Thomas DO  Pain Management   [facility]         INSPECT REPORT    As part of the patient's treatment plan, I may be prescribing controlled substances. The patient has been made aware of appropriate use of such medications, including potential risk of somnolence, limited ability to drive and/or work safely, and the potential for dependence or overdose. It has also been made clear that these medications are for use by this patient only, without concomitant use of alcohol or other substances unless prescribed.     Patient has completed prescribing agreement detailing terms of continued prescribing of controlled substances, including monitoring INSPECT reports, urine drug  screening, and pill counts if necessary. The patient is aware that inappropriate use will results in cessation of prescribing such medications.    INSPECT report has been reviewed and scanned into the patient's chart.

## 2024-09-09 ENCOUNTER — OFFICE VISIT (OUTPATIENT)
Dept: PAIN MEDICINE | Facility: CLINIC | Age: 74
End: 2024-09-09
Payer: MEDICARE

## 2024-09-09 VITALS
RESPIRATION RATE: 16 BRPM | WEIGHT: 217.2 LBS | BODY MASS INDEX: 30.29 KG/M2 | OXYGEN SATURATION: 97 % | DIASTOLIC BLOOD PRESSURE: 75 MMHG | SYSTOLIC BLOOD PRESSURE: 125 MMHG | HEART RATE: 54 BPM

## 2024-09-09 DIAGNOSIS — M47.816 LUMBAR SPONDYLOSIS: ICD-10-CM

## 2024-09-09 DIAGNOSIS — M48.062 LUMBAR STENOSIS WITH NEUROGENIC CLAUDICATION: Primary | ICD-10-CM

## 2024-09-09 DIAGNOSIS — Z98.890 HISTORY OF LUMBAR LAMINECTOMY FOR SPINAL CORD DECOMPRESSION: ICD-10-CM

## 2024-09-09 PROCEDURE — 3078F DIAST BP <80 MM HG: CPT | Performed by: STUDENT IN AN ORGANIZED HEALTH CARE EDUCATION/TRAINING PROGRAM

## 2024-09-09 PROCEDURE — 3074F SYST BP LT 130 MM HG: CPT | Performed by: STUDENT IN AN ORGANIZED HEALTH CARE EDUCATION/TRAINING PROGRAM

## 2024-09-09 PROCEDURE — 1159F MED LIST DOCD IN RCRD: CPT | Performed by: STUDENT IN AN ORGANIZED HEALTH CARE EDUCATION/TRAINING PROGRAM

## 2024-09-09 PROCEDURE — 1160F RVW MEDS BY RX/DR IN RCRD: CPT | Performed by: STUDENT IN AN ORGANIZED HEALTH CARE EDUCATION/TRAINING PROGRAM

## 2024-09-09 PROCEDURE — 99213 OFFICE O/P EST LOW 20 MIN: CPT | Performed by: STUDENT IN AN ORGANIZED HEALTH CARE EDUCATION/TRAINING PROGRAM

## 2024-09-09 PROCEDURE — 1125F AMNT PAIN NOTED PAIN PRSNT: CPT | Performed by: STUDENT IN AN ORGANIZED HEALTH CARE EDUCATION/TRAINING PROGRAM

## 2024-09-24 RX ORDER — CELECOXIB 200 MG/1
200 CAPSULE ORAL 2 TIMES DAILY
Qty: 180 CAPSULE | Refills: 3 | Status: SHIPPED | OUTPATIENT
Start: 2024-09-24

## 2024-12-09 DIAGNOSIS — I10 ESSENTIAL HYPERTENSION: ICD-10-CM

## 2024-12-09 RX ORDER — AMLODIPINE BESYLATE 2.5 MG/1
2.5 TABLET ORAL DAILY
Qty: 90 TABLET | Refills: 1 | Status: SHIPPED | OUTPATIENT
Start: 2024-12-09

## 2025-02-06 ENCOUNTER — OFFICE VISIT (OUTPATIENT)
Dept: FAMILY MEDICINE CLINIC | Facility: CLINIC | Age: 75
End: 2025-02-06
Payer: MEDICARE

## 2025-02-06 ENCOUNTER — LAB (OUTPATIENT)
Dept: FAMILY MEDICINE CLINIC | Facility: CLINIC | Age: 75
End: 2025-02-06
Payer: MEDICARE

## 2025-02-06 VITALS
OXYGEN SATURATION: 97 % | DIASTOLIC BLOOD PRESSURE: 78 MMHG | SYSTOLIC BLOOD PRESSURE: 132 MMHG | BODY MASS INDEX: 30.74 KG/M2 | RESPIRATION RATE: 18 BRPM | HEIGHT: 71 IN | HEART RATE: 58 BPM | WEIGHT: 219.6 LBS

## 2025-02-06 DIAGNOSIS — E78.41 ELEVATED LIPOPROTEIN(A): ICD-10-CM

## 2025-02-06 DIAGNOSIS — Z12.5 PROSTATE CANCER SCREENING: ICD-10-CM

## 2025-02-06 DIAGNOSIS — H43.819 POSTERIOR VITREOUS DETACHMENT, UNSPECIFIED LATERALITY: ICD-10-CM

## 2025-02-06 DIAGNOSIS — Z00.00 MEDICARE ANNUAL WELLNESS VISIT, SUBSEQUENT: Primary | ICD-10-CM

## 2025-02-06 DIAGNOSIS — I10 ESSENTIAL HYPERTENSION: ICD-10-CM

## 2025-02-06 DIAGNOSIS — M54.50 CHRONIC BILATERAL LOW BACK PAIN WITHOUT SCIATICA: ICD-10-CM

## 2025-02-06 DIAGNOSIS — G89.29 CHRONIC BILATERAL LOW BACK PAIN WITHOUT SCIATICA: ICD-10-CM

## 2025-02-06 DIAGNOSIS — M16.12 PRIMARY OSTEOARTHRITIS OF LEFT HIP: ICD-10-CM

## 2025-02-06 DIAGNOSIS — Z13.0 SCREENING FOR DEFICIENCY ANEMIA: ICD-10-CM

## 2025-02-06 LAB
ANION GAP SERPL CALCULATED.3IONS-SCNC: 9.6 MMOL/L (ref 5–15)
BASOPHILS # BLD AUTO: 0.04 10*3/MM3 (ref 0–0.2)
BASOPHILS NFR BLD AUTO: 0.9 % (ref 0–1.5)
BUN SERPL-MCNC: 19 MG/DL (ref 8–23)
BUN/CREAT SERPL: 17.8 (ref 7–25)
CALCIUM SPEC-SCNC: 10.5 MG/DL (ref 8.6–10.5)
CHLORIDE SERPL-SCNC: 102 MMOL/L (ref 98–107)
CHOLEST SERPL-MCNC: 200 MG/DL (ref 0–200)
CO2 SERPL-SCNC: 28.4 MMOL/L (ref 22–29)
CREAT SERPL-MCNC: 1.07 MG/DL (ref 0.76–1.27)
DEPRECATED RDW RBC AUTO: 41 FL (ref 37–54)
EGFRCR SERPLBLD CKD-EPI 2021: 72.8 ML/MIN/1.73
EOSINOPHIL # BLD AUTO: 0.31 10*3/MM3 (ref 0–0.4)
EOSINOPHIL NFR BLD AUTO: 7 % (ref 0.3–6.2)
ERYTHROCYTE [DISTWIDTH] IN BLOOD BY AUTOMATED COUNT: 12.5 % (ref 12.3–15.4)
GLUCOSE SERPL-MCNC: 106 MG/DL (ref 65–99)
HCT VFR BLD AUTO: 45.8 % (ref 37.5–51)
HDLC SERPL-MCNC: 46 MG/DL (ref 40–60)
HGB BLD-MCNC: 15.9 G/DL (ref 13–17.7)
IMM GRANULOCYTES # BLD AUTO: 0.01 10*3/MM3 (ref 0–0.05)
IMM GRANULOCYTES NFR BLD AUTO: 0.2 % (ref 0–0.5)
LDLC SERPL CALC-MCNC: 137 MG/DL (ref 0–100)
LDLC/HDLC SERPL: 2.93 {RATIO}
LYMPHOCYTES # BLD AUTO: 1.31 10*3/MM3 (ref 0.7–3.1)
LYMPHOCYTES NFR BLD AUTO: 29.6 % (ref 19.6–45.3)
MCH RBC QN AUTO: 31.7 PG (ref 26.6–33)
MCHC RBC AUTO-ENTMCNC: 34.7 G/DL (ref 31.5–35.7)
MCV RBC AUTO: 91.4 FL (ref 79–97)
MONOCYTES # BLD AUTO: 0.51 10*3/MM3 (ref 0.1–0.9)
MONOCYTES NFR BLD AUTO: 11.5 % (ref 5–12)
NEUTROPHILS NFR BLD AUTO: 2.24 10*3/MM3 (ref 1.7–7)
NEUTROPHILS NFR BLD AUTO: 50.8 % (ref 42.7–76)
PLATELET # BLD AUTO: 168 10*3/MM3 (ref 140–450)
PMV BLD AUTO: 13.5 FL (ref 6–12)
POTASSIUM SERPL-SCNC: 4.4 MMOL/L (ref 3.5–5.2)
PSA SERPL-MCNC: 1.29 NG/ML (ref 0–4)
RBC # BLD AUTO: 5.01 10*6/MM3 (ref 4.14–5.8)
SODIUM SERPL-SCNC: 140 MMOL/L (ref 136–145)
TRIGL SERPL-MCNC: 96 MG/DL (ref 0–150)
VLDLC SERPL-MCNC: 17 MG/DL (ref 5–40)
WBC NRBC COR # BLD AUTO: 4.42 10*3/MM3 (ref 3.4–10.8)

## 2025-02-06 PROCEDURE — 3075F SYST BP GE 130 - 139MM HG: CPT | Performed by: INTERNAL MEDICINE

## 2025-02-06 PROCEDURE — 36415 COLL VENOUS BLD VENIPUNCTURE: CPT

## 2025-02-06 PROCEDURE — 85025 COMPLETE CBC W/AUTO DIFF WBC: CPT | Performed by: INTERNAL MEDICINE

## 2025-02-06 PROCEDURE — 80061 LIPID PANEL: CPT | Performed by: INTERNAL MEDICINE

## 2025-02-06 PROCEDURE — 99214 OFFICE O/P EST MOD 30 MIN: CPT | Performed by: INTERNAL MEDICINE

## 2025-02-06 PROCEDURE — 3078F DIAST BP <80 MM HG: CPT | Performed by: INTERNAL MEDICINE

## 2025-02-06 PROCEDURE — G0439 PPPS, SUBSEQ VISIT: HCPCS | Performed by: INTERNAL MEDICINE

## 2025-02-06 PROCEDURE — 1170F FXNL STATUS ASSESSED: CPT | Performed by: INTERNAL MEDICINE

## 2025-02-06 PROCEDURE — 80048 BASIC METABOLIC PNL TOTAL CA: CPT | Performed by: INTERNAL MEDICINE

## 2025-02-06 PROCEDURE — 1125F AMNT PAIN NOTED PAIN PRSNT: CPT | Performed by: INTERNAL MEDICINE

## 2025-02-06 PROCEDURE — G0103 PSA SCREENING: HCPCS | Performed by: INTERNAL MEDICINE

## 2025-02-06 NOTE — PROGRESS NOTES
Chief Complaint  Medicare Wellness-subsequent    HPI:    Daren Zambrano presents to [facility]    Patient is a 74-year-old male presenting for annual Medicare wellness visit.    Hypertension  Blood pressure has been generally well controlled on current regimen of losartan-hydrochlorothiazide 100-25 mg daily and amlodipine 2.5 mg daily.  Tolerates medications well without side effects.  Denies headaches, blurry vision, dizziness, chest pain, shortness of breath, or palpitations.  Diet and exercise could be better.      Hyperlipidemia  Not currently on statin and has not been previously been on statin.      Spinal stenosis  Follows with pain management for chronic back pain with referred pain to the left lateral thigh and left groin intermittently.  Also has chronic numbness in lower extremities bilaterally.  Patient has previously undergone physical therapy, steroid injections, ablation, and previously had spine surgery 2019.  Symptoms overall stable but does have significant reduction in ADLs.      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  Follows with dermatology for routine skin checks.    Previous history of retinal tear status post lasering and subsequently had retinal detachment that was repaired.  Vision improved after procedure, although not back to previous baseline.     Preventative:     Social: Retired teacher     Diet and Exercise: Diet overall pretty good other than around the holidays, exercise could be better     Alcohol, Tobacco, and Recreational Drug use: occasional beer     Cancer screenings:  PSA: Most recent PSA 1.01 on 12/29/2023.  Colonoscopy: Most recent colonoscopy 8/18/2022 which showed internal and external hemorrhoids and mild diverticulosis of the descending and sigmoid colon; no recall for colonoscopy recommended in future    Immunizations: Up to date; recently got flu and RSV. Declines [facility]  "Directive: Not on file      Review of Systems:  ROS negative unless otherwise noted in HPI above.    Past Medical History:   Diagnosis Date    Clavicle fracture     Detached retina, left     Dr. Davila    Hypertension     Injury of back     stenosis    Low back pain     Osteoarthritis     Slow to wake up after anesthesia          Current Outpatient Medications:     amLODIPine (NORVASC) 2.5 MG tablet, TAKE 1 TABLET EVERY DAY, Disp: 90 tablet, Rfl: 1    aspirin 81 MG EC tablet, Take 1 tablet by mouth Daily. Stop 19 for surgery, Disp: , Rfl:     celecoxib (CeleBREX) 200 MG capsule, TAKE 1 CAPSULE TWICE DAILY, Disp: 180 capsule, Rfl: 3    losartan-hydrochlorothiazide (HYZAAR) 100-25 MG per tablet, Take 1 tablet by mouth Daily., Disp: 90 tablet, Rfl: 3    Multiple Vitamins-Minerals (MULTIVITAMIN WITH MINERALS) tablet tablet, Take 1 tablet by mouth Daily. Stop 19 for surgery, Disp: , Rfl:     Omega-3 Fatty Acids (FISH OIL OMEGA-3 PO), Take 1 tablet by mouth Daily. 1000/300mg.  Stop 19 for surgery, Disp: , Rfl:     vitamin E 100 UNIT capsule, Take 4 capsules by mouth Daily. Stop 19 for surgery, Disp: , Rfl:     Social History     Socioeconomic History    Marital status:    Tobacco Use    Smoking status: Former     Current packs/day: 0.00     Types: Cigarettes     Quit date: 7/10/1977     Years since quittin.6    Smokeless tobacco: Never   Vaping Use    Vaping status: Never Used   Substance and Sexual Activity    Alcohol use: Yes     Alcohol/week: 1.0 standard drink of alcohol     Types: 1 Cans of beer per week     Comment: Occ.    Drug use: No    Sexual activity: Yes        Objective   Vital Signs:  /78   Pulse 58   Resp 18   Ht 180.3 cm (71\")   Wt 99.6 kg (219 lb 9.6 oz)   SpO2 97%   BMI 30.63 kg/m²   Estimated body mass index is 30.63 kg/m² as calculated from the following:    Height as of this encounter: 180.3 cm (71\").    Weight as of this encounter: 99.6 kg (219 lb 9.6 " oz).    Physical Exam:  General: Well-appearing patient, no apparent distress  HEENT: No posterior pharynx erythema, no tonsillar erythema or exudates  Cardiac: Regular rate and rhythm, normal S1/S2, no murmur, rubs or gallops, no lower extremity edema  Lungs: Clear to auscultation bilaterally, no crackles or wheezes  Abdomen: Soft, non-tender, no guarding or rebound tenderness, no hepatosplenomegaly  Skin: No significant rashes or lesions  MSK: Grossly normal tone and strength, no point tenderness over cervical, thoracic, or lumbar spinous processes or paraspinal muscles, 5 out of 5 strength in lower extremities bilaterally  Neuro: Alert and oriented x3, CN II-XII grossly intact, normal sensation in upper and lower extremities bilaterally  Psych: Appropriate mood and affect    Assessment and Plan:    (Z00.00) Medicare annual wellness visit, subsequent  Patient is a 74 year old male who is overall doing well. Reviewed social and family history. Encouraged increased healthy diet and exercise and discussed importance to overall health. Up to date with age and gender appropriate cancer screenings. Discussed indicated vaccines based on age and comorbidities. No skin, mood concerns..   Plan:  - Encourage healthy diet and exercise  - Up date vaccines, if necessary  - Screening labs as ordered  - Encourage future advanced health care directive     (I10) Essential hypertension -  Assessment: Blood pressure well controlled on current regimen. Discussed importance of healthy diet and exercise.  Plan:  - Basic metabolic panel, CBC & Differential  - Continue current medications as prescribed  - Intermittently monitor home blood pressures and follow up if elevated  - Encourage healthy diet and exercise    (E78.41) Elevated lipoprotein(a)  Assessment: Not currently on statin.  Discussed importance of healthy diet and exercise.  Plan:  - Fasting lipid panel  - Consider statin based on lipids and 10-year ASCVD risk  - Discussed  healthy diet and lifestyle     (M54.50,  G89.29) Chronic bilateral low back pain without sciatica -   Assessment: Patient with chronic low back pain that has persisted despite prior surgery and multiple conservative treatment abnormalities including OTC analgesia, PT, steroid injections.  Patient interested in following up with surgical specialist to see if any additional treatment offered.  Discussed potential risks and benefits of pursuing additional back surgery.  Patient does have some neuropathy but not interested in pursuing medications at this time.  Plan:  -  Ambulatory Referral to Neurosurgery  - Consider adding gabapentin, if patient interested    (H43.819) Posterior vitreous detachment, unspecified laterality  Assessment: Follows regularly with ophthalmology.  Plan:   - Follow-up with ophthalmologist as scheduled    (Z12.5) Prostate cancer screening - Plan: PSA SCREENING    (Z13.0) Screening for deficiency anemia - Plan: CBC & Differential      Patient was given instructions and counseling regarding his condition or for health maintenance advice. Please see specific information pulled into the AVS if appropriate.       Dr Rolf Onofre   Internal Medicine Physician  [facility]  [address], [address]  [place] [address]

## 2025-02-06 NOTE — PROGRESS NOTES
Subjective   The ABCs of the Annual Wellness Visit  Medicare Wellness Visit      Daren Zambrano is a 74 y.o. patient who presents for a Medicare Wellness Visit.    The following portions of the patient's history were reviewed and   updated as appropriate: allergies, current medications, past family history, past medical history, past social history, past surgical history, and problem list.    Compared to one year ago, the patient's physical   health is worse.  Compared to one year ago, the patient's mental   health is the same.    Recent Hospitalizations:  He was not admitted to the hospital during the last year.     Current Medical Providers:  Patient Care Team:  Rolf Onofre MD as PCP - General (Internal Medicine)  Juan J Thomas DO as Anesthesiologist (Pain Medicine)  Hardik Case MD as Consulting Physician (Ophthalmology)  Evin Sol MD as Consulting Physician (Hand Surgery)    Outpatient Medications Prior to Visit   Medication Sig Dispense Refill    amLODIPine (NORVASC) 2.5 MG tablet TAKE 1 TABLET EVERY DAY 90 tablet 1    aspirin 81 MG EC tablet Take 1 tablet by mouth Daily. Stop 7-22-19 for surgery      celecoxib (CeleBREX) 200 MG capsule TAKE 1 CAPSULE TWICE DAILY 180 capsule 3    losartan-hydrochlorothiazide (HYZAAR) 100-25 MG per tablet Take 1 tablet by mouth Daily. 90 tablet 3    Multiple Vitamins-Minerals (MULTIVITAMIN WITH MINERALS) tablet tablet Take 1 tablet by mouth Daily. Stop 7-22-19 for surgery      Omega-3 Fatty Acids (FISH OIL OMEGA-3 PO) Take 1 tablet by mouth Daily. 1000/300mg.  Stop 7-22-19 for surgery      vitamin E 100 UNIT capsule Take 4 capsules by mouth Daily. Stop 7-22-19 for surgery       No facility-administered medications prior to visit.     No opioid medication identified on active medication list. I have reviewed chart for other potential  high risk medication/s and harmful drug interactions in the elderly.      Aspirin is on active medication list.  "Aspirin use is indicated based on review of current medical condition/s. Pros and cons of this therapy have been discussed today. Benefits of this medication outweigh potential harm.  Patient has been encouraged to continue taking this medication.  .      Patient Active Problem List   Diagnosis    Spinal stenosis    Osteoarthritis    Sciatica    Transition of care performed with sharing of clinical summary    Primary osteoarthritis of left hip    Medicare annual wellness visit, subsequent    Hypertension    Encounter for general adult medical examination without abnormal findings    Synovial cyst of popliteal space    Primary osteoarthritis of left hip    Vitamin D deficiency    Elevated lipoprotein(a)    Screening for prostate cancer    Squamous cell carcinoma of skin of left lower extremity    COVID-19 vaccine administered    Screening for colon cancer    Low back pain    Essential hypertension    Pseudophakia of left eye    Posterior vitreous detachment    PCO (posterior capsular opacification), bilateral    Nonexudative age-related macular degeneration    RPE (retinal pigment epithelium) atrophy    Macular RPE mottling    Horseshoe retinal tear, left eye    Epiretinal membrane (ERM) of both eyes    Strabismus    Vitreous hemorrhage of left eye     Advance Care Planning Advance Directive is not on file.  ACP discussion was held with the patient during this visit. Patient does not have an advance directive, declines further assistance.            Objective   Vitals:    02/06/25 0802   BP: 132/78   Pulse: 58   Resp: 18   SpO2: 97%   Weight: 99.6 kg (219 lb 9.6 oz)   Height: 180.3 cm (71\")   PainSc:   8       Estimated body mass index is 30.63 kg/m² as calculated from the following:    Height as of this encounter: 180.3 cm (71\").    Weight as of this encounter: 99.6 kg (219 lb 9.6 oz).    BMI is >= 30 and <35. (Class 1 Obesity). The following options were offered after discussion;: Encouraged healthy diet and " exercise    Gait and Balance Evaluation: Uses cane for assistance      Does the patient have evidence of cognitive impairment? No                                                                                                Health  Risk Assessment    Smoking Status:  Social History     Tobacco Use   Smoking Status Former    Current packs/day: 0.00    Types: Cigarettes    Quit date: 7/10/1977    Years since quittin.6   Smokeless Tobacco Never     Alcohol Consumption:  Social History     Substance and Sexual Activity   Alcohol Use Yes    Alcohol/week: 1.0 standard drink of alcohol    Types: 1 Cans of beer per week    Comment: Occ.       Fall Risk Screen  STEADI Fall Risk Assessment was completed, and patient is at LOW risk for falls.Assessment completed on:2025    Depression Screening   Little interest or pleasure in doing things? Not at all   Feeling down, depressed, or hopeless? Not at all   PHQ-2 Total Score 0      Health Habits and Functional and Cognitive Screenin/6/2025     8:06 AM   Functional & Cognitive Status   Do you have difficulty preparing food and eating? No   Do you have difficulty bathing yourself, getting dressed or grooming yourself? No   Do you have difficulty using the toilet? No   Do you have difficulty moving around from place to place? No   Do you have trouble with steps or getting out of a bed or a chair? No   Current Diet Well Balanced Diet   Dental Exam Up to date   Eye Exam Up to date   Exercise (times per week) Other   Current Exercises Include Other;Walking   Do you need help using the phone?  No   Are you deaf or do you have serious difficulty hearing?  No   Do you need help to go to places out of walking distance? No   Do you need help shopping? No   Do you need help preparing meals?  No   Do you need help with housework?  No   Do you need help with laundry? No   Do you need help taking your medications? No   Do you need help managing money? No   Do you ever drive or  ride in a car without wearing a seat belt? No   Have you felt unusual stress, anger or loneliness in the last month? No   Who do you live with? Spouse   If you need help, do you have trouble finding someone available to you? No   Have you been bothered in the last four weeks by sexual problems? No   Do you have difficulty concentrating, remembering or making decisions? No           Age-appropriate Screening Schedule:  Refer to the list below for future screening recommendations based on patient's age, sex and/or medical conditions. Orders for these recommended tests are listed in the plan section. The patient has been provided with a written plan.    Health Maintenance List  Health Maintenance   Topic Date Due    BMI FOLLOWUP  12/14/2023    LIPID PANEL  12/29/2024    COVID-19 Vaccine (5 - 2024-25 season) 04/28/2025 (Originally 9/1/2024)    ANNUAL WELLNESS VISIT  02/06/2026    TDAP/TD VACCINES (4 - Td or Tdap) 06/01/2032    COLORECTAL CANCER SCREENING  08/18/2032    INFLUENZA VACCINE  Completed    Pneumococcal Vaccine 65+  Completed    AAA SCREEN ONCE  Completed    ZOSTER VACCINE  Completed    HEPATITIS C SCREENING  Discontinued                                                                                                                                                CMS Preventative Services Quick Reference  Risk Factors Identified During Encounter  None Identified    The above risks/problems have been discussed with the patient.  Pertinent information has been shared with the patient in the After Visit Summary.  An After Visit Summary and PPPS were made available to the patient.    Follow Up:  Next Medicare Wellness visit to be scheduled in 1 year.     Dr Rolf Onofre   Internal Medicine Physician  [facility]  [address], [address]  [place] [address]

## 2025-02-06 NOTE — PATIENT INSTRUCTIONS
Please stop at lab on second floor to have blood drawn    Up to date with vaccines and cancer screenings    Medications:  Continue medications as prescribed    Follow up with neurosurgery as scheduled    Encourage healthy diet and activity    Follow up in one year or sooner if something arises

## 2025-02-07 ENCOUNTER — TELEPHONE (OUTPATIENT)
Dept: FAMILY MEDICINE CLINIC | Facility: CLINIC | Age: 75
End: 2025-02-07
Payer: MEDICARE

## 2025-02-07 PROBLEM — R73.03 PREDIABETES: Status: ACTIVE | Noted: 2025-02-07

## 2025-02-07 PROBLEM — Z78.9 TRANSITION OF CARE PERFORMED WITH SHARING OF CLINICAL SUMMARY: Status: RESOLVED | Noted: 2019-08-17 | Resolved: 2025-02-07

## 2025-02-07 PROBLEM — Z12.5 SCREENING FOR PROSTATE CANCER: Status: RESOLVED | Noted: 2020-12-06 | Resolved: 2025-02-07

## 2025-02-07 PROBLEM — Z00.00 MEDICARE ANNUAL WELLNESS VISIT, SUBSEQUENT: Status: RESOLVED | Noted: 2019-11-26 | Resolved: 2025-02-07

## 2025-02-07 PROBLEM — I10 ESSENTIAL HYPERTENSION: Status: RESOLVED | Noted: 2022-12-14 | Resolved: 2025-02-07

## 2025-02-07 PROBLEM — Z12.11 SCREENING FOR COLON CANCER: Status: RESOLVED | Noted: 2022-06-08 | Resolved: 2025-02-07

## 2025-02-07 RX ORDER — ATORVASTATIN CALCIUM 20 MG/1
20 TABLET, FILM COATED ORAL DAILY
Qty: 90 TABLET | Refills: 3 | Status: SHIPPED | OUTPATIENT
Start: 2025-02-07

## 2025-02-07 NOTE — TELEPHONE ENCOUNTER
Caller: Daren Zambrano    Relationship: Self    Best call back number: [phone]    What medication are you requesting: EITHER OF THE STATIN DR. DUGGAN RECOMMENDS    What are your current symptoms:     How long have you been experiencing symptoms:     Have you had these symptoms before:    [x] Yes  [] No    Have you been treated for these symptoms before:   [x] Yes  [] No    If a prescription is needed, what is your preferred pharmacy and phone number: [facility] - [facility] [address] - [phone]  - [phone]

## 2025-06-18 DIAGNOSIS — I10 ESSENTIAL HYPERTENSION: ICD-10-CM

## 2025-06-18 RX ORDER — LOSARTAN POTASSIUM AND HYDROCHLOROTHIAZIDE 25; 100 MG/1; MG/1
1 TABLET ORAL DAILY
Qty: 90 TABLET | Refills: 3 | Status: SHIPPED | OUTPATIENT
Start: 2025-06-18

## 2025-06-18 RX ORDER — AMLODIPINE BESYLATE 2.5 MG/1
2.5 TABLET ORAL DAILY
Qty: 90 TABLET | Refills: 3 | Status: SHIPPED | OUTPATIENT
Start: 2025-06-18

## (undated) DEVICE — BANDAGE,GAUZE,BULKEE II,4.5"X4.1YD,STRL: Brand: MEDLINE

## (undated) DEVICE — SOL IRRIG H2O 1000ML STRL

## (undated) DEVICE — PAD,ABDOMINAL,5"X9",STERILE,LF,1/PK: Brand: MEDLINE INDUSTRIES, INC.

## (undated) DEVICE — DRSNG SURESITE123 6X8IN

## (undated) DEVICE — PK UNIV SPINE 50

## (undated) DEVICE — DRSNG WND BORDR/ADHS NONADHR/GZ LF 4X4IN STRL

## (undated) DEVICE — GLV SURG TRIUMPH LT PF LTX 7 STRL

## (undated) DEVICE — CUFF SCD HEMOFORCE SEQ CALF STD MD

## (undated) DEVICE — DRSNG SURESITE WNDW 2.38X2.75

## (undated) DEVICE — ADHS LIQ MASTISOL 2/3ML

## (undated) DEVICE — PRESSURE MONITORING ACCESSORY: Brand: TRUWAVE

## (undated) DEVICE — SUT VIC 2/0 CT1 36IN

## (undated) DEVICE — Device

## (undated) DEVICE — 3.0MM PRECISION ROUND

## (undated) DEVICE — DRSNG SURESITE123 8X12IN

## (undated) DEVICE — 3M™ STERI-STRIP™ REINFORCED ADHESIVE SKIN CLOSURES, R1547, 1/2 IN X 4 IN (12 MM X 100 MM), 6 STRIPS/ENVELOPE: Brand: 3M™ STERI-STRIP™

## (undated) DEVICE — PK PROC TURNOVER

## (undated) DEVICE — DECANTER: Brand: UNBRANDED

## (undated) DEVICE — GLV SURG TRIUMPH GREEN W/ALOE PF LTX 7.5 STRL

## (undated) DEVICE — GLV SURG TRIUMPH GREEN W/ALOE PF LTX 8.5 STRL

## (undated) DEVICE — PRESSURE TUBING: Brand: TRUWAVE

## (undated) DEVICE — ORG INST STRIP/TS ADHS 2X10IN YEL STRL

## (undated) DEVICE — DRN WND EVAC BULB 100CC

## (undated) DEVICE — SUT VIC 1 CTX 36IN J977H

## (undated) DEVICE — SPONGE,LAP,12"X12",XR,ST,5/PK,40PK/CS: Brand: MEDLINE

## (undated) DEVICE — SUT SILK 2/0 FS BLK 18IN 685G

## (undated) DEVICE — CATH IV INSYTE AUTOGARD 14G 1 1/2IN ORNG

## (undated) DEVICE — PENCL E/S ULTRAVAC TELESCP NOSE HOLSTR 10FT

## (undated) DEVICE — GAUZE,SPONGE,4"X4",16PLY,XRAY,STRL,LF: Brand: MEDLINE

## (undated) DEVICE — SOL IRRIG NACL 9PCT 1000ML BTL

## (undated) DEVICE — SYR LUERLOK 30CC

## (undated) DEVICE — GLV SURG TRIUMPH ORTHO W/ALOE PF LTX 8 STRL

## (undated) DEVICE — VIOLET BRAIDED (POLYGLACTIN 910), SYNTHETIC ABSORBABLE SUTURE: Brand: COATED VICRYL

## (undated) DEVICE — DRN WND RND END PERF W/TROC 10IN HL 3/16IN